# Patient Record
Sex: MALE | Race: WHITE | Employment: OTHER | URBAN - METROPOLITAN AREA
[De-identification: names, ages, dates, MRNs, and addresses within clinical notes are randomized per-mention and may not be internally consistent; named-entity substitution may affect disease eponyms.]

---

## 2017-03-20 ENCOUNTER — OFFICE VISIT (OUTPATIENT)
Dept: FAMILY MEDICINE CLINIC | Age: 72
End: 2017-03-20
Payer: MEDICARE

## 2017-03-20 ENCOUNTER — TELEPHONE (OUTPATIENT)
Dept: FAMILY MEDICINE CLINIC | Age: 72
End: 2017-03-20

## 2017-03-20 VITALS
DIASTOLIC BLOOD PRESSURE: 72 MMHG | OXYGEN SATURATION: 98 % | BODY MASS INDEX: 27.26 KG/M2 | SYSTOLIC BLOOD PRESSURE: 130 MMHG | HEART RATE: 58 BPM | WEIGHT: 190 LBS

## 2017-03-20 DIAGNOSIS — I10 ESSENTIAL HYPERTENSION, BENIGN: ICD-10-CM

## 2017-03-20 DIAGNOSIS — Z79.4 TYPE 2 DIABETES MELLITUS WITH DIABETIC NEUROPATHY, WITH LONG-TERM CURRENT USE OF INSULIN (HCC): Primary | ICD-10-CM

## 2017-03-20 DIAGNOSIS — E11.40 TYPE 2 DIABETES MELLITUS WITH DIABETIC NEUROPATHY, WITH LONG-TERM CURRENT USE OF INSULIN (HCC): Primary | ICD-10-CM

## 2017-03-20 DIAGNOSIS — E78.00 PURE HYPERCHOLESTEROLEMIA: ICD-10-CM

## 2017-03-20 LAB
CREATININE URINE POCT: ABNORMAL
HBA1C MFR BLD: 8.2 %
MICROALBUMIN/CREAT 24H UR: ABNORMAL MG/G{CREAT}
MICROALBUMIN/CREAT UR-RTO: ABNORMAL

## 2017-03-20 PROCEDURE — 82044 UR ALBUMIN SEMIQUANTITATIVE: CPT | Performed by: FAMILY MEDICINE

## 2017-03-20 PROCEDURE — 3045F PR MOST RECENT HEMOGLOBIN A1C LEVEL 7.0-9.0%: CPT | Performed by: FAMILY MEDICINE

## 2017-03-20 PROCEDURE — 1036F TOBACCO NON-USER: CPT | Performed by: FAMILY MEDICINE

## 2017-03-20 PROCEDURE — 99214 OFFICE O/P EST MOD 30 MIN: CPT | Performed by: FAMILY MEDICINE

## 2017-03-20 PROCEDURE — G8484 FLU IMMUNIZE NO ADMIN: HCPCS | Performed by: FAMILY MEDICINE

## 2017-03-20 PROCEDURE — G8420 CALC BMI NORM PARAMETERS: HCPCS | Performed by: FAMILY MEDICINE

## 2017-03-20 PROCEDURE — G8427 DOCREV CUR MEDS BY ELIG CLIN: HCPCS | Performed by: FAMILY MEDICINE

## 2017-03-20 PROCEDURE — 3017F COLORECTAL CA SCREEN DOC REV: CPT | Performed by: FAMILY MEDICINE

## 2017-03-20 PROCEDURE — 83036 HEMOGLOBIN GLYCOSYLATED A1C: CPT | Performed by: FAMILY MEDICINE

## 2017-03-20 PROCEDURE — 4040F PNEUMOC VAC/ADMIN/RCVD: CPT | Performed by: FAMILY MEDICINE

## 2017-03-20 PROCEDURE — 1123F ACP DISCUSS/DSCN MKR DOCD: CPT | Performed by: FAMILY MEDICINE

## 2017-03-20 RX ORDER — METOPROLOL SUCCINATE 50 MG/1
50 TABLET, EXTENDED RELEASE ORAL DAILY
Qty: 90 TABLET | Refills: 1 | Status: SHIPPED | OUTPATIENT
Start: 2017-03-20 | End: 2017-07-17 | Stop reason: SDUPTHER

## 2017-03-20 RX ORDER — FLUTICASONE PROPIONATE 50 MCG
1 SPRAY, SUSPENSION (ML) NASAL 2 TIMES DAILY
COMMUNITY
End: 2018-07-16 | Stop reason: SINTOL

## 2017-03-20 RX ORDER — ATORVASTATIN CALCIUM 10 MG/1
10 TABLET, FILM COATED ORAL DAILY
Qty: 90 TABLET | Refills: 1 | Status: SHIPPED | OUTPATIENT
Start: 2017-03-20 | End: 2017-09-20 | Stop reason: SDUPTHER

## 2017-03-20 RX ORDER — MONTELUKAST SODIUM 10 MG/1
10 TABLET ORAL NIGHTLY
Qty: 30 TABLET | Refills: 2 | Status: CANCELLED | OUTPATIENT
Start: 2017-03-20

## 2017-03-20 RX ORDER — LISINOPRIL 5 MG/1
5 TABLET ORAL DAILY
Qty: 90 TABLET | Refills: 1 | Status: SHIPPED | OUTPATIENT
Start: 2017-03-20 | End: 2017-09-20 | Stop reason: SDUPTHER

## 2017-03-20 RX ORDER — LANCETS 30 GAUGE
EACH MISCELLANEOUS
Qty: 100 EACH | Refills: 3 | Status: CANCELLED | OUTPATIENT
Start: 2017-03-20

## 2017-03-20 RX ORDER — GLUCOSAMINE HCL/CHONDROITIN SU 500-400 MG
CAPSULE ORAL
Qty: 100 STRIP | Refills: 3 | Status: SHIPPED | OUTPATIENT
Start: 2017-03-20 | End: 2017-08-28 | Stop reason: SDUPTHER

## 2017-03-20 ASSESSMENT — ENCOUNTER SYMPTOMS
ABDOMINAL PAIN: 0
BLURRED VISION: 0
COUGH: 0
SHORTNESS OF BREATH: 0
EYE REDNESS: 0
FACIAL SWELLING: 0
EYE DISCHARGE: 0
TROUBLE SWALLOWING: 0
VOMITING: 0
VISUAL CHANGE: 0
DIARRHEA: 0
CONSTIPATION: 0
BLOOD IN STOOL: 0
NAUSEA: 0

## 2017-04-19 ENCOUNTER — OFFICE VISIT (OUTPATIENT)
Dept: FAMILY MEDICINE CLINIC | Age: 72
End: 2017-04-19
Payer: MEDICARE

## 2017-04-19 VITALS
SYSTOLIC BLOOD PRESSURE: 130 MMHG | TEMPERATURE: 97.6 F | HEART RATE: 68 BPM | BODY MASS INDEX: 26.18 KG/M2 | WEIGHT: 187 LBS | DIASTOLIC BLOOD PRESSURE: 70 MMHG | HEIGHT: 71 IN | RESPIRATION RATE: 18 BRPM

## 2017-04-19 DIAGNOSIS — J01.01 ACUTE RECURRENT MAXILLARY SINUSITIS: Primary | ICD-10-CM

## 2017-04-19 PROCEDURE — 1123F ACP DISCUSS/DSCN MKR DOCD: CPT | Performed by: NURSE PRACTITIONER

## 2017-04-19 PROCEDURE — G8427 DOCREV CUR MEDS BY ELIG CLIN: HCPCS | Performed by: NURSE PRACTITIONER

## 2017-04-19 PROCEDURE — 4040F PNEUMOC VAC/ADMIN/RCVD: CPT | Performed by: NURSE PRACTITIONER

## 2017-04-19 PROCEDURE — 3017F COLORECTAL CA SCREEN DOC REV: CPT | Performed by: NURSE PRACTITIONER

## 2017-04-19 PROCEDURE — G8420 CALC BMI NORM PARAMETERS: HCPCS | Performed by: NURSE PRACTITIONER

## 2017-04-19 PROCEDURE — 99213 OFFICE O/P EST LOW 20 MIN: CPT | Performed by: NURSE PRACTITIONER

## 2017-04-19 PROCEDURE — 1036F TOBACCO NON-USER: CPT | Performed by: NURSE PRACTITIONER

## 2017-04-19 RX ORDER — AMOXICILLIN AND CLAVULANATE POTASSIUM 875; 125 MG/1; MG/1
1 TABLET, FILM COATED ORAL EVERY 12 HOURS
Qty: 14 TABLET | Refills: 0 | Status: SHIPPED | OUTPATIENT
Start: 2017-04-19 | End: 2017-04-26

## 2017-04-19 ASSESSMENT — ENCOUNTER SYMPTOMS
WHEEZING: 0
RHINORRHEA: 1
VOICE CHANGE: 1
SINUS PRESSURE: 1
SORE THROAT: 1
ABDOMINAL DISTENTION: 0
SHORTNESS OF BREATH: 0
COUGH: 1

## 2017-07-17 DIAGNOSIS — I10 ESSENTIAL HYPERTENSION, BENIGN: ICD-10-CM

## 2017-07-17 RX ORDER — METOPROLOL SUCCINATE 50 MG/1
50 TABLET, EXTENDED RELEASE ORAL DAILY
Qty: 90 TABLET | Refills: 1 | Status: SHIPPED | OUTPATIENT
Start: 2017-07-17 | End: 2017-09-20 | Stop reason: SDUPTHER

## 2017-07-20 DIAGNOSIS — Z79.4 TYPE 2 DIABETES MELLITUS WITH DIABETIC NEUROPATHY, WITH LONG-TERM CURRENT USE OF INSULIN (HCC): ICD-10-CM

## 2017-07-20 DIAGNOSIS — E11.40 TYPE 2 DIABETES MELLITUS WITH DIABETIC NEUROPATHY, WITH LONG-TERM CURRENT USE OF INSULIN (HCC): ICD-10-CM

## 2017-08-03 LAB
AVERAGE GLUCOSE: NORMAL
CHOLESTEROL, TOTAL: 123 MG/DL
CHOLESTEROL/HDL RATIO: 3.7
HBA1C MFR BLD: 8.4 %
HDLC SERPL-MCNC: 33 MG/DL (ref 35–70)
LDL CHOLESTEROL CALCULATED: 72 MG/DL (ref 0–160)
TRIGL SERPL-MCNC: 89 MG/DL
VLDLC SERPL CALC-MCNC: ABNORMAL MG/DL

## 2017-08-24 ENCOUNTER — HOSPITAL ENCOUNTER (OUTPATIENT)
Dept: DIABETES SERVICES | Age: 72
Discharge: HOME | End: 2017-08-24
Payer: MEDICARE

## 2017-08-24 VITALS
SYSTOLIC BLOOD PRESSURE: 138 MMHG | BODY MASS INDEX: 26.1 KG/M2 | WEIGHT: 186.4 LBS | DIASTOLIC BLOOD PRESSURE: 71 MMHG | HEIGHT: 71 IN

## 2017-08-24 PROCEDURE — G0108 DIAB MANAGE TRN  PER INDIV: HCPCS

## 2017-08-24 RX ORDER — EPINEPHRINE 0.3 MG/.3ML
0.3 INJECTION SUBCUTANEOUS PRN
COMMUNITY
End: 2017-12-22 | Stop reason: SDUPTHER

## 2017-08-24 RX ORDER — BACLOFEN 20 MG
1 TABLET ORAL DAILY
COMMUNITY
End: 2022-08-02

## 2017-08-24 RX ORDER — ASPIRIN 81 MG/1
81 TABLET ORAL DAILY
COMMUNITY

## 2017-08-25 ENCOUNTER — TELEPHONE (OUTPATIENT)
Dept: FAMILY MEDICINE CLINIC | Age: 72
End: 2017-08-25

## 2017-08-28 ENCOUNTER — TELEPHONE (OUTPATIENT)
Dept: DIABETES SERVICES | Age: 72
End: 2017-08-28

## 2017-08-29 RX ORDER — GLUCOSAMINE HCL/CHONDROITIN SU 500-400 MG
CAPSULE ORAL
Qty: 100 STRIP | Refills: 3 | Status: SHIPPED | OUTPATIENT
Start: 2017-08-29 | End: 2017-12-04 | Stop reason: SDUPTHER

## 2017-09-18 ENCOUNTER — HOSPITAL ENCOUNTER (OUTPATIENT)
Dept: NUTRITION | Age: 72
Discharge: HOME | End: 2017-09-18
Payer: MEDICARE

## 2017-09-18 PROCEDURE — 97802 MEDICAL NUTRITION INDIV IN: CPT

## 2017-09-20 ENCOUNTER — OFFICE VISIT (OUTPATIENT)
Dept: FAMILY MEDICINE CLINIC | Age: 72
End: 2017-09-20
Payer: MEDICARE

## 2017-09-20 VITALS — DIASTOLIC BLOOD PRESSURE: 54 MMHG | WEIGHT: 183 LBS | SYSTOLIC BLOOD PRESSURE: 110 MMHG | BODY MASS INDEX: 25.89 KG/M2

## 2017-09-20 DIAGNOSIS — I10 ESSENTIAL HYPERTENSION, BENIGN: ICD-10-CM

## 2017-09-20 DIAGNOSIS — Z79.4 TYPE 2 DIABETES MELLITUS WITHOUT COMPLICATION, WITH LONG-TERM CURRENT USE OF INSULIN (HCC): ICD-10-CM

## 2017-09-20 DIAGNOSIS — E78.00 PURE HYPERCHOLESTEROLEMIA: ICD-10-CM

## 2017-09-20 DIAGNOSIS — E11.9 TYPE 2 DIABETES MELLITUS WITHOUT COMPLICATION, WITH LONG-TERM CURRENT USE OF INSULIN (HCC): ICD-10-CM

## 2017-09-20 PROCEDURE — 1123F ACP DISCUSS/DSCN MKR DOCD: CPT | Performed by: FAMILY MEDICINE

## 2017-09-20 PROCEDURE — G8427 DOCREV CUR MEDS BY ELIG CLIN: HCPCS | Performed by: FAMILY MEDICINE

## 2017-09-20 PROCEDURE — 99214 OFFICE O/P EST MOD 30 MIN: CPT | Performed by: FAMILY MEDICINE

## 2017-09-20 PROCEDURE — 4040F PNEUMOC VAC/ADMIN/RCVD: CPT | Performed by: FAMILY MEDICINE

## 2017-09-20 PROCEDURE — G8417 CALC BMI ABV UP PARAM F/U: HCPCS | Performed by: FAMILY MEDICINE

## 2017-09-20 PROCEDURE — 3046F HEMOGLOBIN A1C LEVEL >9.0%: CPT | Performed by: FAMILY MEDICINE

## 2017-09-20 PROCEDURE — 1036F TOBACCO NON-USER: CPT | Performed by: FAMILY MEDICINE

## 2017-09-20 PROCEDURE — 3017F COLORECTAL CA SCREEN DOC REV: CPT | Performed by: FAMILY MEDICINE

## 2017-09-20 RX ORDER — METOPROLOL SUCCINATE 50 MG/1
50 TABLET, EXTENDED RELEASE ORAL DAILY
Qty: 90 TABLET | Refills: 1 | Status: SHIPPED | OUTPATIENT
Start: 2017-09-20 | End: 2018-01-12 | Stop reason: DRUGHIGH

## 2017-09-20 RX ORDER — ATORVASTATIN CALCIUM 10 MG/1
10 TABLET, FILM COATED ORAL DAILY
Qty: 90 TABLET | Refills: 1 | Status: SHIPPED | OUTPATIENT
Start: 2017-09-20 | End: 2018-03-05 | Stop reason: SDUPTHER

## 2017-09-20 RX ORDER — LISINOPRIL 5 MG/1
5 TABLET ORAL DAILY
Qty: 90 TABLET | Refills: 1 | Status: SHIPPED | OUTPATIENT
Start: 2017-09-20 | End: 2018-03-05 | Stop reason: SDUPTHER

## 2017-09-20 RX ORDER — LANCETS 30 GAUGE
EACH MISCELLANEOUS
Qty: 100 EACH | Refills: 3 | Status: SHIPPED | OUTPATIENT
Start: 2017-09-20

## 2017-09-20 ASSESSMENT — ENCOUNTER SYMPTOMS
EYE DISCHARGE: 0
DIARRHEA: 0
COUGH: 0
FACIAL SWELLING: 0
BLOOD IN STOOL: 0
VOMITING: 0
EYE REDNESS: 0
SHORTNESS OF BREATH: 0
ABDOMINAL PAIN: 0
CONSTIPATION: 0
TROUBLE SWALLOWING: 0
NAUSEA: 1

## 2017-11-16 LAB
AVERAGE GLUCOSE: NORMAL
HBA1C MFR BLD: 7.8 %

## 2017-12-04 ENCOUNTER — OFFICE VISIT (OUTPATIENT)
Dept: FAMILY MEDICINE CLINIC | Age: 72
End: 2017-12-04
Payer: MEDICARE

## 2017-12-04 VITALS — DIASTOLIC BLOOD PRESSURE: 62 MMHG | BODY MASS INDEX: 26.88 KG/M2 | SYSTOLIC BLOOD PRESSURE: 128 MMHG | WEIGHT: 190 LBS

## 2017-12-04 DIAGNOSIS — R07.89 OTHER CHEST PAIN: ICD-10-CM

## 2017-12-04 DIAGNOSIS — M25.512 CHRONIC PAIN OF BOTH SHOULDERS: ICD-10-CM

## 2017-12-04 DIAGNOSIS — G89.29 CHRONIC PAIN OF BOTH SHOULDERS: ICD-10-CM

## 2017-12-04 DIAGNOSIS — M25.511 CHRONIC PAIN OF BOTH SHOULDERS: ICD-10-CM

## 2017-12-04 DIAGNOSIS — Z23 NEED FOR INFLUENZA VACCINATION: ICD-10-CM

## 2017-12-04 DIAGNOSIS — Z13.6 SCREENING FOR AAA (AORTIC ABDOMINAL ANEURYSM): ICD-10-CM

## 2017-12-04 DIAGNOSIS — E11.40 TYPE 2 DIABETES MELLITUS WITH DIABETIC NEUROPATHY, WITH LONG-TERM CURRENT USE OF INSULIN (HCC): Primary | ICD-10-CM

## 2017-12-04 DIAGNOSIS — Z79.4 TYPE 2 DIABETES MELLITUS WITH DIABETIC NEUROPATHY, WITH LONG-TERM CURRENT USE OF INSULIN (HCC): Primary | ICD-10-CM

## 2017-12-04 PROCEDURE — 3017F COLORECTAL CA SCREEN DOC REV: CPT | Performed by: FAMILY MEDICINE

## 2017-12-04 PROCEDURE — 3045F PR MOST RECENT HEMOGLOBIN A1C LEVEL 7.0-9.0%: CPT | Performed by: FAMILY MEDICINE

## 2017-12-04 PROCEDURE — 99214 OFFICE O/P EST MOD 30 MIN: CPT | Performed by: FAMILY MEDICINE

## 2017-12-04 PROCEDURE — G8427 DOCREV CUR MEDS BY ELIG CLIN: HCPCS | Performed by: FAMILY MEDICINE

## 2017-12-04 PROCEDURE — 1123F ACP DISCUSS/DSCN MKR DOCD: CPT | Performed by: FAMILY MEDICINE

## 2017-12-04 PROCEDURE — G0008 ADMIN INFLUENZA VIRUS VAC: HCPCS | Performed by: FAMILY MEDICINE

## 2017-12-04 PROCEDURE — G8484 FLU IMMUNIZE NO ADMIN: HCPCS | Performed by: FAMILY MEDICINE

## 2017-12-04 PROCEDURE — G8417 CALC BMI ABV UP PARAM F/U: HCPCS | Performed by: FAMILY MEDICINE

## 2017-12-04 PROCEDURE — 1036F TOBACCO NON-USER: CPT | Performed by: FAMILY MEDICINE

## 2017-12-04 PROCEDURE — 4040F PNEUMOC VAC/ADMIN/RCVD: CPT | Performed by: FAMILY MEDICINE

## 2017-12-04 PROCEDURE — 90686 IIV4 VACC NO PRSV 0.5 ML IM: CPT | Performed by: FAMILY MEDICINE

## 2017-12-04 RX ORDER — GLUCOSAMINE HCL/CHONDROITIN SU 500-400 MG
CAPSULE ORAL
Qty: 100 STRIP | Refills: 3 | Status: SHIPPED | OUTPATIENT
Start: 2017-12-04

## 2017-12-04 RX ORDER — EPINEPHRINE 0.3 MG/.3ML
0.3 INJECTION SUBCUTANEOUS
COMMUNITY
Start: 2017-08-05

## 2017-12-04 ASSESSMENT — ENCOUNTER SYMPTOMS
BLOOD IN STOOL: 0
DIARRHEA: 0
VOMITING: 0
EYE REDNESS: 0
SHORTNESS OF BREATH: 0
ABDOMINAL PAIN: 0
TROUBLE SWALLOWING: 0
VISUAL CHANGE: 0
CONSTIPATION: 0
BLURRED VISION: 0
EYE DISCHARGE: 0
COUGH: 0
NAUSEA: 0

## 2017-12-04 ASSESSMENT — PATIENT HEALTH QUESTIONNAIRE - PHQ9
SUM OF ALL RESPONSES TO PHQ QUESTIONS 1-9: 0
1. LITTLE INTEREST OR PLEASURE IN DOING THINGS: 0
SUM OF ALL RESPONSES TO PHQ9 QUESTIONS 1 & 2: 0
2. FEELING DOWN, DEPRESSED OR HOPELESS: 0

## 2017-12-04 NOTE — PROGRESS NOTES
HPI Notes    Name: Marcos Alberto  : 1945         Chief Complaint:     Chief Complaint   Patient presents with    Diabetes     Pt presents today for 3 month follow up, Plan from  [address] D/C Victoza due to side effects continue Glucophage, D/C Tresiba due to cost. Pt will try insulin N  55 U in AM and 40 U  PM  Pt referred to DM clinic     Chest Pain       History of Present Illness:      Marcos Alberto is a 81 y.o.  male who presents with Diabetes (Pt presents today for 3 month follow up, Plan from  [address] D/C Victoza due to side effects continue Glucophage, D/C Tresiba due to cost. Pt will try insulin N  55 U in AM and 40 U  PM  Pt referred to DM clinic ) and Chest Pain      Diabetes   He presents for his follow-up diabetic visit. He has type 2 diabetes mellitus. His disease course has been improving. There are no hypoglycemic associated symptoms. Pertinent negatives for hypoglycemia include no dizziness, headaches or tremors. Associated symptoms include chest pain. Pertinent negatives for diabetes include no blurred vision, no fatigue, no foot paresthesias, no foot ulcerations, no polydipsia, no polyuria and no visual change. There are no hypoglycemic complications. Symptoms are stable. There are no diabetic complications. Risk factors for coronary artery disease include diabetes mellitus and male sex. Current diabetic treatment includes insulin injections. He is compliant with treatment most of the time. His weight is stable. He is following a diabetic diet. His home blood glucose trend is decreasing steadily. An ACE inhibitor/angiotensin II receptor blocker is being taken. He sees a podiatrist (Dr Norah Garay). Eye exam is current. Chest Pain    This is a new problem. The current episode started 1 to 4 weeks ago. The onset quality is gradual. The problem occurs intermittently. The problem has been unchanged. The pain is present in the substernal region.  The pain radiates to the left shoulder and REMOVAL      LUNG BIOPSY  2007    PROSTATE BIOPSY      TOOTH EXTRACTION  4/2014    VASECTOMY          Medications:       Prior to Admission medications    Medication Sig Start Date End Date Taking? Authorizing Provider   EPINEPHrine (EPIPEN) 0.3 MG/0.3ML SOAJ injection Inject 0.3 mg into the muscle 8/5/17  Yes Historical Provider, MD   metoprolol succinate (TOPROL XL) 50 MG extended release tablet Take 1 tablet by mouth daily 9/20/17  Yes Ryan Rodriguez MD   atorvastatin (LIPITOR) 10 MG tablet Take 1 tablet by mouth daily 9/20/17  Yes Ryan Rodriguez MD   lisinopril (PRINIVIL;ZESTRIL) 5 MG tablet Take 1 tablet by mouth daily 9/20/17  Yes Ryan Rodriguez MD   insulin NPH (HUMULIN N) 100 UNIT/ML injection vial Inject  SC 30 units in AM and 30 units in PM  Patient taking differently: Inject  SC 55 units in AM and 40 units in PM 9/20/17  Yes Ryan Rodriguez MD   Calcium Carb-Cholecalciferol 600-500 MG-UNIT CAPS Take 1 capsule by mouth 2 times daily   Yes Historical Provider, MD   Cholecalciferol (VITAMIN D3) 5000 units CAPS Take 1 capsule by mouth daily   Yes Historical Provider, MD   Magnesium Oxide 500 MG TABS Take 1 tablet by mouth daily   Yes Historical Provider, MD   aspirin 81 MG EC tablet Take 81 mg by mouth daily   Yes Historical Provider, MD   mupirocin (BACTROBAN) 2 % ointment by Each Nare route nightly   Yes Juana Dominguez   metFORMIN (GLUCOPHAGE) 1000 MG tablet Take 1 tablet by mouth 2 times daily (with meals) 7/20/17  Yes Ryan Rodriguez MD   fluticasone (FLONASE) 50 MCG/ACT nasal spray 1 spray by Nasal route 2 times daily    Yes Historical Provider, MD   NONFORMULARY Indications: Neuravite takes 1 daily B1, B6, B12, alpha lipoic acid in each capsule. Yes Historical Provider, MD   leuprolide (LUPRON) 22.5 MG injection Inject 22.5 mg into the muscle.  Every three months   Yes Historical Provider, MD   Naproxen Sodium (ALEVE) 220 MG CAPS Take 2 tablets by mouth every evening    Yes Historical urinating, dysuria and hematuria. Musculoskeletal: Positive for arthralgias and stiffness. Negative for joint swelling and neck pain. Bilateral shoulder pain. Skin: Negative for rash. Neurological: Negative for dizziness, tingling, tremors, light-headedness, numbness and headaches. Physical Exam:     Physical Exam   Constitutional: He appears well-developed and well-nourished. HENT:   Head: Normocephalic and atraumatic. Eyes: Conjunctivae are normal. Pupils are equal, round, and reactive to light. Right eye exhibits no discharge. Left eye exhibits no discharge. Neck: Neck supple. No thyromegaly present. Cardiovascular: Normal rate, regular rhythm and normal heart sounds. No murmur heard. Pulmonary/Chest: Effort normal and breath sounds normal. No respiratory distress. He has no wheezes. Abdominal: Soft. Bowel sounds are normal. He exhibits no distension. There is no tenderness. Musculoskeletal: Normal range of motion. He exhibits tenderness. He exhibits no edema or deformity. Right shoulder: He exhibits tenderness. He exhibits normal range of motion, no bony tenderness, no swelling, no effusion, no deformity and normal strength. Left shoulder: He exhibits tenderness. He exhibits normal range of motion, no bony tenderness, no swelling, no crepitus, no deformity, no pain and normal strength. Tenderness to palpate over the [facility] joint   Lymphadenopathy:     He has no cervical adenopathy. Skin: Skin is warm and dry. No rash noted. No erythema. Psychiatric: He has a normal mood and affect. His behavior is normal.   Vitals reviewed.       Vitals:  /62   Wt 190 lb (86.2 kg)   BMI 26.88 kg/m²       Data:     No results found for: NA, K, CL, CO2, BUN, CREATININE, GLUCOSE, PROT, LABALBU, BILITOT, ALKPHOS, AST, ALT  No results found for: WBC, RBC, HGB, HCT, MCV, MCH, MCHC, RDW, PLT, MPV  No results found for: TSH  Lab Results   Component Value Date    CHOL 123

## 2017-12-06 ENCOUNTER — TELEPHONE (OUTPATIENT)
Dept: FAMILY MEDICINE CLINIC | Age: 72
End: 2017-12-06

## 2017-12-06 NOTE — TELEPHONE ENCOUNTER
Per Dr Hawley Batch, it's ok to have PET scan and stress test done on dates they are ordered.
hypertension, benign     Incontinence of urine     Prostate cancer ([facility] [address]     Hyperlipidemia     History of colonic polyps     Osteoporosis     Type 2 diabetes mellitus with diabetic neuropathy, with long-term current use of insulin (HCC)     Seasonal allergies

## 2017-12-13 ENCOUNTER — TELEPHONE (OUTPATIENT)
Dept: CARDIOLOGY CLINIC | Age: 72
End: 2017-12-13

## 2017-12-13 DIAGNOSIS — I10 ESSENTIAL HYPERTENSION, BENIGN: Primary | ICD-10-CM

## 2017-12-13 DIAGNOSIS — E11.40 TYPE 2 DIABETES MELLITUS WITH DIABETIC NEUROPATHY, WITH LONG-TERM CURRENT USE OF INSULIN (HCC): ICD-10-CM

## 2017-12-13 DIAGNOSIS — Z79.4 TYPE 2 DIABETES MELLITUS WITH DIABETIC NEUROPATHY, WITH LONG-TERM CURRENT USE OF INSULIN (HCC): ICD-10-CM

## 2017-12-13 DIAGNOSIS — E78.00 PURE HYPERCHOLESTEROLEMIA: ICD-10-CM

## 2017-12-13 DIAGNOSIS — R07.89 OTHER CHEST PAIN: ICD-10-CM

## 2017-12-13 DIAGNOSIS — E55.9 VITAMIN D DEFICIENCY: ICD-10-CM

## 2017-12-14 ENCOUNTER — HOSPITAL ENCOUNTER (OUTPATIENT)
Dept: NUCLEAR MEDICINE | Age: 72
Discharge: HOME | End: 2017-12-14
Payer: MEDICARE

## 2017-12-14 ENCOUNTER — HOSPITAL ENCOUNTER (OUTPATIENT)
Dept: ULTRASOUND IMAGING | Age: 72
Discharge: HOME | End: 2017-12-14
Payer: MEDICARE

## 2017-12-14 ENCOUNTER — HOSPITAL ENCOUNTER (OUTPATIENT)
Dept: NON INVASIVE DIAGNOSTICS | Age: 72
Discharge: HOME | End: 2017-12-14
Payer: MEDICARE

## 2017-12-14 ENCOUNTER — HOSPITAL ENCOUNTER (OUTPATIENT)
Age: 72
Discharge: HOME | End: 2017-12-14
Payer: MEDICARE

## 2017-12-14 DIAGNOSIS — Z13.6 SCREENING FOR AAA (AORTIC ABDOMINAL ANEURYSM): ICD-10-CM

## 2017-12-14 DIAGNOSIS — R07.89 OTHER CHEST PAIN: ICD-10-CM

## 2017-12-14 LAB
CHOLESTEROL/HDL RATIO: 4.6
CHOLESTEROL: 157 MG/DL
ESTIMATED AVERAGE GLUCOSE: 177 MG/DL
HBA1C MFR BLD: 7.8 % (ref 4.8–5.9)
HDLC SERPL-MCNC: 34 MG/DL
LDL CHOLESTEROL: 85 MG/DL (ref 0–130)
MAGNESIUM: 2.1 MG/DL (ref 1.6–2.6)
PATIENT FASTING?: YES
TRIGL SERPL-MCNC: 189 MG/DL
TSH SERPL DL<=0.05 MIU/L-ACNC: 3.14 MIU/L (ref 0.3–5)
VITAMIN D 25-HYDROXY: 69 NG/ML (ref 30–100)
VLDLC SERPL CALC-MCNC: ABNORMAL MG/DL (ref 1–30)

## 2017-12-14 PROCEDURE — 3430000000 HC RX DIAGNOSTIC RADIOPHARMACEUTICAL: Performed by: FAMILY MEDICINE

## 2017-12-14 PROCEDURE — 84443 ASSAY THYROID STIM HORMONE: CPT

## 2017-12-14 PROCEDURE — 80061 LIPID PANEL: CPT

## 2017-12-14 PROCEDURE — 36415 COLL VENOUS BLD VENIPUNCTURE: CPT

## 2017-12-14 PROCEDURE — 93017 CV STRESS TEST TRACING ONLY: CPT

## 2017-12-14 PROCEDURE — 82306 VITAMIN D 25 HYDROXY: CPT

## 2017-12-14 PROCEDURE — A9500 TC99M SESTAMIBI: HCPCS | Performed by: FAMILY MEDICINE

## 2017-12-14 PROCEDURE — 76706 US ABDL AORTA SCREEN AAA: CPT

## 2017-12-14 PROCEDURE — 78452 HT MUSCLE IMAGE SPECT MULT: CPT

## 2017-12-14 PROCEDURE — 83735 ASSAY OF MAGNESIUM: CPT

## 2017-12-14 PROCEDURE — 83036 HEMOGLOBIN GLYCOSYLATED A1C: CPT

## 2017-12-14 RX ADMIN — TETRAKIS(2-METHOXYISOBUTYLISOCYANIDE)COPPER(I) TETRAFLUOROBORATE 11.4 MILLICURIE: 1 INJECTION, POWDER, LYOPHILIZED, FOR SOLUTION INTRAVENOUS at 07:25

## 2017-12-14 RX ADMIN — TETRAKIS(2-METHOXYISOBUTYLISOCYANIDE)COPPER(I) TETRAFLUOROBORATE 30.9 MILLICURIE: 1 INJECTION, POWDER, LYOPHILIZED, FOR SOLUTION INTRAVENOUS at 09:53

## 2017-12-18 ENCOUNTER — HOSPITAL ENCOUNTER (OUTPATIENT)
Dept: GENERAL RADIOLOGY | Age: 72
Discharge: HOME | End: 2017-12-18
Payer: MEDICARE

## 2017-12-18 ENCOUNTER — HOSPITAL ENCOUNTER (OUTPATIENT)
Age: 72
Discharge: HOME | End: 2017-12-18
Payer: MEDICARE

## 2017-12-18 DIAGNOSIS — E11.40 TYPE 2 DIABETES MELLITUS WITH DIABETIC NEUROPATHY, WITH LONG-TERM CURRENT USE OF INSULIN (HCC): ICD-10-CM

## 2017-12-18 DIAGNOSIS — I10 ESSENTIAL HYPERTENSION, BENIGN: ICD-10-CM

## 2017-12-18 DIAGNOSIS — Z79.4 TYPE 2 DIABETES MELLITUS WITH DIABETIC NEUROPATHY, WITH LONG-TERM CURRENT USE OF INSULIN (HCC): ICD-10-CM

## 2017-12-18 DIAGNOSIS — R07.89 OTHER CHEST PAIN: ICD-10-CM

## 2017-12-18 PROCEDURE — 71020 XR CHEST STANDARD TWO VW: CPT

## 2017-12-18 NOTE — PROCEDURES
[facility]                              [address] [address]                                CARDIAC STRESS TEST    PATIENT NAME: Millie Smith                       :        1945  MED REC NO:   [id number]                              ROOM:  ACCOUNT NO:   [de-identified]                           ADMIT DATE: 2017  PROVIDER:     Nurys Harrington    EXERCISE MYOVIEW STRESS TEST    DATE OF STUDY:  2017    INDICATION:  Chest pain. IMPRESSION:  1. He exercised 5 minutes on accelerated Sid protocol achieving 8.5  METs. Peak heart rate was 142, which is 94% maximum predicted heart rate. 2.  He had chest pain with his stress test, which subsided in recovery. 3.  He had mild ST depression in I, aVL, V5 and V6, although there is much  artifact and it was difficult to determine the amount of ST depression. 4.  There is overall abnormal cardiac stress test with reproduction of his  chest pain consistent with angina. 5.  Myoview to follow.         Pilar Ronquillo    D: 12/15/2017 14:53:05       T: 12/15/2017 16:00:14     [email or web address]  Job#: [id number]     Doc#: [id number]    CC:  Edil Vizcaino

## 2017-12-22 ENCOUNTER — OFFICE VISIT (OUTPATIENT)
Dept: CARDIOLOGY CLINIC | Age: 72
End: 2017-12-22
Payer: MEDICARE

## 2017-12-22 VITALS
OXYGEN SATURATION: 97 % | SYSTOLIC BLOOD PRESSURE: 140 MMHG | HEART RATE: 74 BPM | BODY MASS INDEX: 27.58 KG/M2 | DIASTOLIC BLOOD PRESSURE: 60 MMHG | WEIGHT: 195 LBS

## 2017-12-22 DIAGNOSIS — E11.40 TYPE 2 DIABETES MELLITUS WITH DIABETIC NEUROPATHY, WITH LONG-TERM CURRENT USE OF INSULIN (HCC): ICD-10-CM

## 2017-12-22 DIAGNOSIS — I20.9 ANGINA, CLASS III (HCC): ICD-10-CM

## 2017-12-22 DIAGNOSIS — E78.00 PURE HYPERCHOLESTEROLEMIA: ICD-10-CM

## 2017-12-22 DIAGNOSIS — R94.39 ABNORMAL STRESS ECG: Primary | ICD-10-CM

## 2017-12-22 DIAGNOSIS — Z79.4 TYPE 2 DIABETES MELLITUS WITH DIABETIC NEUROPATHY, WITH LONG-TERM CURRENT USE OF INSULIN (HCC): ICD-10-CM

## 2017-12-22 DIAGNOSIS — R07.9 CHEST PAIN, UNSPECIFIED TYPE: ICD-10-CM

## 2017-12-22 DIAGNOSIS — I10 ESSENTIAL HYPERTENSION, BENIGN: ICD-10-CM

## 2017-12-22 PROCEDURE — 1036F TOBACCO NON-USER: CPT | Performed by: INTERNAL MEDICINE

## 2017-12-22 PROCEDURE — 3045F PR MOST RECENT HEMOGLOBIN A1C LEVEL 7.0-9.0%: CPT | Performed by: INTERNAL MEDICINE

## 2017-12-22 PROCEDURE — 3017F COLORECTAL CA SCREEN DOC REV: CPT | Performed by: INTERNAL MEDICINE

## 2017-12-22 PROCEDURE — 99204 OFFICE O/P NEW MOD 45 MIN: CPT | Performed by: INTERNAL MEDICINE

## 2017-12-22 PROCEDURE — 4040F PNEUMOC VAC/ADMIN/RCVD: CPT | Performed by: INTERNAL MEDICINE

## 2017-12-22 PROCEDURE — G8598 ASA/ANTIPLAT THER USED: HCPCS | Performed by: INTERNAL MEDICINE

## 2017-12-22 PROCEDURE — G8417 CALC BMI ABV UP PARAM F/U: HCPCS | Performed by: INTERNAL MEDICINE

## 2017-12-22 PROCEDURE — G8484 FLU IMMUNIZE NO ADMIN: HCPCS | Performed by: INTERNAL MEDICINE

## 2017-12-22 PROCEDURE — 1123F ACP DISCUSS/DSCN MKR DOCD: CPT | Performed by: INTERNAL MEDICINE

## 2017-12-22 PROCEDURE — G8428 CUR MEDS NOT DOCUMENT: HCPCS | Performed by: INTERNAL MEDICINE

## 2017-12-22 RX ORDER — ISOSORBIDE MONONITRATE 30 MG/1
30 TABLET, EXTENDED RELEASE ORAL DAILY
COMMUNITY
End: 2017-12-22 | Stop reason: SDUPTHER

## 2017-12-22 RX ORDER — ISOSORBIDE MONONITRATE 30 MG/1
30 TABLET, EXTENDED RELEASE ORAL DAILY
Qty: 30 TABLET | Refills: 11 | Status: SHIPPED | OUTPATIENT
Start: 2017-12-22 | End: 2018-01-12 | Stop reason: DRUGHIGH

## 2017-12-22 NOTE — PROGRESS NOTES
Ov Dr. Fermín Pena for new pt   Pt did not bring med list with him  Today-states should be the same  Referred by Dr. Daina Cheung of abd/pelvis  was ordered-3 vessel disease   Dr. Roshan Alexander ordered [facility] [place]   Was having chest pain and stress was ordered  C/o back pain   When walking will have chest pain   And back pain since nov  Will have numbness right leg  No sob  Will have lightheadedness when getting  Up to fast  C/o edema feet  Will have chest pressure every day   C/o when stressed/anxiety will have   The chest pressure  After walking chest will loosen up  When stop chest pain will go away   Walks 5000 steps daily   Hx of prostate and lung ca-stable now   Bedside echo done    Retired-[employer]  Dad  with chf/pacer  Mom  had cabg  One brother healthy  One sister-stent->?   -Sowmya  3 children-all live close by   Former smoker quit 30 yrs ago     Will start IMDUR 30mg 1/2 tablet daily x one week   Then increase to full tablet daily   Will follow up in 2 weeks, if no improvement will  Proceed to schedule heart cath

## 2017-12-22 NOTE — PATIENT INSTRUCTIONS
Will start IMDUR 30mg 1/2 tablet daily x one week   Then increase to full tablet daily   Will follow up in 2 weeks, if no improvement will  Proceed to schedule heart cath

## 2017-12-22 NOTE — PROGRESS NOTES
retired from [address] [place] [address] [employer] in [facility]. He does not drink alcohol. He walks 5000 steps daily. He has curtailed his walking after developing his chest discomfort. REVIEW OF SYSTEMS:  Cardiac as above. Other 10 systems reviewed, including neurologic, psychiatric, pulmonary, cardiac, GI, , renal, and musculoskeletal.  He has had no weight loss or weight gain. No change in bowel habits, no blood in stools. No fevers, sweats or chills. His energy level has decreased in the last 1 to 2 months. He has developed more shortness of breath with activity as has his chest pain with activity as he had previously. He has had no progression of his cancer. He has no bleeding issues. PHYSICAL EXAMINATION:  VITAL SIGNS:  His blood pressure was 140/60, heart rate of 74 and regular, respiratory rate 18, O2 sat 95%, weight 195 pounds. GENERAL:  He is a pleasant 74-year-old gentleman. Denied pain. He was oriented to person, place and time. Answered questions appropriately. SKIN:  No unusual skin changes. HEENT:  The pupils are equally round and reactive to light and accommodation. Extraocular movements were intact. Mucous membranes were dry. NECK:  No JVD. Good carotid pulses. No carotid bruits. No lymphadenopathy or thyromegaly. CARDIOVASCULAR EXAM:  S1 and S2 were normal.  No S3 or S4. Soft systolic blowing type murmur. No diastolic murmur. PMI was normal.  No lifts, thrusts or pericardial friction rub. LUNGS:  Quite clear to auscultation and percussion. ABDOMEN:  Soft and nontender. Good bowel sounds. The aorta was not enlarged. No hepatomegaly, splenomegaly. EXTREMITIES:  Good femoral pulses. Good pedal pulses. No pedal edema. Skin was warm and dry. No calf tenderness. Nail beds pink. Good cap refill. PULSES:  Bilaterally symmetrical radial, brachial and carotid pulses. No carotid bruits. Good femoral and pedal pulses. NEUROLOGIC EXAM:  Within normal limits.   PSYCHIATRIC EXAM:  Within normal limits. LABORATORY DATA:  From 12/14, magnesium 2.1, cholesterol 157, HDL 34, LDL 85, triglycerides 189. Hemoglobin A1c was 7.8. TSH 3.14. Vitamin D 69. His white count was 5.35, hemoglobin 13.5 with a platelet count of 881,667 on 12/08. His calcium 9.7. AST was 23. BUN 14, creatinine 0.89, sodium 136, potassium 4.5. ALT was 20. Vitamin D was 36. EKG showed sinus rhythm with nonspecific ST changes. His stress test showed inferolateral, anterolateral wall apical ischemia with transient ischemic dilatation with EF of 59%, with high risk for significant coronary artery disease. Bedside echocardiogram showed borderline LV function, EF of 50% with normal right-sided chambers. IMPRESSION:  1. Chest pain consistent with angina, described as a heaviness with activity along with loss of energy and shortness of breath with activity, all new in the last 6 weeks, again consistent with class III angina. 2.  Abnormal Cardiolite stress test, at high risk for coronary artery disease with inferoapical lateral ischemia and transient ischemic dilatation. 3.  Hypertension. 4.  Hyperlipidemia. 5.  Insulin-dependent diabetes. 6.  Family history of coronary artery disease. 7.  Prostate carcinoma with prostatectomy in 06/2006. 8.  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 PLAN:  1. Placed him on full medical therapy with now adding Imdur 30 mg daily. 2.  Reevaluate in 2 weeks and if he is still having angina, we would then proceed with a cardiac catheterization. DISCUSSION: Mr. Heather Gaytan has multiple risk factors for CAD. He has developed discomfort that is consistent with angina within the last 4 to 6 weeks. His stress test was abnormal.    He is currently on beta-blockade, I will add Imdur 30 mg daily and reevaluate in 2 weeks.   If he is still having his discomfort in 2 weeks, then I would proceed directly with a cardiac

## 2017-12-22 NOTE — LETTER
Johny Raza M.D. [address] [place] [address] [address], [address]  [phone]          2017          Marion Resendez MD  [address] [place] [address] [address], [facility]    RE:  Radha Prince. Jaylen Garcia  : 1945    Dear Dr. Keli King:    CHIEF COMPLAINT:  Chest pain. HISTORY OF PRESENT ILLNESS:  Mr. Jaylen Garcia is a pleasant 72-year-old gentleman who has never had a cardiac problem in the past.  He has never had a myocardial infarction or cardiac catheterization. He does have multiple risk factors of CAD including diabetes, hypertension and a family history of coronary artery disease along with hyperlipidemia. He does have a history of prostate cancer, which was discovered in  where he was found to have prostate carcinoma. He underwent a robotic prostatectomy in 2006 when he had a Rumsey score of 4. In , he was found to have pulmonary nodules and he underwent a left upper lobe lobectomy, which proved positive for metastatic adenocarcinoma consistent with prostate primary. He has then been placed on medications and has had no progression of his disease. He is followed by Dr. Ankush Katz who last saw him on 2017. He is relatively active at home. He walks 5000 steps a day. He was doing well until approximately 4 to 5 weeks ago when he began developing chest heaviness and tightness when he would walk. He also noticed that he was more short of breath with activity. He describes heaviness with activity that is worse when he is in colder weather and subsides when he stops. He had a CT scan that showed calcification of his coronary arteries. He was asked to see me because of his chest pain and his atherosclerotic calcifications of his coronary arteries on a CT scan on 2017.     Because of his chest pain, we did a Cardiolite stress test.  He exercised 5 minutes and developed chest pain with the stress test that subsided in has 3 children, all live close by. He quit smoking 30 years ago. He retired from [employer] in [facility] [place] [facility]. He does not drink alcohol. He walks 5000 steps daily. He has curtailed his walking after developing his chest discomfort. REVIEW OF SYSTEMS:  Cardiac as above. Other 10 systems reviewed, including neurologic, psychiatric, pulmonary, cardiac, GI, , renal, and musculoskeletal.  He has had no weight loss or weight gain. No change in bowel habits, no blood in stools. No fevers, sweats or chills. His energy level has decreased in the last 1 to 2 months. He has developed more shortness of breath with activity as has his chest pain with activity as he had previously. He has had no progression of his cancer. He has no bleeding issues. PHYSICAL EXAMINATION:  VITAL SIGNS:  His blood pressure was 140/60, heart rate of 74 and regular, respiratory rate 18, O2 sat 95%, weight 195 pounds. GENERAL:  He is a pleasant 70-year-old gentleman. Denied pain. He was oriented to person, place and time. Answered questions appropriately. SKIN:  No unusual skin changes. HEENT:  The pupils are equally round and reactive to light and accommodation. Extraocular movements were intact. Mucous membranes were dry. NECK:  No JVD. Good carotid pulses. No carotid bruits. No lymphadenopathy or thyromegaly. CARDIOVASCULAR EXAM:  S1 and S2 were normal.  No S3 or S4. Soft systolic blowing type murmur. No diastolic murmur. PMI was normal.  No lifts, thrusts or pericardial friction rub. LUNGS:  Quite clear to auscultation and percussion. ABDOMEN:  Soft and nontender. Good bowel sounds. The aorta was not enlarged. No hepatomegaly, splenomegaly. EXTREMITIES:  Good femoral pulses. Good pedal pulses. No pedal edema. Skin was warm and dry. No calf tenderness. Nail beds pink. Good cap refill. PULSES:  Bilaterally symmetrical radial, brachial and carotid pulses. No carotid bruits. Good femoral and pedal pulses.

## 2018-01-12 ENCOUNTER — OFFICE VISIT (OUTPATIENT)
Dept: CARDIOLOGY CLINIC | Age: 73
End: 2018-01-12
Payer: MEDICARE

## 2018-01-12 VITALS
OXYGEN SATURATION: 97 % | BODY MASS INDEX: 27.3 KG/M2 | SYSTOLIC BLOOD PRESSURE: 130 MMHG | WEIGHT: 193 LBS | HEART RATE: 74 BPM | DIASTOLIC BLOOD PRESSURE: 60 MMHG

## 2018-01-12 DIAGNOSIS — Z79.4 TYPE 2 DIABETES MELLITUS WITH DIABETIC NEUROPATHY, WITH LONG-TERM CURRENT USE OF INSULIN (HCC): ICD-10-CM

## 2018-01-12 DIAGNOSIS — E78.00 PURE HYPERCHOLESTEROLEMIA: ICD-10-CM

## 2018-01-12 DIAGNOSIS — I20.9 ANGINA, CLASS III (HCC): ICD-10-CM

## 2018-01-12 DIAGNOSIS — R94.39 ABNORMAL STRESS ECG: ICD-10-CM

## 2018-01-12 DIAGNOSIS — I10 ESSENTIAL HYPERTENSION, BENIGN: ICD-10-CM

## 2018-01-12 DIAGNOSIS — E11.40 TYPE 2 DIABETES MELLITUS WITH DIABETIC NEUROPATHY, WITH LONG-TERM CURRENT USE OF INSULIN (HCC): ICD-10-CM

## 2018-01-12 DIAGNOSIS — N28.9 RENAL INSUFFICIENCY: Primary | ICD-10-CM

## 2018-01-12 PROCEDURE — 99213 OFFICE O/P EST LOW 20 MIN: CPT | Performed by: INTERNAL MEDICINE

## 2018-01-12 PROCEDURE — G8598 ASA/ANTIPLAT THER USED: HCPCS | Performed by: INTERNAL MEDICINE

## 2018-01-12 PROCEDURE — G8417 CALC BMI ABV UP PARAM F/U: HCPCS | Performed by: INTERNAL MEDICINE

## 2018-01-12 PROCEDURE — 4040F PNEUMOC VAC/ADMIN/RCVD: CPT | Performed by: INTERNAL MEDICINE

## 2018-01-12 PROCEDURE — 1123F ACP DISCUSS/DSCN MKR DOCD: CPT | Performed by: INTERNAL MEDICINE

## 2018-01-12 PROCEDURE — G8484 FLU IMMUNIZE NO ADMIN: HCPCS | Performed by: INTERNAL MEDICINE

## 2018-01-12 PROCEDURE — 3046F HEMOGLOBIN A1C LEVEL >9.0%: CPT | Performed by: INTERNAL MEDICINE

## 2018-01-12 PROCEDURE — 3017F COLORECTAL CA SCREEN DOC REV: CPT | Performed by: INTERNAL MEDICINE

## 2018-01-12 PROCEDURE — G8427 DOCREV CUR MEDS BY ELIG CLIN: HCPCS | Performed by: INTERNAL MEDICINE

## 2018-01-12 PROCEDURE — 1036F TOBACCO NON-USER: CPT | Performed by: INTERNAL MEDICINE

## 2018-01-12 RX ORDER — ISOSORBIDE MONONITRATE 30 MG/1
30 TABLET, EXTENDED RELEASE ORAL 2 TIMES DAILY
Status: SHIPPED | COMMUNITY
Start: 2018-01-12 | End: 2018-07-16 | Stop reason: ALTCHOICE

## 2018-01-12 RX ORDER — METOPROLOL SUCCINATE 50 MG/1
50 TABLET, EXTENDED RELEASE ORAL 2 TIMES DAILY
Status: SHIPPED | COMMUNITY
Start: 2018-01-12 | End: 2018-03-05 | Stop reason: SDUPTHER

## 2018-01-12 NOTE — PROGRESS NOTES
Ov Dr. Mookie Cantrell for 3 wk follow up  Still having chest pain when walking  No changes since last visit  Last night worse episode  Was taking down xmas decoration   Got home chest tightened up shoulder pain  Down to elbows   Didn't get sweaty   Did get nausea   Different pain than walking   Got chest discomfort when   Helping gdtg get out of ditch   Pain went away when resting     Will proceed with cath on Jan 17th  Will increase IMDUR to twice daily   Will increase METOPROLOL  To twice daily
nontender. Good bowel sounds. EXTREMITIES:  Good femoral pulses. Good pedal pulses. No pedal edema. IMPRESSION:  1. Continued class III angina, on full medical therapy. 2.  Abnormal Cardiolite stress test, at high risk for coronary artery disease. 3.  Hypertension. 4.  Hyperlipidemia. 5.  Insulin-dependent diabetes. 6.  Strong family history of coronary artery disease. PLAN:  1. Increase Imdur to 30 mg twice a day. 2.  Increase Toprol XL 50 mg to twice a day. 3.  Plan on proceeding with a cardiac catheterization this Wednesday. 4.  Limit activity until we do the catheterization. DISCUSSION:  Mr. Rickey Gottlieb continues to have class III angina on full medical therapy. Therefore, we will increase his Imdur to twice a day as well as his Toprol to twice a day dosing. I will plan on doing a cardiac catheterization this Wednesday in [place]. He understands if he would develop worsening discomfort at rest that he would need to go to the emergency room. I am very concerned about his symptoms, and we will define his anatomy this Wednesday. Thank you very much for allowing me the privilege of seeing Mr. Rickey Gottlieb. If you have any questions on my thoughts, please do not hesitate to contact me.      Sincerely,        Tray Pizano    D: 01/12/2018 8:14:40       T: 01/12/2018 8:42:28     [email or web address]  Job#: [id number]     Doc#: [id number]

## 2018-01-12 NOTE — PATIENT INSTRUCTIONS
Will proceed with cath on Jan 17th  Will increase IMDUR to twice daily   Will increase METOPROLOL  To twice daily

## 2018-01-15 ENCOUNTER — HOSPITAL ENCOUNTER (OUTPATIENT)
Age: 73
Discharge: HOME | End: 2018-01-15
Payer: MEDICARE

## 2018-01-15 DIAGNOSIS — E78.00 PURE HYPERCHOLESTEROLEMIA: ICD-10-CM

## 2018-01-15 DIAGNOSIS — Z79.4 TYPE 2 DIABETES MELLITUS WITH DIABETIC NEUROPATHY, WITH LONG-TERM CURRENT USE OF INSULIN (HCC): ICD-10-CM

## 2018-01-15 DIAGNOSIS — I10 ESSENTIAL HYPERTENSION, BENIGN: ICD-10-CM

## 2018-01-15 DIAGNOSIS — N28.9 RENAL INSUFFICIENCY: ICD-10-CM

## 2018-01-15 DIAGNOSIS — R07.89 OTHER CHEST PAIN: ICD-10-CM

## 2018-01-15 DIAGNOSIS — E11.40 TYPE 2 DIABETES MELLITUS WITH DIABETIC NEUROPATHY, WITH LONG-TERM CURRENT USE OF INSULIN (HCC): ICD-10-CM

## 2018-01-15 DIAGNOSIS — I20.9 ANGINA, CLASS III (HCC): ICD-10-CM

## 2018-01-15 DIAGNOSIS — R94.39 ABNORMAL STRESS ECG: ICD-10-CM

## 2018-01-15 LAB
ABSOLUTE EOS #: 0.2 K/UL (ref 0–0.4)
ABSOLUTE IMMATURE GRANULOCYTE: ABNORMAL K/UL (ref 0–0.3)
ABSOLUTE LYMPH #: 1.9 K/UL (ref 1–4.8)
ABSOLUTE MONO #: 0.5 K/UL (ref 0–1)
ALBUMIN SERPL-MCNC: 4 G/DL (ref 3.5–5.2)
ALBUMIN/GLOBULIN RATIO: ABNORMAL (ref 1–2.5)
ALP BLD-CCNC: 58 U/L (ref 40–129)
ALT SERPL-CCNC: 14 U/L (ref 5–41)
ANION GAP SERPL CALCULATED.3IONS-SCNC: 12 MMOL/L (ref 9–17)
AST SERPL-CCNC: 16 U/L
BASOPHILS # BLD: 1 % (ref 0–2)
BASOPHILS ABSOLUTE: 0 K/UL (ref 0–0.2)
BILIRUB SERPL-MCNC: 0.46 MG/DL (ref 0.3–1.2)
BUN BLDV-MCNC: 20 MG/DL (ref 8–23)
BUN/CREAT BLD: 29 (ref 9–20)
CALCIUM SERPL-MCNC: 9.1 MG/DL (ref 8.6–10.4)
CHLORIDE BLD-SCNC: 102 MMOL/L (ref 98–107)
CHOLESTEROL/HDL RATIO: 4.4
CHOLESTEROL: 155 MG/DL
CO2: 27 MMOL/L (ref 20–31)
CREAT SERPL-MCNC: 0.69 MG/DL (ref 0.7–1.2)
DIFFERENTIAL TYPE: YES
EKG ATRIAL RATE: 56 BPM
EKG P AXIS: 56 DEGREES
EKG P-R INTERVAL: 140 MS
EKG Q-T INTERVAL: 442 MS
EKG QRS DURATION: 84 MS
EKG QTC CALCULATION (BAZETT): 426 MS
EKG R AXIS: 24 DEGREES
EKG T AXIS: 73 DEGREES
EKG VENTRICULAR RATE: 56 BPM
EOSINOPHILS RELATIVE PERCENT: 3 % (ref 0–5)
GFR AFRICAN AMERICAN: >60 ML/MIN
GFR NON-AFRICAN AMERICAN: >60 ML/MIN
GFR SERPL CREATININE-BSD FRML MDRD: ABNORMAL ML/MIN/{1.73_M2}
GFR SERPL CREATININE-BSD FRML MDRD: ABNORMAL ML/MIN/{1.73_M2}
GLUCOSE BLD-MCNC: 192 MG/DL (ref 70–99)
HCT VFR BLD CALC: 38.5 % (ref 41–53)
HDLC SERPL-MCNC: 35 MG/DL
HEMOGLOBIN: 13.1 G/DL (ref 13.5–17.5)
IMMATURE GRANULOCYTES: ABNORMAL %
LDL CHOLESTEROL: 78 MG/DL (ref 0–130)
LYMPHOCYTES # BLD: 30 % (ref 13–44)
MCH RBC QN AUTO: 31.5 PG (ref 26–34)
MCHC RBC AUTO-ENTMCNC: 34.1 G/DL (ref 31–37)
MCV RBC AUTO: 92.5 FL (ref 80–100)
MONOCYTES # BLD: 8 % (ref 5–9)
PATIENT FASTING?: YES
PDW BLD-RTO: 13.4 % (ref 12.1–15.2)
PLATELET # BLD: 213 K/UL (ref 140–450)
PLATELET ESTIMATE: ABNORMAL
PMV BLD AUTO: ABNORMAL FL (ref 6–12)
POTASSIUM SERPL-SCNC: 4 MMOL/L (ref 3.7–5.3)
RBC # BLD: 4.16 M/UL (ref 4.5–5.9)
RBC # BLD: ABNORMAL 10*6/UL
SEG NEUTROPHILS: 58 % (ref 39–75)
SEGMENTED NEUTROPHILS ABSOLUTE COUNT: 3.8 K/UL (ref 2.1–6.5)
SODIUM BLD-SCNC: 141 MMOL/L (ref 135–144)
TOTAL PROTEIN: 6.5 G/DL (ref 6.4–8.3)
TRIGL SERPL-MCNC: 208 MG/DL
VLDLC SERPL CALC-MCNC: ABNORMAL MG/DL (ref 1–30)
WBC # BLD: 6.5 K/UL (ref 3.5–11)
WBC # BLD: ABNORMAL 10*3/UL

## 2018-01-15 PROCEDURE — 36415 COLL VENOUS BLD VENIPUNCTURE: CPT

## 2018-01-15 PROCEDURE — 80053 COMPREHEN METABOLIC PANEL: CPT

## 2018-01-15 PROCEDURE — 85025 COMPLETE CBC W/AUTO DIFF WBC: CPT

## 2018-01-15 PROCEDURE — 80061 LIPID PANEL: CPT

## 2018-01-15 PROCEDURE — 93005 ELECTROCARDIOGRAM TRACING: CPT

## 2018-01-29 ENCOUNTER — TELEPHONE (OUTPATIENT)
Dept: DIABETES SERVICES | Age: 73
End: 2018-01-29

## 2018-01-29 NOTE — PROGRESS NOTES
Diabetes Self-Management Program Follow-Up    Name:  Antonio Zimmer   Follow-Up Date:  1/29/2018   Class Dates:  1/5/17; 8/24/17  YOB: 1945   Goal:  Healthy Eating. How often did you meet your goal?     [x]All the time (5)   []Most of the time (4)   []Half the time (3)   []Occasionally (2)    []Never (1)  Modify goal: No      Lab Results   Component Value Date    LABA1C 7.8 (H) 12/14/2017    LABA1C 7.8 11/16/2017    LABA1C 8.4 08/03/2017     Lab Results   Component Value Date    LABMICR 7 06/16/2012    LDLCALC 72 08/03/2017    CREATININE 0.69 (L) 01/15/2018       FBS range:   Date: Since discharge from hospital on 1/22/18      Physician Appointment (date of most recent or next scheduled): Many upcoming appts. 2/1/18 appt with Dr. Bran Cho to remove margy. 3/5/18 Dr. Maximiliano Espinoza and Dr. Miryam Camp. Recent health problems or change in diabetes medications: Cardiac surgery. Been admitted to hospital or ED visit last 4 months? Yes. [address]  Do you know the amount of carbohydrates you eat per meal?   [x]Yes 60 grams/meal  []No   Frequency of self-foot exam:   [x]Daily Wife checks his feet. []Other   Eye exam scheduled? [x]Yes In March  []No  How many times a day do you check your blood sugar? []1   []2   []3   [x]4   []more  Have you been exercising since class? [x]Yes    []No   If yes, what kind? Walking- 6000 steps daily   If yes, how many days/week? []1   []2   []3   []4   [x]more  How often do you miss taking your medications? []All the time (5)   []Most of the time (4)   []Half the time (3)   []Occasionally (2)  [x]Never (1)      Diabetes Self Management Support Plan: To assist and support your continued progress in managing your diabetes following    education    (  )  Gym or exercise program of your choice.            (Suggestions:  [employer], [employer] training, any exercise facility and your local Physical                    Therapy or Cardiac Rehabilitation exercise Program

## 2018-03-05 ENCOUNTER — OFFICE VISIT (OUTPATIENT)
Dept: FAMILY MEDICINE CLINIC | Age: 73
End: 2018-03-05
Payer: MEDICARE

## 2018-03-05 ENCOUNTER — OFFICE VISIT (OUTPATIENT)
Dept: CARDIOLOGY CLINIC | Age: 73
End: 2018-03-05
Payer: MEDICARE

## 2018-03-05 VITALS
OXYGEN SATURATION: 98 % | WEIGHT: 185 LBS | BODY MASS INDEX: 26.93 KG/M2 | HEART RATE: 90 BPM | SYSTOLIC BLOOD PRESSURE: 140 MMHG | DIASTOLIC BLOOD PRESSURE: 60 MMHG

## 2018-03-05 VITALS
SYSTOLIC BLOOD PRESSURE: 110 MMHG | WEIGHT: 188 LBS | HEIGHT: 70 IN | DIASTOLIC BLOOD PRESSURE: 52 MMHG | OXYGEN SATURATION: 98 % | HEART RATE: 86 BPM | BODY MASS INDEX: 26.92 KG/M2

## 2018-03-05 DIAGNOSIS — I25.810 CORONARY ARTERY DISEASE INVOLVING AUTOLOGOUS VEIN CORONARY BYPASS GRAFT WITHOUT ANGINA PECTORIS: ICD-10-CM

## 2018-03-05 DIAGNOSIS — Z79.4 TYPE 2 DIABETES MELLITUS WITH DIABETIC NEUROPATHY, WITH LONG-TERM CURRENT USE OF INSULIN (HCC): ICD-10-CM

## 2018-03-05 DIAGNOSIS — E55.9 VITAMIN D DEFICIENCY DISEASE: ICD-10-CM

## 2018-03-05 DIAGNOSIS — I10 ESSENTIAL HYPERTENSION, BENIGN: ICD-10-CM

## 2018-03-05 DIAGNOSIS — E11.40 TYPE 2 DIABETES MELLITUS WITH DIABETIC NEUROPATHY, WITH LONG-TERM CURRENT USE OF INSULIN (HCC): ICD-10-CM

## 2018-03-05 DIAGNOSIS — E78.00 PURE HYPERCHOLESTEROLEMIA: ICD-10-CM

## 2018-03-05 DIAGNOSIS — Z95.1 HX OF CABG: Primary | ICD-10-CM

## 2018-03-05 DIAGNOSIS — E11.40 TYPE 2 DIABETES MELLITUS WITH DIABETIC NEUROPATHY, WITH LONG-TERM CURRENT USE OF INSULIN (HCC): Primary | ICD-10-CM

## 2018-03-05 DIAGNOSIS — Z79.4 TYPE 2 DIABETES MELLITUS WITH DIABETIC NEUROPATHY, WITH LONG-TERM CURRENT USE OF INSULIN (HCC): Primary | ICD-10-CM

## 2018-03-05 LAB — HBA1C MFR BLD: 6.7 %

## 2018-03-05 PROCEDURE — 1123F ACP DISCUSS/DSCN MKR DOCD: CPT | Performed by: FAMILY MEDICINE

## 2018-03-05 PROCEDURE — G8427 DOCREV CUR MEDS BY ELIG CLIN: HCPCS | Performed by: INTERNAL MEDICINE

## 2018-03-05 PROCEDURE — 1036F TOBACCO NON-USER: CPT | Performed by: INTERNAL MEDICINE

## 2018-03-05 PROCEDURE — 4040F PNEUMOC VAC/ADMIN/RCVD: CPT | Performed by: INTERNAL MEDICINE

## 2018-03-05 PROCEDURE — 3017F COLORECTAL CA SCREEN DOC REV: CPT | Performed by: INTERNAL MEDICINE

## 2018-03-05 PROCEDURE — G8598 ASA/ANTIPLAT THER USED: HCPCS | Performed by: FAMILY MEDICINE

## 2018-03-05 PROCEDURE — G8484 FLU IMMUNIZE NO ADMIN: HCPCS | Performed by: FAMILY MEDICINE

## 2018-03-05 PROCEDURE — G8417 CALC BMI ABV UP PARAM F/U: HCPCS | Performed by: FAMILY MEDICINE

## 2018-03-05 PROCEDURE — 1036F TOBACCO NON-USER: CPT | Performed by: FAMILY MEDICINE

## 2018-03-05 PROCEDURE — 4040F PNEUMOC VAC/ADMIN/RCVD: CPT | Performed by: FAMILY MEDICINE

## 2018-03-05 PROCEDURE — 3044F HG A1C LEVEL LT 7.0%: CPT | Performed by: FAMILY MEDICINE

## 2018-03-05 PROCEDURE — 1123F ACP DISCUSS/DSCN MKR DOCD: CPT | Performed by: INTERNAL MEDICINE

## 2018-03-05 PROCEDURE — 99213 OFFICE O/P EST LOW 20 MIN: CPT | Performed by: INTERNAL MEDICINE

## 2018-03-05 PROCEDURE — G8427 DOCREV CUR MEDS BY ELIG CLIN: HCPCS | Performed by: FAMILY MEDICINE

## 2018-03-05 PROCEDURE — G8417 CALC BMI ABV UP PARAM F/U: HCPCS | Performed by: INTERNAL MEDICINE

## 2018-03-05 PROCEDURE — G8484 FLU IMMUNIZE NO ADMIN: HCPCS | Performed by: INTERNAL MEDICINE

## 2018-03-05 PROCEDURE — 83036 HEMOGLOBIN GLYCOSYLATED A1C: CPT | Performed by: FAMILY MEDICINE

## 2018-03-05 PROCEDURE — 99214 OFFICE O/P EST MOD 30 MIN: CPT | Performed by: FAMILY MEDICINE

## 2018-03-05 PROCEDURE — 3044F HG A1C LEVEL LT 7.0%: CPT | Performed by: INTERNAL MEDICINE

## 2018-03-05 PROCEDURE — G8598 ASA/ANTIPLAT THER USED: HCPCS | Performed by: INTERNAL MEDICINE

## 2018-03-05 PROCEDURE — 3017F COLORECTAL CA SCREEN DOC REV: CPT | Performed by: FAMILY MEDICINE

## 2018-03-05 RX ORDER — LISINOPRIL 5 MG/1
5 TABLET ORAL 2 TIMES DAILY
Qty: 180 TABLET | Refills: 3 | Status: SHIPPED | OUTPATIENT
Start: 2018-03-05 | End: 2018-09-06 | Stop reason: ALTCHOICE

## 2018-03-05 RX ORDER — ATORVASTATIN CALCIUM 40 MG/1
40 TABLET, FILM COATED ORAL DAILY
Qty: 90 TABLET | Refills: 3 | Status: SHIPPED | OUTPATIENT
Start: 2018-03-05 | End: 2019-05-05 | Stop reason: SDUPTHER

## 2018-03-05 RX ORDER — M-VIT,TX,IRON,MINS/CALC/FOLIC 27MG-0.4MG
1 TABLET ORAL DAILY
COMMUNITY
End: 2020-07-13 | Stop reason: ALTCHOICE

## 2018-03-05 RX ORDER — DOCUSATE SODIUM 100 MG/1
100 CAPSULE, LIQUID FILLED ORAL DAILY
COMMUNITY
End: 2020-07-13 | Stop reason: ALTCHOICE

## 2018-03-05 RX ORDER — METOPROLOL SUCCINATE 50 MG/1
50 TABLET, EXTENDED RELEASE ORAL 2 TIMES DAILY
Qty: 180 TABLET | Refills: 3 | Status: SHIPPED | OUTPATIENT
Start: 2018-03-05 | End: 2019-04-16 | Stop reason: SDUPTHER

## 2018-03-05 ASSESSMENT — ENCOUNTER SYMPTOMS
TROUBLE SWALLOWING: 0
EYE REDNESS: 0
ABDOMINAL PAIN: 0
BLOOD IN STOOL: 0
SHORTNESS OF BREATH: 0
FACIAL SWELLING: 0
CONSTIPATION: 0
BLURRED VISION: 0
NAUSEA: 0
VISUAL CHANGE: 0
CHEST TIGHTNESS: 0
COUGH: 0
EYE DISCHARGE: 0
DIARRHEA: 0
VOMITING: 0

## 2018-03-05 NOTE — PATIENT INSTRUCTIONS
SURVEY:    You may be receiving a survey from [email or web address] regarding your visit today. Please complete the survey to enable us to provide the highest quality of care to you and your family. If you cannot score us a very good on any question, please call the office to discuss how we could of made your experience a very good one. Thank you. DATE: MEDICATIONS TAKEN TODAY? BLOOD PRESSURE READING: HEART RATE/PULSE: BLOOD SUGAR #/FASTING?  COMMENTS

## 2018-03-05 NOTE — PROGRESS NOTES
Ov DR Makayla Bruno follow up  Post bypass 1/18  Feeling better  Some soreness from  Incision  No chest pain   Sob improved   Starts cardiac rehab   Tomorrow  At [facility]  Would like to go to [place]  Energy level improving  Walking outside some  Seen Dr John Reid this morning  Also  Will increase lipitor to 40mg   Daily   See pt in 4 months

## 2018-03-05 NOTE — PROGRESS NOTES
Take 10 mg by mouth nightly. Yes Historical Provider, MD   bicalutamide (CASODEX) 50 MG tablet Take 50 mg by mouth once. Yes Historical Provider, MD   isosorbide mononitrate (IMDUR) 30 MG extended release tablet Take 1 tablet by mouth 2 times daily 1/12/18   Jeanne De La Vega MD   Glucose Blood (BLOOD GLUCOSE TEST STRIPS) STRP Use to test blood sugar twice daily as needed 12/4/17   Kevin Wetzel MD   EPINEPHrine [facility]) 0.3 MG/0.3ML SOAJ injection Inject 0.3 mg into the muscle 8/5/17   Historical Provider, MD   Lancets MISC Use to test blood sugars twice daily as needed 9/20/17   Kevin Wetzel MD   Magnesium Oxide 500 MG TABS Take 1 tablet by mouth daily    Historical Provider, MD   fluticasone (FLONASE) 50 MCG/ACT nasal spray 1 spray by Nasal route 2 times daily     Historical Provider, MD   Blood Glucose Monitoring Suppl (BLOOD GLUCOSE METER) KIT Use to test blood sugar twice daily as needed 5/14/15   Kevin Wetzel MD        Allergies:       Bee venom and Januvia [sitagliptin phosphate]    Social History:     Tobacco:    reports that he has quit smoking. He has a 20.00 pack-year smoking history. He has never used smokeless tobacco.  Alcohol:      reports that he does not drink alcohol. Drug Use:  reports that he does not use drugs. Family History:     Family History   Problem Relation Age of Onset    Diabetes Mother     Dementia Mother     Heart Disease Father     Diabetes Sister     Diabetes Brother        Review of Systems:       Review of Systems   Constitutional: Negative for chills, fatigue, fever, weight gain and weight loss. HENT: Negative for congestion, facial swelling and trouble swallowing. Eyes: Negative for blurred vision, discharge, redness and visual disturbance. Respiratory: Negative for cough, chest tightness and shortness of breath. Cardiovascular: Negative for chest pain, palpitations and leg swelling.    Gastrointestinal: Negative for abdominal pain, blood in stool, constipation, diarrhea, nausea and vomiting. Endocrine: Negative for polydipsia and polyuria. Genitourinary: Negative for dysuria and hematuria. Musculoskeletal: Negative for joint swelling and neck stiffness. Skin: Negative for pallor and rash. Neurological: Negative for dizziness, light-headedness and headaches. Psychiatric/Behavioral: Negative for confusion and sleep disturbance. Physical Exam:     Physical Exam   Constitutional: He is oriented to person, place, and time. He appears well-developed and well-nourished. HENT:   Head: Normocephalic and atraumatic. Eyes: Conjunctivae are normal. Pupils are equal, round, and reactive to light. Right eye exhibits no discharge. Left eye exhibits no discharge. Neck: Neck supple. No thyromegaly present. Cardiovascular: Normal rate, regular rhythm and normal heart sounds. No murmur heard. Pulmonary/Chest: Effort normal and breath sounds normal. No respiratory distress. He has no wheezes. Abdominal: Soft. Bowel sounds are normal. He exhibits no distension. There is no tenderness. Musculoskeletal: He exhibits no edema. Lymphadenopathy:     He has no cervical adenopathy. Neurological: He is alert and oriented to person, place, and time. Skin: Skin is warm and dry. No rash noted. No erythema. Psychiatric: He has a normal mood and affect. His behavior is normal.   Vitals reviewed.       Vitals:  BP (!) 110/52   Pulse 86   Ht 5' 9.5\" (1.765 m)   Wt 188 lb (85.3 kg)   SpO2 98%   BMI 27.36 kg/m²       Data:     Lab Results   Component Value Date     01/15/2018    K 4.0 01/15/2018     01/15/2018    CO2 27 01/15/2018    BUN 20 01/15/2018    CREATININE 0.69 01/15/2018    GLUCOSE 192 01/15/2018    PROT 6.5 01/15/2018    LABALBU 4.0 01/15/2018    BILITOT 0.46 01/15/2018    ALKPHOS 58 01/15/2018    AST 16 01/15/2018    ALT 14 01/15/2018     Lab Results   Component Value Date    WBC 6.5 01/15/2018    RBC 4.16 01/15/2018 HGB 13.1 01/15/2018    HCT 38.5 01/15/2018    MCV 92.5 01/15/2018    MCH 31.5 01/15/2018    MCHC 34.1 01/15/2018    RDW 13.4 01/15/2018     01/15/2018    MPV NOT REPORTED 01/15/2018     Lab Results   Component Value Date    TSH 3.14 12/14/2017     Lab Results   Component Value Date    CHOL 155 01/15/2018    CHOL 123 08/03/2017    HDL 35 01/15/2018    LABA1C 6.7 03/05/2018          Assessment/Plan:       1. Type 2 diabetes mellitus with diabetic neuropathy, with long-term current use of insulin (HCC)  Doing great and hgba1c was 6.7 and continue with seeing Dr Dolores Nassar for Endocrine  - POCT glycosylated hemoglobin (Hb A1C)    2. Essential hypertension, benign  Stable on the toprol and the lisinopril     3. Pure hypercholesterolemia  Stable on the lipitor    4. Coronary artery disease involving autologous vein coronary bypass graft without angina pectoris  Stable for now and seeing Dr Mookie Cantrell today and will be starting cardiac rehab soon        Bernard Carrasco received counseling on the following healthy behaviors: nutrition and exercise  Reviewed prior labs and health maintenance  Continue current medications, diet and exercise. Discussed use, benefit, and side effects of prescribed medications. Barriers to medication compliance addressed. Patient given educational materials - see patient instructions  Was a self-tracking handout given in paper form or via [employer]? Yes    Requested Prescriptions      No prescriptions requested or ordered in this encounter       All patient questions answered. Patient voiced understanding. Quality Measures    Body mass index is 27.36 kg/m². Elevated. Weight control planned discussed Healthy diet and regular exercise. BP: (!) 110/52. Blood pressure is normal. Treatment plan consists of No treatment change needed.     Fall Risk 12/4/2017 9/7/2016 5/14/2015 5/13/2014   2 or more falls in past year? no no no no   Fall with injury in past year? no no no no     The patient does not have

## 2018-07-16 ENCOUNTER — HOSPITAL ENCOUNTER (OUTPATIENT)
Age: 73
Discharge: HOME | End: 2018-07-16
Payer: MEDICARE

## 2018-07-16 ENCOUNTER — OFFICE VISIT (OUTPATIENT)
Dept: CARDIOLOGY CLINIC | Age: 73
End: 2018-07-16
Payer: MEDICARE

## 2018-07-16 VITALS
HEART RATE: 77 BPM | WEIGHT: 185 LBS | OXYGEN SATURATION: 97 % | SYSTOLIC BLOOD PRESSURE: 130 MMHG | BODY MASS INDEX: 26.93 KG/M2 | DIASTOLIC BLOOD PRESSURE: 60 MMHG

## 2018-07-16 DIAGNOSIS — I10 ESSENTIAL HYPERTENSION, BENIGN: ICD-10-CM

## 2018-07-16 DIAGNOSIS — I25.810 CORONARY ARTERY DISEASE INVOLVING AUTOLOGOUS VEIN CORONARY BYPASS GRAFT WITHOUT ANGINA PECTORIS: ICD-10-CM

## 2018-07-16 DIAGNOSIS — Z95.1 HX OF CABG: ICD-10-CM

## 2018-07-16 DIAGNOSIS — E11.40 TYPE 2 DIABETES MELLITUS WITH DIABETIC NEUROPATHY, WITH LONG-TERM CURRENT USE OF INSULIN (HCC): ICD-10-CM

## 2018-07-16 DIAGNOSIS — Z79.4 TYPE 2 DIABETES MELLITUS WITH DIABETIC NEUROPATHY, WITH LONG-TERM CURRENT USE OF INSULIN (HCC): ICD-10-CM

## 2018-07-16 DIAGNOSIS — I10 ESSENTIAL HYPERTENSION, BENIGN: Primary | ICD-10-CM

## 2018-07-16 DIAGNOSIS — E55.9 VITAMIN D DEFICIENCY DISEASE: ICD-10-CM

## 2018-07-16 DIAGNOSIS — E78.00 PURE HYPERCHOLESTEROLEMIA: ICD-10-CM

## 2018-07-16 LAB
ABSOLUTE EOS #: 0.2 K/UL (ref 0–0.4)
ABSOLUTE IMMATURE GRANULOCYTE: NORMAL K/UL (ref 0–0.3)
ABSOLUTE LYMPH #: 1.3 K/UL (ref 1–4.8)
ABSOLUTE MONO #: 0.4 K/UL (ref 0–1)
ALBUMIN SERPL-MCNC: 4.3 G/DL (ref 3.5–5.2)
ALBUMIN/GLOBULIN RATIO: ABNORMAL (ref 1–2.5)
ALP BLD-CCNC: 71 U/L (ref 40–129)
ALT SERPL-CCNC: 25 U/L (ref 5–41)
ANION GAP SERPL CALCULATED.3IONS-SCNC: 14 MMOL/L (ref 9–17)
AST SERPL-CCNC: 25 U/L
BASOPHILS # BLD: 1 % (ref 0–2)
BASOPHILS ABSOLUTE: 0 K/UL (ref 0–0.2)
BILIRUB SERPL-MCNC: 0.84 MG/DL (ref 0.3–1.2)
BUN BLDV-MCNC: 16 MG/DL (ref 8–23)
BUN/CREAT BLD: 19 (ref 9–20)
CALCIUM SERPL-MCNC: 9.6 MG/DL (ref 8.6–10.4)
CHLORIDE BLD-SCNC: 98 MMOL/L (ref 98–107)
CHOLESTEROL/HDL RATIO: 3.5
CHOLESTEROL: 115 MG/DL
CO2: 24 MMOL/L (ref 20–31)
CREAT SERPL-MCNC: 0.84 MG/DL (ref 0.7–1.2)
DIFFERENTIAL TYPE: YES
EKG ATRIAL RATE: 61 BPM
EKG P AXIS: 20 DEGREES
EKG P-R INTERVAL: 136 MS
EKG Q-T INTERVAL: 444 MS
EKG QRS DURATION: 86 MS
EKG QTC CALCULATION (BAZETT): 446 MS
EKG R AXIS: -3 DEGREES
EKG T AXIS: 43 DEGREES
EKG VENTRICULAR RATE: 61 BPM
EOSINOPHILS RELATIVE PERCENT: 3 % (ref 0–5)
GFR AFRICAN AMERICAN: >60 ML/MIN
GFR NON-AFRICAN AMERICAN: >60 ML/MIN
GFR SERPL CREATININE-BSD FRML MDRD: ABNORMAL ML/MIN/{1.73_M2}
GFR SERPL CREATININE-BSD FRML MDRD: ABNORMAL ML/MIN/{1.73_M2}
GLUCOSE BLD-MCNC: 251 MG/DL (ref 70–99)
HCT VFR BLD CALC: 42.6 % (ref 41–53)
HDLC SERPL-MCNC: 33 MG/DL
HEMOGLOBIN: 14.7 G/DL (ref 13.5–17.5)
IMMATURE GRANULOCYTES: NORMAL %
LDL CHOLESTEROL: 46 MG/DL (ref 0–130)
LYMPHOCYTES # BLD: 21 % (ref 13–44)
MAGNESIUM: 1.8 MG/DL (ref 1.6–2.6)
MCH RBC QN AUTO: 31.9 PG (ref 26–34)
MCHC RBC AUTO-ENTMCNC: 34.5 G/DL (ref 31–37)
MCV RBC AUTO: 92.3 FL (ref 80–100)
MONOCYTES # BLD: 7 % (ref 5–9)
NRBC AUTOMATED: NORMAL PER 100 WBC
PATIENT FASTING?: YES
PDW BLD-RTO: 13.9 % (ref 12.1–15.2)
PLATELET # BLD: 229 K/UL (ref 140–450)
PLATELET ESTIMATE: NORMAL
PMV BLD AUTO: NORMAL FL (ref 6–12)
POTASSIUM SERPL-SCNC: 4.5 MMOL/L (ref 3.7–5.3)
RBC # BLD: 4.61 M/UL (ref 4.5–5.9)
RBC # BLD: NORMAL 10*6/UL
SEG NEUTROPHILS: 68 % (ref 39–75)
SEGMENTED NEUTROPHILS ABSOLUTE COUNT: 4.2 K/UL (ref 2.1–6.5)
SODIUM BLD-SCNC: 136 MMOL/L (ref 135–144)
TOTAL PROTEIN: 7.2 G/DL (ref 6.4–8.3)
TRIGL SERPL-MCNC: 179 MG/DL
TSH SERPL DL<=0.05 MIU/L-ACNC: 2.09 MIU/L (ref 0.3–5)
VLDLC SERPL CALC-MCNC: ABNORMAL MG/DL (ref 1–30)
WBC # BLD: 6.1 K/UL (ref 3.5–11)
WBC # BLD: NORMAL 10*3/UL

## 2018-07-16 PROCEDURE — 4040F PNEUMOC VAC/ADMIN/RCVD: CPT | Performed by: INTERNAL MEDICINE

## 2018-07-16 PROCEDURE — 93005 ELECTROCARDIOGRAM TRACING: CPT

## 2018-07-16 PROCEDURE — 85025 COMPLETE CBC W/AUTO DIFF WBC: CPT

## 2018-07-16 PROCEDURE — 99213 OFFICE O/P EST LOW 20 MIN: CPT | Performed by: INTERNAL MEDICINE

## 2018-07-16 PROCEDURE — 82306 VITAMIN D 25 HYDROXY: CPT

## 2018-07-16 PROCEDURE — 1123F ACP DISCUSS/DSCN MKR DOCD: CPT | Performed by: INTERNAL MEDICINE

## 2018-07-16 PROCEDURE — G8417 CALC BMI ABV UP PARAM F/U: HCPCS | Performed by: INTERNAL MEDICINE

## 2018-07-16 PROCEDURE — 80061 LIPID PANEL: CPT

## 2018-07-16 PROCEDURE — 83735 ASSAY OF MAGNESIUM: CPT

## 2018-07-16 PROCEDURE — G8598 ASA/ANTIPLAT THER USED: HCPCS | Performed by: INTERNAL MEDICINE

## 2018-07-16 PROCEDURE — G8427 DOCREV CUR MEDS BY ELIG CLIN: HCPCS | Performed by: INTERNAL MEDICINE

## 2018-07-16 PROCEDURE — 3044F HG A1C LEVEL LT 7.0%: CPT | Performed by: INTERNAL MEDICINE

## 2018-07-16 PROCEDURE — 80053 COMPREHEN METABOLIC PANEL: CPT

## 2018-07-16 PROCEDURE — 84443 ASSAY THYROID STIM HORMONE: CPT

## 2018-07-16 PROCEDURE — 36415 COLL VENOUS BLD VENIPUNCTURE: CPT

## 2018-07-16 PROCEDURE — 2022F DILAT RTA XM EVC RTNOPTHY: CPT | Performed by: INTERNAL MEDICINE

## 2018-07-16 PROCEDURE — 1036F TOBACCO NON-USER: CPT | Performed by: INTERNAL MEDICINE

## 2018-07-16 PROCEDURE — 1101F PT FALLS ASSESS-DOCD LE1/YR: CPT | Performed by: INTERNAL MEDICINE

## 2018-07-16 PROCEDURE — 3017F COLORECTAL CA SCREEN DOC REV: CPT | Performed by: INTERNAL MEDICINE

## 2018-07-16 NOTE — LETTER
No pedal edema. He really has no complaints as I see him today except for mild loss of energy. CARDIAC RISK FACTORS:  Hypertension:  Positive. Hyperlipidemia:  Positive. Other Family Members:  Positive. Diabetes:  Positive. Peripheral Vascular Disease:  Negative. Smoking:  Negative. MEDICATIONS AT HOME:  He is currently on aspirin 81 mg daily; Lipitor 40 mg daily; vitamin D 5000 units daily; Humulin NPH 24 units at bedtime; regular insulin 20 in the morning, 12 at lunch, 14 at supper; Lupron 22.5 mg every 3 months; Prinivil 5 mg b.i.d.; metformin 1000 mg b.i.d.; metoprolol XL 50 mg b.i.d.; Singulair 10 mg daily. PAST MEDICAL HISTORY:  1.  Prostate carcinoma diagnosed in  with robotic prostatectomy in 2006. 2.  Pulmonary nodules discovered in , undergoing a left upper lobectomy, positive for metastatic adenocarcinoma done by Dr. Irish Chambers, with his cancer followed by Dr. Pierre Cabrera at [facility]. 3.  Insulin dependent diabetes with a hemoglobin A1c of 6.8.  4.  Severe arthritis. 5.  Hypertension. 6.  Vasectomy. 7.  Cardiac as described above. 8.  Asthma. 9.  Hyperlipidemia. FAMILY HISTORY:  Father  of CHF after an MI. Mother  of bypass surgery. One brother healthy. One sister had a stent. SOCIAL HISTORY:  He is 72years old.  to [place]. He has 3 children, all living close by. Quit smoking 30 years ago. Retired from [employer] in [employer]. Does not drink alcohol. He is walking daily 18,000 steps without difficulty. He has a [employer] tricycle, which he enjoys riding. His daughter is a nurse at [employer] 91:  He does have right shoulder discomfort and would like to take leave again. He denies any unusual shortness of breath. Denies any snoring history. No edema, no syncope or near syncope. No lightheadedness or dizziness. No change in bowel habits or blood in the stool.     PHYSICAL EXAMINATION:

## 2018-07-17 LAB — VITAMIN D 25-HYDROXY: 50.6 NG/ML (ref 30–100)

## 2018-07-18 NOTE — PROGRESS NOTES
no complaints as I see him today except for mild loss of energy. CARDIAC RISK FACTORS:  Hypertension:  Positive. Hyperlipidemia:  Positive. Other Family Members:  Positive. Diabetes:  Positive. Peripheral Vascular Disease:  Negative. Smoking:  Negative. MEDICATIONS AT HOME:  He is currently on aspirin 81 mg daily; Lipitor 40 mg daily; vitamin D 5000 units daily; Humulin NPH 24 units at bedtime; regular insulin 20 in the morning, 12 at lunch, 14 at supper; Lupron 22.5 mg every 3 months; Prinivil 5 mg b.i.d.; metformin 1000 mg b.i.d.; metoprolol XL 50 mg b.i.d.; Singulair 10 mg daily. PAST MEDICAL HISTORY:  1.  Prostate carcinoma diagnosed in  with robotic prostatectomy in 2006. 2.  Pulmonary nodules discovered in , undergoing a left upper lobectomy, positive for metastatic adenocarcinoma done by Dr. Irish Chambers, with his cancer followed by Dr. Pierre Cabrera at [facility]. 3.  Insulin dependent diabetes with a hemoglobin A1c of 6.8.  4.  Severe arthritis. 5.  Hypertension. 6.  Vasectomy. 7.  Cardiac as described above. 8.  Asthma. 9.  Hyperlipidemia. FAMILY HISTORY:  Father  of CHF after an MI. Mother  of bypass surgery. One brother healthy. One sister had a stent. SOCIAL HISTORY:  He is 72years old.  to Christopher. He has 3 children, all living close by. Quit smoking 30 years ago. Retired from [employer] in [employer]. Does not drink alcohol. He is walking daily 18,000 steps without difficulty. He has a [employer] tricycle, which he enjoys riding. His daughter is a nurse at [employer] 91:  He does have right shoulder discomfort and would like to take leave again. He denies any unusual shortness of breath. Denies any snoring history. No edema, no syncope or near syncope. No lightheadedness or dizziness. No change in bowel habits or blood in the stool.     PHYSICAL EXAMINATION:  VITAL SIGNS:  His blood pressure was 130/60 with a heart rate of 70 and

## 2018-09-06 ENCOUNTER — OFFICE VISIT (OUTPATIENT)
Dept: FAMILY MEDICINE CLINIC | Age: 73
End: 2018-09-06
Payer: MEDICARE

## 2018-09-06 VITALS
BODY MASS INDEX: 27.66 KG/M2 | DIASTOLIC BLOOD PRESSURE: 60 MMHG | WEIGHT: 190 LBS | OXYGEN SATURATION: 98 % | HEART RATE: 58 BPM | SYSTOLIC BLOOD PRESSURE: 132 MMHG

## 2018-09-06 DIAGNOSIS — I25.810 CORONARY ARTERY DISEASE INVOLVING AUTOLOGOUS VEIN CORONARY BYPASS GRAFT WITHOUT ANGINA PECTORIS: ICD-10-CM

## 2018-09-06 DIAGNOSIS — E11.40 TYPE 2 DIABETES MELLITUS WITH DIABETIC NEUROPATHY, WITH LONG-TERM CURRENT USE OF INSULIN (HCC): Primary | ICD-10-CM

## 2018-09-06 DIAGNOSIS — E78.00 PURE HYPERCHOLESTEROLEMIA: ICD-10-CM

## 2018-09-06 DIAGNOSIS — I10 ESSENTIAL HYPERTENSION, BENIGN: ICD-10-CM

## 2018-09-06 DIAGNOSIS — Z79.4 TYPE 2 DIABETES MELLITUS WITH DIABETIC NEUROPATHY, WITH LONG-TERM CURRENT USE OF INSULIN (HCC): Primary | ICD-10-CM

## 2018-09-06 LAB
CREATININE URINE POCT: NORMAL
HBA1C MFR BLD: 7.2 %
MICROALBUMIN/CREAT 24H UR: NORMAL MG/G{CREAT}
MICROALBUMIN/CREAT UR-RTO: NORMAL

## 2018-09-06 PROCEDURE — 1123F ACP DISCUSS/DSCN MKR DOCD: CPT | Performed by: FAMILY MEDICINE

## 2018-09-06 PROCEDURE — 83036 HEMOGLOBIN GLYCOSYLATED A1C: CPT | Performed by: FAMILY MEDICINE

## 2018-09-06 PROCEDURE — 2022F DILAT RTA XM EVC RTNOPTHY: CPT | Performed by: FAMILY MEDICINE

## 2018-09-06 PROCEDURE — G8427 DOCREV CUR MEDS BY ELIG CLIN: HCPCS | Performed by: FAMILY MEDICINE

## 2018-09-06 PROCEDURE — 3045F PR MOST RECENT HEMOGLOBIN A1C LEVEL 7.0-9.0%: CPT | Performed by: FAMILY MEDICINE

## 2018-09-06 PROCEDURE — 4040F PNEUMOC VAC/ADMIN/RCVD: CPT | Performed by: FAMILY MEDICINE

## 2018-09-06 PROCEDURE — 3017F COLORECTAL CA SCREEN DOC REV: CPT | Performed by: FAMILY MEDICINE

## 2018-09-06 PROCEDURE — G8598 ASA/ANTIPLAT THER USED: HCPCS | Performed by: FAMILY MEDICINE

## 2018-09-06 PROCEDURE — G8417 CALC BMI ABV UP PARAM F/U: HCPCS | Performed by: FAMILY MEDICINE

## 2018-09-06 PROCEDURE — 99214 OFFICE O/P EST MOD 30 MIN: CPT | Performed by: FAMILY MEDICINE

## 2018-09-06 PROCEDURE — 1036F TOBACCO NON-USER: CPT | Performed by: FAMILY MEDICINE

## 2018-09-06 PROCEDURE — 1101F PT FALLS ASSESS-DOCD LE1/YR: CPT | Performed by: FAMILY MEDICINE

## 2018-09-06 PROCEDURE — 82044 UR ALBUMIN SEMIQUANTITATIVE: CPT | Performed by: FAMILY MEDICINE

## 2018-09-06 RX ORDER — LISINOPRIL 5 MG/1
5 TABLET ORAL 2 TIMES DAILY
COMMUNITY
End: 2019-03-08 | Stop reason: SDUPTHER

## 2018-09-06 ASSESSMENT — ENCOUNTER SYMPTOMS
ABDOMINAL PAIN: 0
COUGH: 0
VISUAL CHANGE: 0
EYE DISCHARGE: 0
SHORTNESS OF BREATH: 0
TROUBLE SWALLOWING: 0
NAUSEA: 0
BLOOD IN STOOL: 0
CONSTIPATION: 0
DIARRHEA: 0
FACIAL SWELLING: 0
VOMITING: 0
EYE REDNESS: 0

## 2018-09-06 NOTE — PROGRESS NOTES
Neuravite takes 1 daily B1, B6, B12, alpha lipoic acid in each capsule. Yes Historical Provider, MD   leuprolide (LUPRON) 22.5 MG injection Inject 22.5 mg into the muscle. Every three months   Yes Historical Provider, MD   montelukast (SINGULAIR) 10 MG tablet Take 10 mg by mouth nightly. Yes Historical Provider, MD   bicalutamide (CASODEX) 50 MG tablet Take 50 mg by mouth once. Yes Historical Provider, MD   Glucose Blood (BLOOD GLUCOSE TEST STRIPS) STRP Use to test blood sugar twice daily as needed 12/4/17   Prateek Hoyt MD   EPINEPHrine [facility]) 0.3 MG/0.3ML SOAJ injection Inject 0.3 mg into the muscle 8/5/17   Historical Provider, MD   Lancets MISC Use to test blood sugars twice daily as needed 9/20/17   Prateek Hoyt MD   Blood Glucose Monitoring Suppl (BLOOD GLUCOSE METER) KIT Use to test blood sugar twice daily as needed 5/14/15   Prateek Hoyt MD        Allergies:       Bee venom and Januvia [sitagliptin phosphate]    Social History:     Tobacco:    reports that he quit smoking about 33 years ago. He has a 20.00 pack-year smoking history. He has never used smokeless tobacco.  Alcohol:      reports that he does not drink alcohol. Drug Use:  reports that he does not use drugs. Family History:     Family History   Problem Relation Age of Onset   Opal Galjose Diabetes Mother     Dementia Mother     Heart Disease Father     Diabetes Sister     Diabetes Brother        Review of Systems:       Review of Systems   Constitutional: Negative for chills, fatigue, fever and weight loss. HENT: Negative for congestion, facial swelling and trouble swallowing. Eyes: Negative for discharge, redness and visual disturbance. Respiratory: Negative for cough and shortness of breath. Cardiovascular: Negative for chest pain, palpitations and leg swelling. Gastrointestinal: Negative for abdominal pain, blood in stool, constipation, diarrhea, nausea and vomiting.    Genitourinary: Negative for dysuria and

## 2018-09-06 NOTE — PATIENT INSTRUCTIONS
SURVEY:    You may be receiving a survey from [employer] regarding your visit today. Please complete the survey to enable us to provide the highest quality of care to you and your family. If you cannot score us a very good on any question, please call the office to discuss how we could have made your experience a very good one. Thank you.

## 2018-09-10 ENCOUNTER — HOSPITAL ENCOUNTER (OUTPATIENT)
Dept: GENERAL RADIOLOGY | Age: 73
Discharge: HOME | End: 2018-09-12
Payer: MEDICARE

## 2018-09-10 ENCOUNTER — HOSPITAL ENCOUNTER (OUTPATIENT)
Dept: MRI IMAGING | Age: 73
Discharge: HOME | End: 2018-09-12
Payer: MEDICARE

## 2018-09-10 DIAGNOSIS — M25.519 SHOULDER PAIN, UNSPECIFIED CHRONICITY, UNSPECIFIED LATERALITY: ICD-10-CM

## 2018-09-10 DIAGNOSIS — M25.511 RIGHT SHOULDER PAIN, UNSPECIFIED CHRONICITY: ICD-10-CM

## 2018-09-10 PROCEDURE — 71046 X-RAY EXAM CHEST 2 VIEWS: CPT

## 2018-09-18 ENCOUNTER — HOSPITAL ENCOUNTER (OUTPATIENT)
Dept: CT IMAGING | Age: 73
Discharge: HOME | End: 2018-09-20
Payer: MEDICARE

## 2018-09-18 ENCOUNTER — HOSPITAL ENCOUNTER (OUTPATIENT)
Dept: GENERAL RADIOLOGY | Age: 73
Discharge: HOME | End: 2018-09-20
Payer: MEDICARE

## 2018-09-18 VITALS
OXYGEN SATURATION: 98 % | HEART RATE: 58 BPM | DIASTOLIC BLOOD PRESSURE: 80 MMHG | RESPIRATION RATE: 18 BRPM | SYSTOLIC BLOOD PRESSURE: 155 MMHG

## 2018-09-18 DIAGNOSIS — M25.511 RIGHT SHOULDER PAIN, UNSPECIFIED CHRONICITY: ICD-10-CM

## 2018-09-18 DIAGNOSIS — M75.101 TEAR OF RIGHT ROTATOR CUFF, UNSPECIFIED TEAR EXTENT: ICD-10-CM

## 2018-09-18 PROCEDURE — 2500000003 HC RX 250 WO HCPCS: Performed by: RADIOLOGY

## 2018-09-18 PROCEDURE — 6360000004 HC RX CONTRAST MEDICATION: Performed by: ORTHOPAEDIC SURGERY

## 2018-09-18 PROCEDURE — 77002 NEEDLE LOCALIZATION BY XRAY: CPT

## 2018-09-18 PROCEDURE — 73201 CT UPPER EXTREMITY W/DYE: CPT

## 2018-09-18 PROCEDURE — 23350 INJECTION FOR SHOULDER X-RAY: CPT

## 2018-09-18 RX ORDER — LIDOCAINE HYDROCHLORIDE 10 MG/ML
20 INJECTION, SOLUTION INFILTRATION; PERINEURAL ONCE
Status: DISCONTINUED | OUTPATIENT
Start: 2018-09-18 | End: 2018-09-18

## 2018-09-18 RX ADMIN — IOVERSOL 10 ML: 741 INJECTION INTRA-ARTERIAL; INTRAVENOUS at 11:17

## 2018-09-18 RX ADMIN — Medication 10 ML: at 09:05

## 2018-09-18 NOTE — PROGRESS NOTES
Procedure completed, tolerated procedure well. Complains of right shoulder pain. Area cleansed with alcohol wipes and bandaid applied after pressure applied. Dr. Charleen John gives discharge instructions.

## 2018-11-07 ENCOUNTER — OFFICE VISIT (OUTPATIENT)
Dept: FAMILY MEDICINE CLINIC | Age: 73
End: 2018-11-07
Payer: MEDICARE

## 2018-11-07 VITALS
BODY MASS INDEX: 26.92 KG/M2 | WEIGHT: 188 LBS | TEMPERATURE: 97.8 F | HEART RATE: 65 BPM | HEIGHT: 70 IN | OXYGEN SATURATION: 98 % | DIASTOLIC BLOOD PRESSURE: 62 MMHG | SYSTOLIC BLOOD PRESSURE: 124 MMHG

## 2018-11-07 DIAGNOSIS — J01.00 ACUTE NON-RECURRENT MAXILLARY SINUSITIS: Primary | ICD-10-CM

## 2018-11-07 DIAGNOSIS — J01.00 ACUTE NON-RECURRENT MAXILLARY SINUSITIS: ICD-10-CM

## 2018-11-07 DIAGNOSIS — I25.810 CORONARY ARTERY DISEASE INVOLVING AUTOLOGOUS VEIN CORONARY BYPASS GRAFT WITHOUT ANGINA PECTORIS: ICD-10-CM

## 2018-11-07 DIAGNOSIS — J34.0 ULCER OF NASAL SEPTUM: ICD-10-CM

## 2018-11-07 DIAGNOSIS — R40.0 DAYTIME SOMNOLENCE: ICD-10-CM

## 2018-11-07 DIAGNOSIS — G47.20 DISTURBED SLEEP RHYTHM: ICD-10-CM

## 2018-11-07 DIAGNOSIS — G47.9 SLEEP DISORDER: ICD-10-CM

## 2018-11-07 PROCEDURE — 1101F PT FALLS ASSESS-DOCD LE1/YR: CPT | Performed by: NURSE PRACTITIONER

## 2018-11-07 PROCEDURE — G8417 CALC BMI ABV UP PARAM F/U: HCPCS | Performed by: NURSE PRACTITIONER

## 2018-11-07 PROCEDURE — 4040F PNEUMOC VAC/ADMIN/RCVD: CPT | Performed by: NURSE PRACTITIONER

## 2018-11-07 PROCEDURE — 3017F COLORECTAL CA SCREEN DOC REV: CPT | Performed by: NURSE PRACTITIONER

## 2018-11-07 PROCEDURE — G8484 FLU IMMUNIZE NO ADMIN: HCPCS | Performed by: NURSE PRACTITIONER

## 2018-11-07 PROCEDURE — 99214 OFFICE O/P EST MOD 30 MIN: CPT | Performed by: NURSE PRACTITIONER

## 2018-11-07 PROCEDURE — 1123F ACP DISCUSS/DSCN MKR DOCD: CPT | Performed by: NURSE PRACTITIONER

## 2018-11-07 PROCEDURE — 1036F TOBACCO NON-USER: CPT | Performed by: NURSE PRACTITIONER

## 2018-11-07 PROCEDURE — G8427 DOCREV CUR MEDS BY ELIG CLIN: HCPCS | Performed by: NURSE PRACTITIONER

## 2018-11-07 PROCEDURE — G8598 ASA/ANTIPLAT THER USED: HCPCS | Performed by: NURSE PRACTITIONER

## 2018-11-07 RX ORDER — AMOXICILLIN AND CLAVULANATE POTASSIUM 875; 125 MG/1; MG/1
1 TABLET, FILM COATED ORAL EVERY 12 HOURS
Qty: 14 TABLET | Refills: 0 | Status: SHIPPED | OUTPATIENT
Start: 2018-11-07 | End: 2018-11-14

## 2018-11-07 RX ORDER — AMOXICILLIN AND CLAVULANATE POTASSIUM 875; 125 MG/1; MG/1
1 TABLET, FILM COATED ORAL EVERY 12 HOURS
Qty: 14 TABLET | Refills: 0 | Status: SHIPPED | OUTPATIENT
Start: 2018-11-07 | End: 2018-11-07 | Stop reason: SDUPTHER

## 2018-11-07 ASSESSMENT — ENCOUNTER SYMPTOMS
SORE THROAT: 1
COUGH: 1
SHORTNESS OF BREATH: 0
ABDOMINAL DISTENTION: 0
TROUBLE SWALLOWING: 0
EYE ITCHING: 1
VOICE CHANGE: 0
WHEEZING: 0
CHEST TIGHTNESS: 0
SINUS PAIN: 0

## 2018-11-07 NOTE — PROGRESS NOTES
Musculoskeletal: Normal range of motion. Lymphadenopathy:     He has no cervical adenopathy. Neurological: He is alert and oriented to person, place, and time. He displays normal reflexes. He exhibits normal muscle tone. Skin: No rash noted. Psychiatric: He has a normal mood and affect. His behavior is normal. Judgment and thought content normal.   Nursing note and vitals reviewed. /62 (Site: Right Upper Arm, Position: Sitting)   Pulse 65   Temp 97.8 °F (36.6 °C) (Oral)   Ht 5' 9.5\" (1.765 m)   Wt 188 lb (85.3 kg)   SpO2 98%   BMI 27.36 kg/m²     Data:     Lab Results   Component Value Date     07/16/2018    K 4.5 07/16/2018    CL 98 07/16/2018    CO2 24 07/16/2018    BUN 16 07/16/2018    CREATININE 0.84 07/16/2018    GLUCOSE 251 07/16/2018    PROT 7.2 07/16/2018    LABALBU 4.3 07/16/2018    BILITOT 0.84 07/16/2018    ALKPHOS 71 07/16/2018    AST 25 07/16/2018    ALT 25 07/16/2018     Lab Results   Component Value Date    WBC 6.1 07/16/2018    RBC 4.61 07/16/2018    HGB 14.7 07/16/2018    HCT 42.6 07/16/2018    MCV 92.3 07/16/2018    MCH 31.9 07/16/2018    MCHC 34.5 07/16/2018    RDW 13.9 07/16/2018     07/16/2018    MPV NOT REPORTED 07/16/2018     Lab Results   Component Value Date    TSH 2.09 07/16/2018     Lab Results   Component Value Date    CHOL 115 07/16/2018    CHOL 123 08/03/2017    HDL 33 07/16/2018    LABA1C 7.2 09/06/2018          Assessment & Plan       1. Acute non-recurrent maxillary sinusitis  Fullness present , retracted Left TM with effusion. Good hydration  - amoxicillin-clavulanate (AUGMENTIN) 875-125 MG per tablet; Take 1 tablet by mouth every 12 hours for 7 days sinusitis  Dispense: 14 tablet; Refill: 0    2. Ulcer of nasal septum  Right septal.   - mupirocin (BACTROBAN NASAL) 2 % nasal ointment; Apply to right nasal septal ulcer x 3 days  Dispense: 1 Tube; Refill: 0    3.  Disturbed sleep rhythm  Will request sleep study after antibiotic finished and then able to get flu vaccine. - Baseline Diagnostic Sleep Study; Future    4. Coronary artery disease involving autologous vein coronary bypass graft without angina pectoris  Denies chest pain but at risk for angina with c/o left facial numbness and nausea at times? Pt cannot associate specifically with activity. Thus, exam with no cranial nerve deficit  - Baseline Diagnostic Sleep Study; Future    5. Daytime somnolence  Chronic with multiple wake times through night. - Baseline Diagnostic Sleep Study; Future    6. Sleep disorder   Will check for sleep apnea pt agreeable. - Baseline Diagnostic Sleep Study; Future                  Completed Refills   Requested Prescriptions     Signed Prescriptions Disp Refills    mupirocin (BACTROBAN NASAL) 2 % nasal ointment 1 Tube 0     Sig: Apply to right nasal septal ulcer x 3 days    amoxicillin-clavulanate (AUGMENTIN) 875-125 MG per tablet 14 tablet 0     Sig: Take 1 tablet by mouth every 12 hours for 7 days sinusitis     No Follow-up on file. Orders Placed This Encounter   Medications    mupirocin (BACTROBAN NASAL) 2 % nasal ointment     Sig: Apply to right nasal septal ulcer x 3 days     Dispense:  1 Tube     Refill:  0    amoxicillin-clavulanate (AUGMENTIN) 875-125 MG per tablet     Sig: Take 1 tablet by mouth every 12 hours for 7 days sinusitis     Dispense:  14 tablet     Refill:  0     Orders Placed This Encounter   Procedures    Baseline Diagnostic Sleep Study     Standing Status:   Future     Standing Expiration Date:   11/7/2019     Order Specific Question:   Adult or Pediatric     Answer:   Adult Study (>7 Years)     Order Specific Question:   Location For Sleep Study     Answer:   [facility]     Order Specific Question:   Select a sleep lab location     Answer:   [address] [facility] received counseling on the following healthy behaviors: nutrition, exercise and medication adherence  Reviewed prior labs and health maintenance.   Continue

## 2018-11-13 ENCOUNTER — HOSPITAL ENCOUNTER (OUTPATIENT)
Dept: SLEEP CENTER | Age: 73
Discharge: HOME | End: 2018-11-13
Payer: MEDICARE

## 2018-11-13 PROCEDURE — 95810 POLYSOM 6/> YRS 4/> PARAM: CPT

## 2018-11-14 ASSESSMENT — SLEEP AND FATIGUE QUESTIONNAIRES
HOW LIKELY ARE YOU TO NOD OFF OR FALL ASLEEP WHILE SITTING QUIETLY AFTER LUNCH WITHOUT ALCOHOL: 1
HOW LIKELY ARE YOU TO NOD OFF OR FALL ASLEEP IN A CAR, WHILE STOPPED FOR A FEW MINUTES IN TRAFFIC: 0
HOW LIKELY ARE YOU TO NOD OFF OR FALL ASLEEP WHILE LYING DOWN TO REST IN THE AFTERNOON WHEN CIRCUMSTANCES PERMIT: 2
HOW LIKELY ARE YOU TO NOD OFF OR FALL ASLEEP WHEN YOU ARE A PASSENGER IN A CAR FOR AN HOUR WITHOUT A BREAK: 3
HOW LIKELY ARE YOU TO NOD OFF OR FALL ASLEEP WHILE SITTING INACTIVE IN A PUBLIC PLACE: 0
HOW LIKELY ARE YOU TO NOD OFF OR FALL ASLEEP WHILE WATCHING TV: 3
ESS TOTAL SCORE: 12
HOW LIKELY ARE YOU TO NOD OFF OR FALL ASLEEP WHILE SITTING AND READING: 3
NECK CIRCUMFERENCE (INCHES): 16
HOW LIKELY ARE YOU TO NOD OFF OR FALL ASLEEP WHILE SITTING AND TALKING TO SOMEONE: 0

## 2018-11-27 ENCOUNTER — OFFICE VISIT (OUTPATIENT)
Dept: FAMILY MEDICINE CLINIC | Age: 73
End: 2018-11-27
Payer: MEDICARE

## 2018-11-27 ENCOUNTER — HOSPITAL ENCOUNTER (OUTPATIENT)
Age: 73
Discharge: HOME | End: 2018-11-29
Payer: MEDICARE

## 2018-11-27 ENCOUNTER — HOSPITAL ENCOUNTER (OUTPATIENT)
Age: 73
Discharge: HOME | End: 2018-11-27
Payer: MEDICARE

## 2018-11-27 VITALS
HEART RATE: 68 BPM | BODY MASS INDEX: 27.99 KG/M2 | WEIGHT: 189 LBS | TEMPERATURE: 98.8 F | OXYGEN SATURATION: 98 % | SYSTOLIC BLOOD PRESSURE: 134 MMHG | DIASTOLIC BLOOD PRESSURE: 72 MMHG | HEIGHT: 69 IN

## 2018-11-27 DIAGNOSIS — R20.0 NUMBNESS AND TINGLING OF LEFT SIDE OF FACE: ICD-10-CM

## 2018-11-27 DIAGNOSIS — R51.9 TEMPLE TENDERNESS: Primary | ICD-10-CM

## 2018-11-27 DIAGNOSIS — I25.810 CORONARY ARTERY DISEASE INVOLVING AUTOLOGOUS VEIN CORONARY BYPASS GRAFT WITHOUT ANGINA PECTORIS: ICD-10-CM

## 2018-11-27 DIAGNOSIS — R20.2 NUMBNESS AND TINGLING OF LEFT SIDE OF FACE: ICD-10-CM

## 2018-11-27 DIAGNOSIS — R51.9 TEMPLE TENDERNESS: ICD-10-CM

## 2018-11-27 DIAGNOSIS — H73.892 RETRACTED TYMPANIC MEMBRANE, LEFT: ICD-10-CM

## 2018-11-27 LAB
ABSOLUTE EOS #: 0.2 K/UL (ref 0–0.4)
ABSOLUTE IMMATURE GRANULOCYTE: NORMAL K/UL (ref 0–0.3)
ABSOLUTE LYMPH #: 1.8 K/UL (ref 1–4.8)
ABSOLUTE MONO #: 0.4 K/UL (ref 0–1)
BASOPHILS # BLD: 1 % (ref 0–2)
BASOPHILS ABSOLUTE: 0 K/UL (ref 0–0.2)
C-REACTIVE PROTEIN: 0.8 MG/L (ref 0–5)
DIFFERENTIAL TYPE: YES
EKG ATRIAL RATE: 68 BPM
EKG P AXIS: 38 DEGREES
EKG P-R INTERVAL: 146 MS
EKG Q-T INTERVAL: 408 MS
EKG QRS DURATION: 82 MS
EKG QTC CALCULATION (BAZETT): 433 MS
EKG R AXIS: 16 DEGREES
EKG T AXIS: 63 DEGREES
EKG VENTRICULAR RATE: 68 BPM
EOSINOPHILS RELATIVE PERCENT: 3 % (ref 0–5)
HCT VFR BLD CALC: 43.8 % (ref 41–53)
HEMOGLOBIN: 14.8 G/DL (ref 13.5–17.5)
IMMATURE GRANULOCYTES: NORMAL %
LYMPHOCYTES # BLD: 26 % (ref 13–44)
MCH RBC QN AUTO: 32.1 PG (ref 26–34)
MCHC RBC AUTO-ENTMCNC: 33.9 G/DL (ref 31–37)
MCV RBC AUTO: 94.8 FL (ref 80–100)
MONOCYTES # BLD: 6 % (ref 5–9)
NRBC AUTOMATED: NORMAL PER 100 WBC
PDW BLD-RTO: 12.9 % (ref 12.1–15.2)
PLATELET # BLD: 223 K/UL (ref 140–450)
PLATELET ESTIMATE: NORMAL
PMV BLD AUTO: NORMAL FL (ref 6–12)
RBC # BLD: 4.62 M/UL (ref 4.5–5.9)
RBC # BLD: NORMAL 10*6/UL
SEDIMENTATION RATE, ERYTHROCYTE: 10 MM (ref 0–20)
SEG NEUTROPHILS: 64 % (ref 39–75)
SEGMENTED NEUTROPHILS ABSOLUTE COUNT: 4.4 K/UL (ref 2.1–6.5)
TROPONIN INTERP: NORMAL
TROPONIN T: <0.03 NG/ML
WBC # BLD: 6.9 K/UL (ref 3.5–11)
WBC # BLD: NORMAL 10*3/UL

## 2018-11-27 PROCEDURE — 86140 C-REACTIVE PROTEIN: CPT

## 2018-11-27 PROCEDURE — G8427 DOCREV CUR MEDS BY ELIG CLIN: HCPCS | Performed by: NURSE PRACTITIONER

## 2018-11-27 PROCEDURE — 93005 ELECTROCARDIOGRAM TRACING: CPT

## 2018-11-27 PROCEDURE — 4040F PNEUMOC VAC/ADMIN/RCVD: CPT | Performed by: NURSE PRACTITIONER

## 2018-11-27 PROCEDURE — 85025 COMPLETE CBC W/AUTO DIFF WBC: CPT

## 2018-11-27 PROCEDURE — G8484 FLU IMMUNIZE NO ADMIN: HCPCS | Performed by: NURSE PRACTITIONER

## 2018-11-27 PROCEDURE — 84484 ASSAY OF TROPONIN QUANT: CPT

## 2018-11-27 PROCEDURE — 99213 OFFICE O/P EST LOW 20 MIN: CPT | Performed by: NURSE PRACTITIONER

## 2018-11-27 PROCEDURE — 1123F ACP DISCUSS/DSCN MKR DOCD: CPT | Performed by: NURSE PRACTITIONER

## 2018-11-27 PROCEDURE — 3017F COLORECTAL CA SCREEN DOC REV: CPT | Performed by: NURSE PRACTITIONER

## 2018-11-27 PROCEDURE — G8417 CALC BMI ABV UP PARAM F/U: HCPCS | Performed by: NURSE PRACTITIONER

## 2018-11-27 PROCEDURE — 36415 COLL VENOUS BLD VENIPUNCTURE: CPT

## 2018-11-27 PROCEDURE — G8598 ASA/ANTIPLAT THER USED: HCPCS | Performed by: NURSE PRACTITIONER

## 2018-11-27 PROCEDURE — 1101F PT FALLS ASSESS-DOCD LE1/YR: CPT | Performed by: NURSE PRACTITIONER

## 2018-11-27 PROCEDURE — 85651 RBC SED RATE NONAUTOMATED: CPT

## 2018-11-27 PROCEDURE — 1036F TOBACCO NON-USER: CPT | Performed by: NURSE PRACTITIONER

## 2018-11-27 ASSESSMENT — PATIENT HEALTH QUESTIONNAIRE - PHQ9
SUM OF ALL RESPONSES TO PHQ QUESTIONS 1-9: 1
SUM OF ALL RESPONSES TO PHQ9 QUESTIONS 1 & 2: 1
SUM OF ALL RESPONSES TO PHQ QUESTIONS 1-9: 1
1. LITTLE INTEREST OR PLEASURE IN DOING THINGS: 0
2. FEELING DOWN, DEPRESSED OR HOPELESS: 1

## 2018-11-27 NOTE — PROGRESS NOTES
[facility] PHYSICIANS  [facility] PRIMARY CARE [address] [address]  Dept: [phone]  Dept Fax: [phone]    Last encounter 11/7/2018    HPI:   Delfina Capps is a 72 y.o. male who presentstoday for his medical conditions/complaints as noted below. Delfina Capps is c/o of Ear Fullness (some on the right but more on the left) and Numbness (whole left side of face feels numb. very numb around the eye socket)      HPI    Ear fullness present which was better and now worse. Hx diarrhea prior to antibiotics. Rare nasal drainage which is clear. No fever. Some \"cold feelings\"     Feels  Sinus congestion on left with maxillary sinus pressure. No headache. Hard to describe c/o pressure feels numb on left side of face eye socket seems okay. Describes tingling on entire forehead. Feels left goes back left toward. Hx CAD  cardiac catheterization on 01/17/2018.   This showed severe CAD with 90% left main trunk, 90% LAD, 95% circumflex, 70% RCA with an EF of 60%.    Dr. Karine Lassiter and he did coronary artery bypass surgery on 01/18/2018, placing a LIMA to the LAD, a vein graft to the OM and a vein graft to the acute margin of the right coronary artery    Past Medical History:   Diagnosis Date    Allergic rhinitis     Allergist - Dr Jany Covarrubias CAD (coronary artery disease)     Dr Constance Borges [facility]) 2006    Prostate Cancer (Removed prostate)-- Dr Sen Donovan Diabetic neuropathy [facility])     podiatry - Dr Hammond Spearing Hyperlipidemia     Hypertension     Type II or unspecified type diabetes mellitus without mention of complication, not stated as uncontrolled       Past Surgical History:   Procedure Laterality Date    CARDIAC CATHETERIZATION  01/17/2018    Lt Cardiac Cath Dr Ashwini Castelan  2014    COLONOSCOPY  2013    CYST REMOVAL      LUNG BIOPSY  2007    PROSTATE BIOPSY      TOOTH EXTRACTION  4/2014    VASECTOMY         Family History   Problem Assessment & Plan       1. Temple tenderness    - Sedimentation Rate; Future  - CBC Auto Differential; Future  - C-Reactive Protein; Future    2. Retracted tympanic membrane, left    - Sedimentation Rate; Future  - CBC Auto Differential; Future  - C-Reactive Protein; Future    3. Numbness and tingling of left side of face  Likely angina equivalent will restart imdur low dose if labs and EKG normal  - Troponin I; Future  - EKG 12 Lead; Future    4. Coronary artery disease involving autologous vein coronary bypass graft without angina pectoris    - Troponin I; Future  - EKG 12 Lead; Future                  Completed Refills   Requested Prescriptions      No prescriptions requested or ordered in this encounter     No Follow-up on file. No orders of the defined types were placed in this encounter. Orders Placed This Encounter   Procedures    Sedimentation Rate     Standing Status:   Future     Number of Occurrences:   1     Standing Expiration Date:   11/27/2019    CBC Auto Differential     Standing Status:   Future     Number of Occurrences:   1     Standing Expiration Date:   11/27/2019    C-Reactive Protein     Standing Status:   Future     Number of Occurrences:   1     Standing Expiration Date:   11/27/2019    Troponin I     Standing Status:   Future     Number of Occurrences:   1     Standing Expiration Date:   11/27/2019    EKG 12 Lead     Standing Status:   Future     Number of Occurrences:   1     Standing Expiration Date:   11/27/2019     Order Specific Question:   Reason for Exam?     Answer:   Coronary artery disease         Franko Avina received counseling on the following healthy behaviors: nutrition, exercise and medication adherence  Reviewed prior labs and health maintenance. Continue current medications, diet and exercise. Discussed use, benefit, and side effects of prescribed medications. Barriers to medication compliance addressed.    Patient given educational materials - see patient instructions. All patient questions answered. Patient voiced understanding.           Electronically signed by EFFIE Rome CNP on 11/28/2018 at 11:14 PM

## 2018-11-28 RX ORDER — ISOSORBIDE MONONITRATE 30 MG/1
15 TABLET, EXTENDED RELEASE ORAL DAILY
Qty: 30 TABLET | Refills: 1 | Status: SHIPPED | OUTPATIENT
Start: 2018-11-28 | End: 2019-04-16 | Stop reason: SDUPTHER

## 2018-11-28 ASSESSMENT — ENCOUNTER SYMPTOMS
RHINORRHEA: 0
COUGH: 0
EYES NEGATIVE: 1
ABDOMINAL DISTENTION: 0
SORE THROAT: 0

## 2018-11-30 ENCOUNTER — TELEPHONE (OUTPATIENT)
Dept: FAMILY MEDICINE CLINIC | Age: 73
End: 2018-11-30

## 2018-12-03 NOTE — TELEPHONE ENCOUNTER
Patient states he is doing a lot better     But states he doesn't think he has been contacted about titration

## 2018-12-11 ENCOUNTER — HOSPITAL ENCOUNTER (OUTPATIENT)
Dept: SLEEP CENTER | Age: 73
Discharge: HOME | End: 2018-12-11
Payer: MEDICARE

## 2018-12-11 PROCEDURE — 95811 POLYSOM 6/>YRS CPAP 4/> PARM: CPT

## 2018-12-17 NOTE — PROGRESS NOTES
[place] [address]                               SLEEP STUDY REPORT    PATIENT NAME: Jannet Gunter                       :        1945  MED REC NO:   [id number]                              ROOM:  ACCOUNT NO:   [de-identified]                           ADMIT DATE: 2018  PROVIDER:     Bharathi Ponce    [facility]  INTERPRETATION OF CLINICAL POLYSOMNOGRAPHY    DATE OF STUDY:  2018    This is attended baseline comprehensive polysomnogram.    Sleep laboratory is [facility].    CLINICAL HISTORY:  This is a 51-year-old male, who is 69 inches tall,  weighs 187 pounds, has a body mass index of 27.6 and neck circumference  of 16 inches. The patient was referred to the sleep laboratory due to  daytime somnolence, loud snoring, difficulty falling and staying asleep,  hypertension, coronary artery disease, diabetes and waking with numbness  in his face. The patient's [place] Sleepiness Scale is 12. SLEEP STAGING:  Total duration of the study was 464.8 minutes. Total  sleep time was 308 minutes. This led to a sleep efficiency of 66.3%. The patient had sleep latency in 20 minutes. Stage N1 sleep accounted  for 8.6% of the study. Stage N2 sleep accounted for 56.1% of the study. Slow-wave sleep accounted for 17.5% of the study and REM sleep accounted  for 17.8% of the study. The latency to REM sleep was 147 minutes. COMPREHENSIVE VENTILATORY MONITORING:  The patient had 4 central apneas,  4 obstructive apneas, no mixed apneas and 31 hypopneas. This led to an  apnea-hypopnea index of 7.6 per hour. The average oxygenation while  awake was 98% with a minimum oxygen saturation down to 88%.     AROUSAL DATA:  The patient had 127 arousals leading to an arousal index  of 24.7 per hour, 52 arousals were associated with respiratory event, 23  arousals were associated

## 2018-12-19 ENCOUNTER — IMMUNIZATION (OUTPATIENT)
Dept: FAMILY MEDICINE CLINIC | Age: 73
End: 2018-12-19
Payer: MEDICARE

## 2018-12-19 VITALS — TEMPERATURE: 97.8 F

## 2018-12-19 DIAGNOSIS — Z23 NEED FOR INFLUENZA VACCINATION: Primary | ICD-10-CM

## 2018-12-19 PROCEDURE — G0008 ADMIN INFLUENZA VIRUS VAC: HCPCS | Performed by: NURSE PRACTITIONER

## 2018-12-19 PROCEDURE — 90686 IIV4 VACC NO PRSV 0.5 ML IM: CPT | Performed by: NURSE PRACTITIONER

## 2019-03-05 ENCOUNTER — OFFICE VISIT (OUTPATIENT)
Dept: FAMILY MEDICINE CLINIC | Age: 74
End: 2019-03-05
Payer: MEDICARE

## 2019-03-05 VITALS
DIASTOLIC BLOOD PRESSURE: 66 MMHG | BODY MASS INDEX: 28.88 KG/M2 | WEIGHT: 195 LBS | SYSTOLIC BLOOD PRESSURE: 124 MMHG | HEIGHT: 69 IN | OXYGEN SATURATION: 99 % | HEART RATE: 59 BPM

## 2019-03-05 DIAGNOSIS — E11.40 TYPE 2 DIABETES MELLITUS WITH DIABETIC NEUROPATHY, WITH LONG-TERM CURRENT USE OF INSULIN (HCC): ICD-10-CM

## 2019-03-05 DIAGNOSIS — I20.9 ANGINA, CLASS III (HCC): ICD-10-CM

## 2019-03-05 DIAGNOSIS — G47.33 OBSTRUCTIVE SLEEP APNEA SYNDROME: ICD-10-CM

## 2019-03-05 DIAGNOSIS — Z79.4 TYPE 2 DIABETES MELLITUS WITH DIABETIC NEUROPATHY, WITH LONG-TERM CURRENT USE OF INSULIN (HCC): ICD-10-CM

## 2019-03-05 DIAGNOSIS — E78.00 PURE HYPERCHOLESTEROLEMIA: Primary | ICD-10-CM

## 2019-03-05 PROCEDURE — G8598 ASA/ANTIPLAT THER USED: HCPCS | Performed by: FAMILY MEDICINE

## 2019-03-05 PROCEDURE — 4040F PNEUMOC VAC/ADMIN/RCVD: CPT | Performed by: FAMILY MEDICINE

## 2019-03-05 PROCEDURE — 1101F PT FALLS ASSESS-DOCD LE1/YR: CPT | Performed by: FAMILY MEDICINE

## 2019-03-05 PROCEDURE — G8417 CALC BMI ABV UP PARAM F/U: HCPCS | Performed by: FAMILY MEDICINE

## 2019-03-05 PROCEDURE — 99214 OFFICE O/P EST MOD 30 MIN: CPT | Performed by: FAMILY MEDICINE

## 2019-03-05 PROCEDURE — 1123F ACP DISCUSS/DSCN MKR DOCD: CPT | Performed by: FAMILY MEDICINE

## 2019-03-05 PROCEDURE — 2022F DILAT RTA XM EVC RTNOPTHY: CPT | Performed by: FAMILY MEDICINE

## 2019-03-05 PROCEDURE — G8427 DOCREV CUR MEDS BY ELIG CLIN: HCPCS | Performed by: FAMILY MEDICINE

## 2019-03-05 PROCEDURE — 3017F COLORECTAL CA SCREEN DOC REV: CPT | Performed by: FAMILY MEDICINE

## 2019-03-05 PROCEDURE — 1036F TOBACCO NON-USER: CPT | Performed by: FAMILY MEDICINE

## 2019-03-05 PROCEDURE — G8482 FLU IMMUNIZE ORDER/ADMIN: HCPCS | Performed by: FAMILY MEDICINE

## 2019-03-05 PROCEDURE — 3046F HEMOGLOBIN A1C LEVEL >9.0%: CPT | Performed by: FAMILY MEDICINE

## 2019-03-05 ASSESSMENT — ENCOUNTER SYMPTOMS
VOMITING: 0
BLURRED VISION: 0
COUGH: 0
ABDOMINAL PAIN: 0
TROUBLE SWALLOWING: 0
DIARRHEA: 0
EYE REDNESS: 0
EYE DISCHARGE: 0
BLOOD IN STOOL: 0
CONSTIPATION: 0
FACIAL SWELLING: 0
SHORTNESS OF BREATH: 0
NAUSEA: 0

## 2019-03-06 ENCOUNTER — TELEPHONE (OUTPATIENT)
Dept: FAMILY MEDICINE CLINIC | Age: 74
End: 2019-03-06

## 2019-03-08 RX ORDER — LISINOPRIL 5 MG/1
TABLET ORAL
Qty: 180 TABLET | Refills: 3 | Status: SHIPPED | OUTPATIENT
Start: 2019-03-08 | End: 2020-03-09

## 2019-04-09 ENCOUNTER — OFFICE VISIT (OUTPATIENT)
Dept: FAMILY MEDICINE CLINIC | Age: 74
End: 2019-04-09
Payer: MEDICARE

## 2019-04-09 VITALS
BODY MASS INDEX: 28.44 KG/M2 | TEMPERATURE: 97.8 F | HEART RATE: 65 BPM | SYSTOLIC BLOOD PRESSURE: 120 MMHG | DIASTOLIC BLOOD PRESSURE: 52 MMHG | OXYGEN SATURATION: 98 % | WEIGHT: 192.6 LBS

## 2019-04-09 DIAGNOSIS — R50.9 FEVER, UNSPECIFIED FEVER CAUSE: ICD-10-CM

## 2019-04-09 DIAGNOSIS — J06.9 VIRAL URI: ICD-10-CM

## 2019-04-09 DIAGNOSIS — R05.9 COUGH: Primary | ICD-10-CM

## 2019-04-09 LAB
INFLUENZA A ANTIBODY: NORMAL
INFLUENZA B ANTIBODY: NORMAL

## 2019-04-09 PROCEDURE — 1036F TOBACCO NON-USER: CPT | Performed by: NURSE PRACTITIONER

## 2019-04-09 PROCEDURE — G8598 ASA/ANTIPLAT THER USED: HCPCS | Performed by: NURSE PRACTITIONER

## 2019-04-09 PROCEDURE — 3017F COLORECTAL CA SCREEN DOC REV: CPT | Performed by: NURSE PRACTITIONER

## 2019-04-09 PROCEDURE — 87804 INFLUENZA ASSAY W/OPTIC: CPT | Performed by: NURSE PRACTITIONER

## 2019-04-09 PROCEDURE — 1123F ACP DISCUSS/DSCN MKR DOCD: CPT | Performed by: NURSE PRACTITIONER

## 2019-04-09 PROCEDURE — 99213 OFFICE O/P EST LOW 20 MIN: CPT | Performed by: NURSE PRACTITIONER

## 2019-04-09 PROCEDURE — G8427 DOCREV CUR MEDS BY ELIG CLIN: HCPCS | Performed by: NURSE PRACTITIONER

## 2019-04-09 PROCEDURE — G8417 CALC BMI ABV UP PARAM F/U: HCPCS | Performed by: NURSE PRACTITIONER

## 2019-04-09 PROCEDURE — 4040F PNEUMOC VAC/ADMIN/RCVD: CPT | Performed by: NURSE PRACTITIONER

## 2019-04-09 ASSESSMENT — ENCOUNTER SYMPTOMS
SINUS PRESSURE: 1
RHINORRHEA: 1
SORE THROAT: 0
SINUS PAIN: 1
COUGH: 1
SINUS COMPLAINT: 1

## 2019-04-09 ASSESSMENT — PATIENT HEALTH QUESTIONNAIRE - PHQ9
1. LITTLE INTEREST OR PLEASURE IN DOING THINGS: 0
SUM OF ALL RESPONSES TO PHQ QUESTIONS 1-9: 0
2. FEELING DOWN, DEPRESSED OR HOPELESS: 0
SUM OF ALL RESPONSES TO PHQ QUESTIONS 1-9: 0
SUM OF ALL RESPONSES TO PHQ9 QUESTIONS 1 & 2: 0

## 2019-04-09 NOTE — PROGRESS NOTES
[facility] PHYSICIANS  [facility] PRIMARY CARE OF [address] [address]  Dept: [phone]  Dept Fax: [phone]    Last encounter 2018    HPI:   Rex Miranda is a 69 y.o. male who presentstoday for his medical conditions/complaints as noted below. Rex Miranda is c/o of Sinus Problem (sinus pressure x 2 days)      Sinus Problem   Associated symptoms include chills, congestion, coughing and sinus pressure. Pertinent negatives include no headaches or sore throat. Started on  with congestion  Low grade fever. Poor sleep  Tired all day. \"went through 2 hankies and kleenex\"  Coughing   Denies influenza exposure.       Past Medical History:   Diagnosis Date    Allergic rhinitis     Allergist - Dr Miracle Portillo CAD (coronary artery disease)     Dr Enriqueta Lopez [facility])     Prostate Cancer (Removed prostate)-- Dr Tammy Dave Diabetic neuropathy [facility])     podiatry - Dr Fregoso Rujohann Hyperlipidemia     Hypertension     Type II or unspecified type diabetes mellitus without mention of complication, not stated as uncontrolled       Past Surgical History:   Procedure Laterality Date    CARDIAC CATHETERIZATION  2018    Lt Cardiac Cath Dr Lyla Good      COLONOSCOPY      CYST REMOVAL      LUNG BIOPSY  2007    PROSTATE BIOPSY      TOOTH EXTRACTION  2014    VASECTOMY         Family History   Problem Relation Age of Onset    Diabetes Mother     Dementia Mother     Heart Disease Father     Diabetes Sister     Diabetes Brother           Social History     Tobacco Use    Smoking status: Former Smoker     Packs/day: 1.00     Years: 20.00     Pack years: 20.00     Last attempt to quit: 1985     Years since quittin.2    Smokeless tobacco: Never Used   Substance Use Topics    Alcohol use: No      Current Outpatient Medications   Medication Sig Dispense Refill    lisinopril (PRINIVIL;ZESTRIL) 5 MG tablet TAKE 1 TABLET BY MOUTH TWICE DAILY 180 tablet 3    isosorbide mononitrate (IMDUR) 30 MG extended release tablet Take 0.5 tablets by mouth daily 30 tablet 1    mupirocin (BACTROBAN NASAL) 2 % nasal ointment Apply to right nasal septal ulcer x 3 days 1 Tube 0    insulin NPH (HUMULIN N) 100 UNIT/ML injection vial Inject 26 U at bedtime 1 vial 5    budesonide (RINOCORT AQUA) 32 MCG/ACT nasal spray use 1-2 sprays AT BEDTIME AS DIRECTED  1    docusate sodium (COLACE) 100 MG capsule Take 100 mg by mouth daily      insulin regular (HUMULIN R) 100 UNIT/ML injection Inject into the skin See Admin Instructions 26 U in the AM, 11 at lunch and 18 at supper      Multiple Vitamins-Minerals (THERAPEUTIC MULTIVITAMIN-MINERALS) tablet Take 1 tablet by mouth daily      atorvastatin (LIPITOR) 40 MG tablet Take 1 tablet by mouth daily 90 tablet 3    metoprolol succinate (TOPROL XL) 50 MG extended release tablet Take 1 tablet by mouth 2 times daily 180 tablet 3    Glucose Blood (BLOOD GLUCOSE TEST STRIPS) STRP Use to test blood sugar twice daily as needed 100 strip 3    metFORMIN (GLUCOPHAGE) 1000 MG tablet Take 1 tablet by mouth 2 times daily (with meals) 180 tablet 1    EPINEPHrine (EPIPEN) 0.3 MG/0.3ML SOAJ injection Inject 0.3 mg into the muscle      Lancets MISC Use to test blood sugars twice daily as needed 100 each 3    Calcium Carb-Cholecalciferol 600-500 MG-UNIT CAPS Take 1 capsule by mouth 2 times daily      Cholecalciferol (VITAMIN D3) 5000 units CAPS Take 1 capsule by mouth daily      Magnesium Oxide 500 MG TABS Take 1 tablet by mouth daily      aspirin 81 MG EC tablet Take 81 mg by mouth daily      mupirocin (BACTROBAN) 2 % ointment by Each Nare route nightly      NONFORMULARY Indications: Neuravite takes 1 daily B1, B6, B12, alpha lipoic acid in each capsule.       Blood Glucose Monitoring Suppl (BLOOD GLUCOSE METER) KIT Use to test blood sugar twice daily as needed 1 kit 0    leuprolide (LUPRON) 22.5 MG injection Inject 22.5 mg into the muscle. Every three months      montelukast (SINGULAIR) 10 MG tablet Take 10 mg by mouth nightly.  bicalutamide (CASODEX) 50 MG tablet Take 50 mg by mouth daily        No current facility-administered medications for this visit. Allergies   Allergen Reactions    Bee Venom Hives    Januvia [Sitagliptin Phosphate]      Numbness       Health Maintenance   Topic Date Due    Hepatitis C screen  1945    DTaP/Tdap/Td vaccine (1 - Tdap) 01/03/1966    Shingles Vaccine (1 of 2) 01/03/1997    Colon cancer screen colonoscopy  05/14/2018    Diabetic retinal exam  03/19/2019    Lipid screen  07/16/2019    Potassium monitoring  07/16/2019    Creatinine monitoring  07/16/2019    Diabetic foot exam  09/06/2019    A1C test (Diabetic or Prediabetic)  09/06/2019    Diabetic microalbuminuria test  09/06/2019    Flu vaccine  Completed    Pneumococcal 65+ years Vaccine  Completed    AAA screen  Completed       Subjective:      Review of Systems   Constitutional: Positive for activity change, appetite change, chills and fever. HENT: Positive for congestion, postnasal drip, rhinorrhea, sinus pressure and sinus pain. Negative for sore throat. Respiratory: Positive for cough. Musculoskeletal: Negative for myalgias. Neurological: Negative for headaches. Objective:     Physical Exam   Constitutional: He is oriented to person, place, and time. He appears well-developed and well-nourished. No distress. HENT:   Head: Normocephalic and atraumatic. Right Ear: External ear normal.   Left Ear: External ear normal.   Mouth/Throat: Oropharynx is clear and moist. No oropharyngeal exudate. Clear nasal drainage. Eyes: Pupils are equal, round, and reactive to light. Neck: Normal range of motion. Neck supple. Cardiovascular: Normal rate, regular rhythm, normal heart sounds and normal pulses. No murmur heard.   Pulmonary/Chest: Effort normal and breath sounds normal. No respiratory distress. Abdominal: Soft. He exhibits no mass. There is no tenderness. Musculoskeletal: Normal range of motion. Lymphadenopathy:     He has no cervical adenopathy. Neurological: He is alert and oriented to person, place, and time. Skin: No rash noted. Psychiatric: He has a normal mood and affect. His behavior is normal. Judgment and thought content normal.   Nursing note and vitals reviewed. BP (!) 120/52 (Site: Right Upper Arm, Position: Sitting, Cuff Size: Medium Adult)   Pulse 65   Temp 97.8 °F (36.6 °C) (Oral)   Wt 192 lb 9.6 oz (87.4 kg)   SpO2 98%   BMI 28.44 kg/m²     Data:     Lab Results   Component Value Date     07/16/2018    K 4.5 07/16/2018    CL 98 07/16/2018    CO2 24 07/16/2018    BUN 16 07/16/2018    CREATININE 0.84 07/16/2018    GLUCOSE 251 07/16/2018    PROT 7.2 07/16/2018    LABALBU 4.3 07/16/2018    BILITOT 0.84 07/16/2018    ALKPHOS 71 07/16/2018    AST 25 07/16/2018    ALT 25 07/16/2018     Lab Results   Component Value Date    WBC 6.9 11/27/2018    RBC 4.62 11/27/2018    HGB 14.8 11/27/2018    HCT 43.8 11/27/2018    MCV 94.8 11/27/2018    MCH 32.1 11/27/2018    MCHC 33.9 11/27/2018    RDW 12.9 11/27/2018     11/27/2018    MPV NOT REPORTED 11/27/2018     Lab Results   Component Value Date    TSH 2.09 07/16/2018     Lab Results   Component Value Date    CHOL 115 07/16/2018    CHOL 123 08/03/2017    HDL 33 07/16/2018    LABA1C 7.2 09/06/2018          Assessment & Plan       1. Cough  Neg influenza  - POCT Influenza A/B    2. Fever, unspecified fever cause    - POCT Influenza A/B    3. Viral URI  Good hydration    May use claritin or zyrtec. Completed Refills   Requested Prescriptions      No prescriptions requested or ordered in this encounter     Return if symptoms worsen or fail to improve. No orders of the defined types were placed in this encounter.     Orders Placed This Encounter   Procedures    POCT Influenza A/B Chava Kristopher received counseling on the following healthy behaviors: nutrition, exercise and medication adherence  Reviewed prior labs and health maintenance. Continue current medications, diet and exercise. Discussed use, benefit, and side effects of prescribed medications. Barriers to medication compliance addressed. Patient given educational materials - see patient instructions. All patient questions answered. Patient voiced understanding.           Electronically signed by EFFIE Gomez CNP on 4/9/2019 at 11:13 AM

## 2019-04-16 DIAGNOSIS — I10 ESSENTIAL HYPERTENSION, BENIGN: ICD-10-CM

## 2019-04-16 RX ORDER — ISOSORBIDE MONONITRATE 30 MG/1
15 TABLET, EXTENDED RELEASE ORAL DAILY
Qty: 45 TABLET | Refills: 3 | Status: SHIPPED | OUTPATIENT
Start: 2019-04-16 | End: 2019-07-15 | Stop reason: ALTCHOICE

## 2019-04-16 RX ORDER — METOPROLOL SUCCINATE 50 MG/1
50 TABLET, EXTENDED RELEASE ORAL 2 TIMES DAILY
Qty: 180 TABLET | Refills: 3 | Status: SHIPPED | OUTPATIENT
Start: 2019-04-16 | End: 2020-04-27

## 2019-05-05 DIAGNOSIS — E78.00 PURE HYPERCHOLESTEROLEMIA: ICD-10-CM

## 2019-05-06 RX ORDER — ATORVASTATIN CALCIUM 40 MG/1
TABLET, FILM COATED ORAL
Qty: 90 TABLET | Refills: 3 | Status: SHIPPED | OUTPATIENT
Start: 2019-05-06 | End: 2020-05-27

## 2019-07-15 ENCOUNTER — HOSPITAL ENCOUNTER (OUTPATIENT)
Age: 74
Discharge: HOME | End: 2019-07-17
Payer: MEDICARE

## 2019-07-15 ENCOUNTER — OFFICE VISIT (OUTPATIENT)
Dept: CARDIOLOGY CLINIC | Age: 74
End: 2019-07-15
Payer: MEDICARE

## 2019-07-15 ENCOUNTER — HOSPITAL ENCOUNTER (OUTPATIENT)
Age: 74
Discharge: HOME | End: 2019-07-15
Payer: MEDICARE

## 2019-07-15 ENCOUNTER — HOSPITAL ENCOUNTER (OUTPATIENT)
Dept: GENERAL RADIOLOGY | Age: 74
Discharge: HOME | End: 2019-07-17
Payer: MEDICARE

## 2019-07-15 VITALS
SYSTOLIC BLOOD PRESSURE: 140 MMHG | HEART RATE: 71 BPM | DIASTOLIC BLOOD PRESSURE: 60 MMHG | WEIGHT: 196 LBS | BODY MASS INDEX: 28.94 KG/M2 | OXYGEN SATURATION: 96 %

## 2019-07-15 DIAGNOSIS — I25.810 CORONARY ARTERY DISEASE INVOLVING AUTOLOGOUS VEIN CORONARY BYPASS GRAFT WITHOUT ANGINA PECTORIS: ICD-10-CM

## 2019-07-15 DIAGNOSIS — Z79.4 TYPE 2 DIABETES MELLITUS WITH DIABETIC NEUROPATHY, WITH LONG-TERM CURRENT USE OF INSULIN (HCC): ICD-10-CM

## 2019-07-15 DIAGNOSIS — E11.40 TYPE 2 DIABETES MELLITUS WITH DIABETIC NEUROPATHY, WITH LONG-TERM CURRENT USE OF INSULIN (HCC): ICD-10-CM

## 2019-07-15 DIAGNOSIS — I10 ESSENTIAL HYPERTENSION, BENIGN: ICD-10-CM

## 2019-07-15 DIAGNOSIS — E78.00 PURE HYPERCHOLESTEROLEMIA: ICD-10-CM

## 2019-07-15 DIAGNOSIS — E55.9 VITAMIN D DEFICIENCY DISEASE: ICD-10-CM

## 2019-07-15 DIAGNOSIS — E78.00 PURE HYPERCHOLESTEROLEMIA: Primary | ICD-10-CM

## 2019-07-15 LAB
ABSOLUTE EOS #: 0.3 K/UL (ref 0–0.4)
ABSOLUTE IMMATURE GRANULOCYTE: ABNORMAL K/UL (ref 0–0.3)
ABSOLUTE LYMPH #: 1.7 K/UL (ref 1–4.8)
ABSOLUTE MONO #: 0.4 K/UL (ref 0–1)
ALBUMIN SERPL-MCNC: 4.1 G/DL (ref 3.5–5.2)
ALBUMIN/GLOBULIN RATIO: ABNORMAL (ref 1–2.5)
ALP BLD-CCNC: 65 U/L (ref 40–129)
ALT SERPL-CCNC: 15 U/L (ref 5–41)
ANION GAP SERPL CALCULATED.3IONS-SCNC: 16 MMOL/L (ref 9–17)
AST SERPL-CCNC: 17 U/L
BASOPHILS # BLD: 1 % (ref 0–2)
BASOPHILS ABSOLUTE: 0 K/UL (ref 0–0.2)
BILIRUB SERPL-MCNC: 0.48 MG/DL (ref 0.3–1.2)
BUN BLDV-MCNC: 19 MG/DL (ref 8–23)
BUN/CREAT BLD: 21 (ref 9–20)
CALCIUM SERPL-MCNC: 9.7 MG/DL (ref 8.6–10.4)
CHLORIDE BLD-SCNC: 101 MMOL/L (ref 98–107)
CHOLESTEROL/HDL RATIO: 3.3
CHOLESTEROL: 111 MG/DL
CO2: 24 MMOL/L (ref 20–31)
CREAT SERPL-MCNC: 0.91 MG/DL (ref 0.7–1.2)
DIFFERENTIAL TYPE: YES
EOSINOPHILS RELATIVE PERCENT: 5 % (ref 0–5)
ESTIMATED AVERAGE GLUCOSE: 174 MG/DL
GFR AFRICAN AMERICAN: >60 ML/MIN
GFR NON-AFRICAN AMERICAN: >60 ML/MIN
GFR SERPL CREATININE-BSD FRML MDRD: ABNORMAL ML/MIN/{1.73_M2}
GFR SERPL CREATININE-BSD FRML MDRD: ABNORMAL ML/MIN/{1.73_M2}
GLUCOSE BLD-MCNC: 237 MG/DL (ref 70–99)
HBA1C MFR BLD: 7.7 % (ref 4.8–5.9)
HCT VFR BLD CALC: 37.2 % (ref 41–53)
HDLC SERPL-MCNC: 34 MG/DL
HEMOGLOBIN: 12.9 G/DL (ref 13.5–17.5)
IMMATURE GRANULOCYTES: ABNORMAL %
LDL CHOLESTEROL: 28 MG/DL (ref 0–130)
LYMPHOCYTES # BLD: 32 % (ref 13–44)
MAGNESIUM: 2 MG/DL (ref 1.6–2.6)
MCH RBC QN AUTO: 32.8 PG (ref 26–34)
MCHC RBC AUTO-ENTMCNC: 34.8 G/DL (ref 31–37)
MCV RBC AUTO: 94.1 FL (ref 80–100)
MONOCYTES # BLD: 8 % (ref 5–9)
NRBC AUTOMATED: ABNORMAL PER 100 WBC
PATIENT FASTING?: YES
PDW BLD-RTO: 13.4 % (ref 12.1–15.2)
PLATELET # BLD: 202 K/UL (ref 140–450)
PLATELET ESTIMATE: ABNORMAL
PMV BLD AUTO: ABNORMAL FL (ref 6–12)
POTASSIUM SERPL-SCNC: 4.3 MMOL/L (ref 3.7–5.3)
RBC # BLD: 3.96 M/UL (ref 4.5–5.9)
RBC # BLD: ABNORMAL 10*6/UL
SEG NEUTROPHILS: 54 % (ref 39–75)
SEGMENTED NEUTROPHILS ABSOLUTE COUNT: 2.8 K/UL (ref 2.1–6.5)
SODIUM BLD-SCNC: 141 MMOL/L (ref 135–144)
TOTAL PROTEIN: 7 G/DL (ref 6.4–8.3)
TRIGL SERPL-MCNC: 243 MG/DL
TSH SERPL DL<=0.05 MIU/L-ACNC: 2.56 MIU/L (ref 0.3–5)
VITAMIN D 25-HYDROXY: 58.2 NG/ML (ref 30–100)
VLDLC SERPL CALC-MCNC: ABNORMAL MG/DL (ref 1–30)
WBC # BLD: 5.3 K/UL (ref 3.5–11)
WBC # BLD: ABNORMAL 10*3/UL

## 2019-07-15 PROCEDURE — 83036 HEMOGLOBIN GLYCOSYLATED A1C: CPT

## 2019-07-15 PROCEDURE — 4040F PNEUMOC VAC/ADMIN/RCVD: CPT | Performed by: INTERNAL MEDICINE

## 2019-07-15 PROCEDURE — 93005 ELECTROCARDIOGRAM TRACING: CPT

## 2019-07-15 PROCEDURE — 36415 COLL VENOUS BLD VENIPUNCTURE: CPT

## 2019-07-15 PROCEDURE — 3017F COLORECTAL CA SCREEN DOC REV: CPT | Performed by: INTERNAL MEDICINE

## 2019-07-15 PROCEDURE — 1036F TOBACCO NON-USER: CPT | Performed by: INTERNAL MEDICINE

## 2019-07-15 PROCEDURE — 1123F ACP DISCUSS/DSCN MKR DOCD: CPT | Performed by: INTERNAL MEDICINE

## 2019-07-15 PROCEDURE — 80053 COMPREHEN METABOLIC PANEL: CPT

## 2019-07-15 PROCEDURE — 82306 VITAMIN D 25 HYDROXY: CPT

## 2019-07-15 PROCEDURE — 84443 ASSAY THYROID STIM HORMONE: CPT

## 2019-07-15 PROCEDURE — G8598 ASA/ANTIPLAT THER USED: HCPCS | Performed by: INTERNAL MEDICINE

## 2019-07-15 PROCEDURE — 99214 OFFICE O/P EST MOD 30 MIN: CPT | Performed by: INTERNAL MEDICINE

## 2019-07-15 PROCEDURE — 71046 X-RAY EXAM CHEST 2 VIEWS: CPT

## 2019-07-15 PROCEDURE — 2022F DILAT RTA XM EVC RTNOPTHY: CPT | Performed by: INTERNAL MEDICINE

## 2019-07-15 PROCEDURE — 85025 COMPLETE CBC W/AUTO DIFF WBC: CPT

## 2019-07-15 PROCEDURE — 83735 ASSAY OF MAGNESIUM: CPT

## 2019-07-15 PROCEDURE — G8427 DOCREV CUR MEDS BY ELIG CLIN: HCPCS | Performed by: INTERNAL MEDICINE

## 2019-07-15 PROCEDURE — 3045F PR MOST RECENT HEMOGLOBIN A1C LEVEL 7.0-9.0%: CPT | Performed by: INTERNAL MEDICINE

## 2019-07-15 PROCEDURE — 80061 LIPID PANEL: CPT

## 2019-07-15 PROCEDURE — G8417 CALC BMI ABV UP PARAM F/U: HCPCS | Performed by: INTERNAL MEDICINE

## 2019-07-15 RX ORDER — FLUTICASONE PROPIONATE 50 MCG
1 SPRAY, SUSPENSION (ML) NASAL DAILY
COMMUNITY
End: 2020-07-13

## 2019-07-15 NOTE — LETTER
to a CPAP mask. He has had no hospitalizations or procedures since I saw him 1 year ago. CARDIAC RISK FACTORS:  Hypertension:  Positive. Hyperlipidemia:  Positive. Other Family Members:  Positive. Diabetes:  Positive. Peripheral Vascular Disease:  Negative. Smoking:  Negative. Bypass Surgery:  Positive. MEDICATIONS AT HOME:  He is currently on aspirin 81 mg daily; Lipitor 40 mg daily; Casodex 50 mg daily; vitamin D 5000 units daily; Flonase daily; NPH insulin 26 units at bedtime with regular insulin at 26 units in the morning, 11 at lunch and 18 at supper; Lupron 22.5 mg every 3 months; Prinivil 5 mg b.i.d.; Claritin p.r.n.; magnesium 500 mg daily; Glucophage 1000 mg b.i.d.; Toprol-XL 50 mg b.i.d.; Singulair 10 mg daily; NeuraVite 1 tablet daily for neuropathy; Imdur 30 mg half a tablet daily. PAST MEDICAL HISTORY:  1.  Prostate carcinoma diagnosed in  with robotic prostatectomy in 2006, with no reoccurrence. 2.  Pulmonary nodules discovered in , with left upper lobectomy positive for metastatic adenocarcinoma done by Dr. Miracle Cline, with his cancer being followed at [facility] in [place]. 3.  Insulin-dependent diabetes, with a hemoglobin A1c of 7.7 at this time. 4.  Severe arthritis. 5.  Hypertension. 6.  Vasectomy. 7.  Neuropathy. 8.  Asthma. 9.  Hyperlipidemia, well controlled. 10.  Cardiac as described above. FAMILY HISTORY:  Father  of CHF after an MI. Mother  of bypass surgery. One sister had a stent. One brother is healthy. SOCIAL HISTORY:  He is 69years old,  to Leticia Delaney. Has 3 children. Quit smoking 31 years ago. 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 Does not drink alcohol. Does not smoke. He has a [employer] tricycle, which he enjoys riding. His daughter is a nurse at [employer] in [place].      REVIEW OF SYSTEMS:  Cardiac as above.   Other systems reviewed including constitutional, eyes, ears, nose and throat, cardiovascular, respiratory,

## 2019-07-16 LAB
EKG ATRIAL RATE: 62 BPM
EKG P AXIS: 21 DEGREES
EKG P-R INTERVAL: 148 MS
EKG Q-T INTERVAL: 454 MS
EKG QRS DURATION: 86 MS
EKG QTC CALCULATION (BAZETT): 460 MS
EKG R AXIS: 3 DEGREES
EKG T AXIS: 53 DEGREES
EKG VENTRICULAR RATE: 62 BPM

## 2019-07-16 PROCEDURE — 93010 ELECTROCARDIOGRAM REPORT: CPT | Performed by: INTERNAL MEDICINE

## 2019-07-23 NOTE — PROGRESS NOTES
echocardiogram showed normal LV function, EF in the 50% to 55% range. IMPRESSION:  1. Severe coronary artery disease. 2.  Angina, developing in 12/2017, consistent with chest heaviness and marked shortness of breath with activity. 3.  Abnormal stress test, with subsequent catheterization on 01/17/2018, showing 90% left main trunk, 90% LAD, 95% circumflex and 70% RCA with an EF of 60%. 4.  Open heart surgery on 01/18/2018, by Dr. Brittaney Fernandez, with a LIMA to the LAD, vein graft to the OM and a vein graft to the acute marginal branch of the right coronary artery with a good end result. 5.  Normal LV function. 6.  Hypertension, well controlled. 7.  Hyperlipidemia, well controlled. 8.  Insulin-dependent diabetes, followed by Dr. Gracy Dueñas with a hemoglobin A1c of 7.7.  9.  Prostate carcinoma with a prostatectomy in 06/2006. 10.  Left upper lobectomy because of metastatic adenocarcinoma in 2007 with no reoccurrence of disease. 11.  Peripheral neuropathy from his diabetes. PLAN:  1. Can stop his Imdur. 2.  Encouraged him to restart his exercise program.  3.  We will see him in 1 year. DISCUSSION:  Mr. Verónica Vincent overall is doing very well. He has had none of his anginal-type symptoms. He had atypical discomfort with numbness in his jaw, but this was not similar to his previous angina, I do not think it represented angina. He can stop his Imdur from my standpoint. Otherwise, his risk factors are nicely modified. I will see him in 1 year in followup. I would be glad to see him earlier if deemed necessary, but at this point he appears to be doing well. Thank you very much for allowing me the privilege of seeing Mr. Verónica Vincent. If you have any questions on my thoughts, please do not hesitate to contact me.      Sincerely,        Alexsander Ji    D: 07/15/2019 10:00:52     T: 07/15/2019 10:04:27     [email or web address]  Job#: [id number]   Doc#: [id number]

## 2019-08-22 ENCOUNTER — OFFICE VISIT (OUTPATIENT)
Dept: FAMILY MEDICINE CLINIC | Age: 74
End: 2019-08-22
Payer: MEDICARE

## 2019-08-22 VITALS
OXYGEN SATURATION: 98 % | DIASTOLIC BLOOD PRESSURE: 60 MMHG | WEIGHT: 192 LBS | HEIGHT: 70 IN | HEART RATE: 70 BPM | SYSTOLIC BLOOD PRESSURE: 130 MMHG | BODY MASS INDEX: 27.49 KG/M2

## 2019-08-22 DIAGNOSIS — Z86.010 HISTORY OF COLONIC POLYPS: ICD-10-CM

## 2019-08-22 DIAGNOSIS — Z00.00 ENCOUNTER FOR MEDICARE ANNUAL WELLNESS EXAM: Primary | ICD-10-CM

## 2019-08-22 DIAGNOSIS — K58.0 IRRITABLE BOWEL SYNDROME WITH DIARRHEA: ICD-10-CM

## 2019-08-22 PROCEDURE — 4040F PNEUMOC VAC/ADMIN/RCVD: CPT | Performed by: FAMILY MEDICINE

## 2019-08-22 PROCEDURE — 3017F COLORECTAL CA SCREEN DOC REV: CPT | Performed by: FAMILY MEDICINE

## 2019-08-22 PROCEDURE — 1123F ACP DISCUSS/DSCN MKR DOCD: CPT | Performed by: FAMILY MEDICINE

## 2019-08-22 PROCEDURE — G8598 ASA/ANTIPLAT THER USED: HCPCS | Performed by: FAMILY MEDICINE

## 2019-08-22 PROCEDURE — G0438 PPPS, INITIAL VISIT: HCPCS | Performed by: FAMILY MEDICINE

## 2019-08-22 ASSESSMENT — PATIENT HEALTH QUESTIONNAIRE - PHQ9
SUM OF ALL RESPONSES TO PHQ QUESTIONS 1-9: 0
SUM OF ALL RESPONSES TO PHQ QUESTIONS 1-9: 0

## 2019-08-22 ASSESSMENT — LIFESTYLE VARIABLES: HOW OFTEN DO YOU HAVE A DRINK CONTAINING ALCOHOL: 0

## 2019-08-22 NOTE — PROGRESS NOTES
Socioeconomic History    Marital status:      Spouse name: Not on file    Number of children: Not on file    Years of education: Not on file    Highest education level: Not on file   Occupational History    Not on file   Social Needs    Financial resource strain: Not on file    Food insecurity:     Worry: Not on file     Inability: Not on file    Transportation needs:     Medical: Not on file     Non-medical: Not on file   Tobacco Use    Smoking status: Former Smoker     Packs/day: 1.00     Years: 20.00     Pack years: 20.00     Last attempt to quit: 1985     Years since quittin.6    Smokeless tobacco: Never Used   Substance and Sexual Activity    Alcohol use: No    Drug use: No    Sexual activity: Not on file   Lifestyle    Physical activity:     Days per week: Not on file     Minutes per session: Not on file    Stress: Not on file   Relationships    Social connections:     Talks on phone: Not on file     Gets together: Not on file     Attends [employer] service: Not on file     Active member of club or organization: Not on file     Attends meetings of clubs or organizations: Not on file     Relationship status: Not on file    Intimate partner violence:     Fear of current or ex partner: Not on file     Emotionally abused: Not on file     Physically abused: Not on file     Forced sexual activity: Not on file   Other Topics Concern    Not on file   Social History Narrative    Not on file       Consultants:   Patient Care Team:  Sanjuanita Rush MD as PCP - Yesenia Lopez MD as PCP - [facility] Empaneled Provider  Jaclyn Begum MD as Consulting Physician (Hematology and Oncology)  Juve Zavala MD as Consulting Physician (Cardiology)  Tracy Gutierrez MD as Consulting Physician (General Surgery)    Wt Readings from Last 3 Encounters:   19 192 lb (87.1 kg)   07/15/19 196 lb (88.9 kg)   19 192 lb 9.6 oz (87.4 kg)     BP Readings from Last 3 Encounters:   19 130/60   07/15/19 (!) 140/60   04/09/19 (!) 120/52       Pertinent Physical Exam    Vitals:    08/22/19 0830   BP: 130/60   Pulse: 70   SpO2: 98%   Weight: 192 lb (87.1 kg)   Height: 5' 10\" (1.778 m)     Body mass index is 27.55 kg/m². ROS (information related to health maintenance and screening)  Skin - no rash, no wound  Head - no HA, no dzziness  Eyes - no vision changes, no eye pain or redness  Ears - no pain, wears hearing aids  Neck - no pain or stiffness  Pulmonary - no SOB, no cough  Cardio - no chest pain, no palpitations  GI - more loose and explosive stool (pt states certain foods trigger the bowels), no abdominal pain.  No N/V.   - no pain or blood in the urine  Musculoskeletal - no joint pain or swelling    Physical Exam (Minimal exam needed for wellness visit)  General Appearance: alert and oriented to person, place and time, well-developed and well-nourished, in no acute distress  Skin: warm and dry, no rash or erythema  Head: normocephalic and atraumatic  Eyes: pupils equal, conjunctivae normal  ENT: tympanic membrane, external ear and ear canal normal bilaterally, oropharynx clear and moist with normal mucous membranes  Neck: neck supple and non tender without mass, no thyromegaly, no cervical lymphadenopathy   Pulmonary/Chest: clear to auscultation bilaterally- no wheezes, rales or rhonchi, normal air movement  Cardiovascular: normal rate, regular rhythm, no murmurs and no carotid bruits  Abdomen: soft, non-tender, non-distended, normal bowels sounds, and no masses  Extremities: no cyanosis, clubbing or edema  Musculoskeletal: normal range of motion, no joint swelling, deformity or tenderness  Neurologic: no cranial nerve deficit, gait and coordination normal and speech normal    Current Health Maintenance Status  Health Maintenance   Topic Date Due    Hepatitis C screen  1945    DTaP/Tdap/Td vaccine (1 - Tdap) 01/03/1966    Shingles Vaccine (1 of 2) 01/03/1997    Annual Wellness Visit (AWV)  01/03/2010    Colon cancer screen colonoscopy  05/14/2018    Flu vaccine (1) 09/01/2019    Diabetic foot exam  09/06/2019    Diabetic microalbuminuria test  09/06/2019    Diabetic retinal exam  06/18/2020    A1C test (Diabetic or Prediabetic)  07/15/2020    Lipid screen  07/15/2020    Potassium monitoring  07/15/2020    Creatinine monitoring  07/15/2020    Pneumococcal 65+ years Vaccine  Completed    AAA screen  Completed         Medicare AWV Workflow and Risk Assessment    Review Medicare Health Risk Assessment form completed by patient  Document vitals, height included (3 vital signs minimum)      Update Allergies and Medications    complete  Update Family and Social History (HRA #2-6)    complete  Update Health Maintenance (HRA #7-13)    Incomplete--due for colonoscopy  Update Vaccines in Historical Immunizations (HRA #9-10)    Pt may look into having shingles and Dtap  Update Patient Care Team- in [place] ([place] #14)     complete  Has patient seen an eye specialist within the past 2 years (HRA #7)? yes     Has patient seen a dentist within the past year (HRA #11)? No but pt wears DENTURES    Medical Suppliers Used (diabetic supplies, 02, medical equipment, etc.) (HRA #14):  Diabetic supplies    End of Life Planning (HRA # 15): Advanced Directive:  yes,   copy requested      Health Risk Assessment   Health Risk Assessment form completed by patient, reviewed and scanned into chart. HRA interpretation guide- enter risks identified through screenings into table below    1. Behavioral Risks:    Tobacco:  If patient responded \"yes\" to HRA question #6, screen is positive. Result - negative  Alcohol use: Add up scores of alcohol use questions (HRA # 3-5)- positive result is 3 or above for women and 4 or above for men. Result - negative  Physical activity:  If patient responded \"no\" to HRA question #16, screen is positive.    Result - negative, pt tries to walk 10,000 steps

## 2019-11-04 ENCOUNTER — IMMUNIZATION (OUTPATIENT)
Dept: FAMILY MEDICINE CLINIC | Age: 74
End: 2019-11-04
Payer: MEDICARE

## 2019-11-04 DIAGNOSIS — Z23 NEED FOR INFLUENZA VACCINATION: Primary | ICD-10-CM

## 2019-11-04 PROCEDURE — G0008 ADMIN INFLUENZA VIRUS VAC: HCPCS | Performed by: FAMILY MEDICINE

## 2019-11-04 PROCEDURE — 90686 IIV4 VACC NO PRSV 0.5 ML IM: CPT | Performed by: FAMILY MEDICINE

## 2020-03-09 RX ORDER — LISINOPRIL 5 MG/1
TABLET ORAL
Qty: 180 TABLET | Refills: 3 | Status: SHIPPED | OUTPATIENT
Start: 2020-03-09 | End: 2021-03-09

## 2020-04-27 RX ORDER — METOPROLOL SUCCINATE 50 MG/1
TABLET, EXTENDED RELEASE ORAL
Qty: 180 TABLET | Refills: 3 | Status: SHIPPED
Start: 2020-04-27 | End: 2020-07-13 | Stop reason: SDUPTHER

## 2020-05-27 RX ORDER — ATORVASTATIN CALCIUM 40 MG/1
TABLET, FILM COATED ORAL
Qty: 90 TABLET | Refills: 3 | Status: SHIPPED | OUTPATIENT
Start: 2020-05-27 | End: 2021-04-26

## 2020-07-13 ENCOUNTER — HOSPITAL ENCOUNTER (OUTPATIENT)
Age: 75
Discharge: HOME | End: 2020-07-13
Payer: MEDICARE

## 2020-07-13 ENCOUNTER — HOSPITAL ENCOUNTER (OUTPATIENT)
Age: 75
Discharge: HOME | End: 2020-07-15
Payer: MEDICARE

## 2020-07-13 ENCOUNTER — HOSPITAL ENCOUNTER (OUTPATIENT)
Dept: GENERAL RADIOLOGY | Age: 75
Discharge: HOME | End: 2020-07-15
Payer: MEDICARE

## 2020-07-13 ENCOUNTER — OFFICE VISIT (OUTPATIENT)
Dept: CARDIOLOGY CLINIC | Age: 75
End: 2020-07-13
Payer: MEDICARE

## 2020-07-13 LAB
ABSOLUTE EOS #: 0.2 K/UL (ref 0–0.4)
ABSOLUTE IMMATURE GRANULOCYTE: ABNORMAL K/UL (ref 0–0.3)
ABSOLUTE LYMPH #: 1.6 K/UL (ref 1–4.8)
ABSOLUTE MONO #: 0.5 K/UL (ref 0–1)
ALBUMIN SERPL-MCNC: 4.7 G/DL (ref 3.5–5.2)
ALBUMIN/GLOBULIN RATIO: ABNORMAL (ref 1–2.5)
ALP BLD-CCNC: 65 U/L (ref 40–129)
ALT SERPL-CCNC: 20 U/L (ref 5–41)
ANION GAP SERPL CALCULATED.3IONS-SCNC: 16 MMOL/L (ref 9–17)
AST SERPL-CCNC: 20 U/L
BASOPHILS # BLD: 1 % (ref 0–2)
BASOPHILS ABSOLUTE: 0 K/UL (ref 0–0.2)
BILIRUB SERPL-MCNC: 0.6 MG/DL (ref 0.3–1.2)
BUN BLDV-MCNC: 14 MG/DL (ref 8–23)
BUN/CREAT BLD: 13 (ref 9–20)
CALCIUM SERPL-MCNC: 10.3 MG/DL (ref 8.6–10.4)
CHLORIDE BLD-SCNC: 98 MMOL/L (ref 98–107)
CHOLESTEROL/HDL RATIO: 3.8
CHOLESTEROL: 114 MG/DL
CO2: 24 MMOL/L (ref 20–31)
CREAT SERPL-MCNC: 1.04 MG/DL (ref 0.7–1.2)
DIFFERENTIAL TYPE: YES
EOSINOPHILS RELATIVE PERCENT: 4 % (ref 0–5)
GFR AFRICAN AMERICAN: >60 ML/MIN
GFR NON-AFRICAN AMERICAN: >60 ML/MIN
GFR SERPL CREATININE-BSD FRML MDRD: ABNORMAL ML/MIN/{1.73_M2}
GFR SERPL CREATININE-BSD FRML MDRD: ABNORMAL ML/MIN/{1.73_M2}
GLUCOSE BLD-MCNC: 290 MG/DL (ref 70–99)
HCT VFR BLD CALC: 37.7 % (ref 41–53)
HDLC SERPL-MCNC: 30 MG/DL
HEMOGLOBIN: 13.1 G/DL (ref 13.5–17.5)
IMMATURE GRANULOCYTES: ABNORMAL %
LDL CHOLESTEROL: 26 MG/DL (ref 0–130)
LYMPHOCYTES # BLD: 30 % (ref 13–44)
MAGNESIUM: 1.9 MG/DL (ref 1.6–2.6)
MCH RBC QN AUTO: 32.6 PG (ref 26–34)
MCHC RBC AUTO-ENTMCNC: 34.9 G/DL (ref 31–37)
MCV RBC AUTO: 93.3 FL (ref 80–100)
MONOCYTES # BLD: 9 % (ref 5–9)
NRBC AUTOMATED: ABNORMAL PER 100 WBC
PATIENT FASTING?: NO
PDW BLD-RTO: 13.3 % (ref 12.1–15.2)
PLATELET # BLD: 214 K/UL (ref 140–450)
PLATELET ESTIMATE: ABNORMAL
PMV BLD AUTO: ABNORMAL FL (ref 6–12)
POTASSIUM SERPL-SCNC: 4.3 MMOL/L (ref 3.7–5.3)
RBC # BLD: 4.04 M/UL (ref 4.5–5.9)
RBC # BLD: ABNORMAL 10*6/UL
SEG NEUTROPHILS: 56 % (ref 39–75)
SEGMENTED NEUTROPHILS ABSOLUTE COUNT: 2.9 K/UL (ref 2.1–6.5)
SODIUM BLD-SCNC: 138 MMOL/L (ref 135–144)
TOTAL PROTEIN: 7.3 G/DL (ref 6.4–8.3)
TRIGL SERPL-MCNC: 292 MG/DL
TSH SERPL DL<=0.05 MIU/L-ACNC: 1.79 MIU/L (ref 0.3–5)
VITAMIN D 25-HYDROXY: 80.9 NG/ML (ref 30–100)
VLDLC SERPL CALC-MCNC: ABNORMAL MG/DL (ref 1–30)
WBC # BLD: 5.2 K/UL (ref 3.5–11)
WBC # BLD: ABNORMAL 10*3/UL

## 2020-07-13 PROCEDURE — 93005 ELECTROCARDIOGRAM TRACING: CPT

## 2020-07-13 PROCEDURE — 71046 X-RAY EXAM CHEST 2 VIEWS: CPT

## 2020-07-13 PROCEDURE — 99214 OFFICE O/P EST MOD 30 MIN: CPT | Performed by: INTERNAL MEDICINE

## 2020-07-13 PROCEDURE — 3017F COLORECTAL CA SCREEN DOC REV: CPT | Performed by: INTERNAL MEDICINE

## 2020-07-13 PROCEDURE — 36415 COLL VENOUS BLD VENIPUNCTURE: CPT

## 2020-07-13 PROCEDURE — G8427 DOCREV CUR MEDS BY ELIG CLIN: HCPCS | Performed by: INTERNAL MEDICINE

## 2020-07-13 PROCEDURE — 3051F HG A1C>EQUAL 7.0%<8.0%: CPT | Performed by: INTERNAL MEDICINE

## 2020-07-13 PROCEDURE — 84443 ASSAY THYROID STIM HORMONE: CPT

## 2020-07-13 PROCEDURE — 1123F ACP DISCUSS/DSCN MKR DOCD: CPT | Performed by: INTERNAL MEDICINE

## 2020-07-13 PROCEDURE — G8417 CALC BMI ABV UP PARAM F/U: HCPCS | Performed by: INTERNAL MEDICINE

## 2020-07-13 PROCEDURE — 1036F TOBACCO NON-USER: CPT | Performed by: INTERNAL MEDICINE

## 2020-07-13 PROCEDURE — 80061 LIPID PANEL: CPT

## 2020-07-13 PROCEDURE — 85025 COMPLETE CBC W/AUTO DIFF WBC: CPT

## 2020-07-13 PROCEDURE — 4040F PNEUMOC VAC/ADMIN/RCVD: CPT | Performed by: INTERNAL MEDICINE

## 2020-07-13 PROCEDURE — 83036 HEMOGLOBIN GLYCOSYLATED A1C: CPT

## 2020-07-13 PROCEDURE — 83735 ASSAY OF MAGNESIUM: CPT

## 2020-07-13 PROCEDURE — 80053 COMPREHEN METABOLIC PANEL: CPT

## 2020-07-13 PROCEDURE — 82306 VITAMIN D 25 HYDROXY: CPT

## 2020-07-13 PROCEDURE — 2022F DILAT RTA XM EVC RTNOPTHY: CPT | Performed by: INTERNAL MEDICINE

## 2020-07-13 RX ORDER — METOPROLOL SUCCINATE 50 MG/1
50 TABLET, EXTENDED RELEASE ORAL DAILY
COMMUNITY
End: 2021-05-17

## 2020-07-13 RX ORDER — ERGOCALCIFEROL (VITAMIN D2) 1250 MCG
50000 CAPSULE ORAL WEEKLY
COMMUNITY
End: 2022-08-02

## 2020-07-13 NOTE — LETTER
Nuzhat Sierra M.D. [address] [place] [address]  [phone]        2020        Kateryna José MD  [address] [address]    RE:   Tony Brooke  :  1945    Dear Dr. Yumiko Peoples:    CHIEF COMPLAINT:  1. Coronary artery disease. 2.  Chest pain, noncardiac. 3.  Hypertension. HISTORY OF PRESENT ILLNESS:  I had the pleasure of seeing Mr. Richard Maradiaga in our office on 2020. He is a pleasant 53-year-old gentleman who developed increasing shortness of breath in 2017 along with chest tightness. He had never had a cardiac catheterization. I did a Lexiscan stress test.  He developed ST depression in aVL, V5 and V6 with a diffusion defect inferolaterally. His stress test was done on 2017. I proceeded on with a cardiac catheterization on 2018. This showed 90% left main trunk, 90% LAD, 95% circumflex, 70% RCA, with an EF of 60%. He had subsequent coronary artery bypass surgery on 2018, with a LIMA to the LAD, a vein graft to the OM, and a vein graft to the acute marginal branch of the right coronary artery. He has done well over this last year. He has had no chest pain or chest discomfort. He walks approximately 6000 steps a day. He has occasional sharp discomfort, not related to activity, which is clearly noncardiac. Energy level has been good and he has been working outside without difficulty. His diabetes continues to be work in process. CARDIAC RISK FACTORS:  Hypertension:  Positive. Hyperlipidemia:  Positive. Other Family Members:  Positive. Diabetes:  Positive. Peripheral Vascular Disease:  Negative. Smoking:  Negative. Bypass Surgery:  Positive. MEDICATIONS:  He is on aspirin 81 mg daily;  Lipitor 40 mg daily; vitamin D 50,000 units weekly; NPH 33 units at bedtime; regular insulin 28 in the morning, 12 at lunch, 20 at supper; lisinopril 5 mg b.i.d.; magnesium 500 mg daily; Glucophage 1000 mg b.i.d.; Toprol-XL 50 mg daily; Singulair 10 mg daily. PAST MEDICAL HISTORY:  1.  Prostate carcinoma diagnosed in , with a robotic prostatectomy in 2006, no reoccurrence. 2.  Pulmonary nodules in  with left upper lobectomy, positive for metastatic adenocarcinoma, done at [facility], with his cancer followed at [facility] in [place]. 3.  Insulin-dependent diabetes. 4.  Severe arthritis. 5.  Hypertension. 6.  Vasectomy. 7.  Neuropathy. 8.  Hyperlipidemia, well controlled. 9.  Cardiac as described above. FAMILY HISTORY:  Father  of CHF, of an MI. Mother  of bypass surgery. Sister had a stent. One brother is healthy. SOCIAL HISTORY:  He is 70years old,  to Jonna Tong. He has 3 children. Quit smoking 32 years ago. Retired from [employer] in [employer] [employer]. Does not smoke, does not drink alcohol. Has a [employer] tricycle. His daughter is a nurse in [place]. REVIEW OF SYSTEMS:  Cardiac as above. Other systems reviewed including constitutional, eyes, ears, nose and throat, cardiovascular, respiratory, GI, , musculoskeletal, integumentary, neurologic, endocrine, hematologic and allergic/immunologic are negative except for what is described above. No weight loss or weight gain. No change in bowel habits. No blood in stool. No fevers, sweats or chills. PHYSICAL EXAMINATION:  VITAL SIGNS:  His blood pressure was 140/70, with a heart rate of 70 and regular. Respiratory rate 18. GENERAL:  He is a pleasant 77-year-old gentleman. Denied pain. He was oriented to person, place and time. Answered questions appropriately. SKIN:  No unusual skin changes. HEENT:  The pupils are equally round and intact. Mucous membranes were dry. NECK:  No JVD. Good carotid pulses. No carotid bruits. No lymphadenopathy or thyromegaly. CARDIOVASCULAR EXAM:  S1 and S2 were normal.  No S3 or S4. Soft systolic blowing type murmur. No diastolic murmur. PMI was normal.  No lift, thrust, or pericardial friction rub. LUNGS:  Quite clear to auscultation and percussion. ABDOMEN:  Soft and nontender. Good bowel sounds. EXTREMITIES:  Good femoral pulses. Good pedal pulses. No pedal edema. Skin was warm and dry. No calf tenderness. Nail beds pink. Good cap refill. PULSES:  Bilateral symmetrical radial, brachial and carotid pulses. No carotid bruits. Good femoral and pedal pulses. NEUROLOGIC EXAM:  Within normal limits. PSYCHIATRIC EXAM:  Within normal limits. LABORATORY DATA:  From today, his sodium was 138, potassium 4.3, BUN 14, creatinine 1.04, GFR greater than 60, magnesium 1.9, glucose 290. ALT was 20, AST was 20. His TSH was 1.79. White count 5.2, hemoglobin 13.1 with a platelet count of 109,227. His hemoglobin A1c was not back as of yet. EKG showed normal sinus rhythm with nonspecific ST changes. Chest x-ray done today is unremarkable. IMPRESSION:  1. Coronary artery disease. 2.  Angina in 12/2017, consisting of chest heaviness and marked shortness of breath. 3.  Abnormal stress test with subsequent cardiac catheterization on 01/17/2018, showed 90% left main trunk, 90% LAD, 95% circumflex, 70% RCA, with an EF of 60%. 4.  Open heart surgery on 01/18/2018, by Dr. Adri Valerio with a LIMA to the LAD, a vein graft to the OM, and a vein graft to the acute marginal branch of the right coronary artery with a good end result. 5.  Normal LV function. 6.  Hypertension, well controlled. 7.  Hyperlipidemia, well controlled. 8.  Insulin-dependent diabetes, followed by Dr. Jamie Moreno. Hemoglobin A1c pending. 9.  Prostate carcinoma with prostatectomy in 06/2006, with left upper lobectomy because of metastatic adenocarcinoma in 2007, with no reoccurrence. PLAN:  1. No change in medications. 2.  We will see in 1 year. 3.  Earlier if he would start developing any anginal-type symptoms. DISCUSSION:  Mr. Richard Maradiaga overall is doing well. He has had no anginal-type symptoms, such as shortness of breath, loss of energy or chest pain. I made no change in medications. His risk factors are well modified. Again, his diabetes has been a work in progress. I will meet with him in a year unless a problem would develop. Thank you very much for allowing me the privilege of seeing Mr. Richard Maradiaga. If you have any questions on my thoughts, please do not hesitate to contact me.      Sincerely,        Sebastian Pérez    D: 07/13/2020 9:25:14     T: 07/13/2020 9:29:06     [email or web address]  Job#: [id number]   Doc#: [id number]

## 2020-07-14 LAB
EKG ATRIAL RATE: 61 BPM
EKG P AXIS: 37 DEGREES
EKG P-R INTERVAL: 160 MS
EKG Q-T INTERVAL: 418 MS
EKG QRS DURATION: 86 MS
EKG QTC CALCULATION (BAZETT): 420 MS
EKG R AXIS: 7 DEGREES
EKG T AXIS: 58 DEGREES
EKG VENTRICULAR RATE: 61 BPM
ESTIMATED AVERAGE GLUCOSE: 180 MG/DL
HBA1C MFR BLD: 7.9 % (ref 4–6)

## 2020-07-14 PROCEDURE — 93010 ELECTROCARDIOGRAM REPORT: CPT | Performed by: INTERNAL MEDICINE

## 2020-07-15 NOTE — PROGRESS NOTES
Lorraine Moon M.D. [address] [place] [address]  [phone]        2020        Zay Becker MD  [address] [address]    RE:   Glenroy Mclean  :  1945    Dear Dr. Kaylee Reeves:    CHIEF COMPLAINT:  1. Coronary artery disease. 2.  Chest pain, noncardiac. 3.  Hypertension. HISTORY OF PRESENT ILLNESS:  I had the pleasure of seeing Mr. Miracle Alarcon in our office on 2020. He is a pleasant 79-year-old gentleman who developed increasing shortness of breath in 2017 along with chest tightness. He had never had a cardiac catheterization. I did a Lexiscan stress test.  He developed ST depression in aVL, V5 and V6 with a diffusion defect inferolaterally. His stress test was done on 2017. I proceeded on with a cardiac catheterization on 2018. This showed 90% left main trunk, 90% LAD, 95% circumflex, 70% RCA, with an EF of 60%. He had subsequent coronary artery bypass surgery on 2018, with a LIMA to the LAD, a vein graft to the OM, and a vein graft to the acute marginal branch of the right coronary artery. He has done well over this last year. He has had no chest pain or chest discomfort. He walks approximately 6000 steps a day. He has occasional sharp discomfort, not related to activity, which is clearly noncardiac. Energy level has been good and he has been working outside without difficulty. His diabetes continues to be work in process. CARDIAC RISK FACTORS:  Hypertension:  Positive. Hyperlipidemia:  Positive. Other Family Members:  Positive. Diabetes:  Positive. Peripheral Vascular Disease:  Negative. Smoking:  Negative. Bypass Surgery:  Positive. MEDICATIONS:  He is on aspirin 81 mg daily;  Lipitor 40 mg daily; vitamin D 50,000 units weekly; NPH 33 units at bedtime; regular insulin 28 in the morning, 12 at lunch, 20 at supper; lisinopril 5 mg b.i.d.; magnesium 500 mg daily; Glucophage 1000 mg b.i.d.; Toprol-XL 50 mg daily; Singulair 10 mg daily. PAST MEDICAL HISTORY:  1.  Prostate carcinoma diagnosed in , with a robotic prostatectomy in 2006, no reoccurrence. 2.  Pulmonary nodules in  with left upper lobectomy, positive for metastatic adenocarcinoma, done at [facility], with his cancer followed at [facility] in [place]. 3.  Insulin-dependent diabetes. 4.  Severe arthritis. 5.  Hypertension. 6.  Vasectomy. 7.  Neuropathy. 8.  Hyperlipidemia, well controlled. 9.  Cardiac as described above. FAMILY HISTORY:  Father  of CHF, of an MI. Mother  of bypass surgery. Sister had a stent. One brother is healthy. SOCIAL HISTORY:  He is 70years old,  to Padmini Starks. He has 3 children. Quit smoking 32 years ago. Retired from [employer] in [employer]. Does not smoke, does not drink alcohol. Has a [employer] tricycle. His daughter is a nurse in [place]. REVIEW OF SYSTEMS:  Cardiac as above. Other systems reviewed including constitutional, eyes, ears, nose and throat, cardiovascular, respiratory, GI, , musculoskeletal, integumentary, neurologic, endocrine, hematologic and allergic/immunologic are negative except for what is described above. No weight loss or weight gain. No change in bowel habits. No blood in stool. No fevers, sweats or chills. PHYSICAL EXAMINATION:  VITAL SIGNS:  His blood pressure was 140/70, with a heart rate of 70 and regular. Respiratory rate 18. GENERAL:  He is a pleasant 53-year-old gentleman. Denied pain. He was oriented to person, place and time. Answered questions appropriately. SKIN:  No unusual skin changes. HEENT:  The pupils are equally round and intact. Mucous membranes were dry. NECK:  No JVD. Good carotid pulses. No carotid bruits. No lymphadenopathy or thyromegaly. CARDIOVASCULAR EXAM:  S1 and S2 were normal.  No S3 or S4. Soft systolic blowing type murmur. No diastolic murmur. PMI was normal.  No lift, thrust, or pericardial friction rub. LUNGS:  Quite clear to auscultation and percussion. ABDOMEN:  Soft and nontender. Good bowel sounds. EXTREMITIES:  Good femoral pulses. Good pedal pulses. No pedal edema. Skin was warm and dry. No calf tenderness. Nail beds pink. Good cap refill. PULSES:  Bilateral symmetrical radial, brachial and carotid pulses. No carotid bruits. Good femoral and pedal pulses. NEUROLOGIC EXAM:  Within normal limits. PSYCHIATRIC EXAM:  Within normal limits. LABORATORY DATA:  From today, his sodium was 138, potassium 4.3, BUN 14, creatinine 1.04, GFR greater than 60, magnesium 1.9, glucose 290. ALT was 20, AST was 20. His TSH was 1.79. White count 5.2, hemoglobin 13.1 with a platelet count of 420,476. His hemoglobin A1c was not back as of yet. EKG showed normal sinus rhythm with nonspecific ST changes. Chest x-ray done today is unremarkable. IMPRESSION:  1. Coronary artery disease. 2.  Angina in 12/2017, consisting of chest heaviness and marked shortness of breath. 3.  Abnormal stress test with subsequent cardiac catheterization on 01/17/2018, showed 90% left main trunk, 90% LAD, 95% circumflex, 70% RCA, with an EF of 60%. 4.  Open heart surgery on 01/18/2018, by Dr. Jordy Loza with a LIMA to the LAD, a vein graft to the OM, and a vein graft to the acute marginal branch of the right coronary artery with a good end result. 5.  Normal LV function. 6.  Hypertension, well controlled. 7.  Hyperlipidemia, well controlled. 8.  Insulin-dependent diabetes, followed by Dr. Lieutenant Soto. Hemoglobin A1c pending. 9.  Prostate carcinoma with prostatectomy in 06/2006, with left upper lobectomy because of metastatic adenocarcinoma in 2007, with no reoccurrence. PLAN:  1. No change in medications. 2.  We will see in 1 year. 3.  Earlier if he would start developing any anginal-type symptoms. DISCUSSION:  Mr. Quan Morris overall is doing well.   He has had no anginal-type symptoms, such as shortness of breath, loss of energy or chest pain. I made no change in medications. His risk factors are well modified. Again, his diabetes has been a work in progress. I will meet with him in a year unless a problem would develop. Thank you very much for allowing me the privilege of seeing Mr. Chris Reynolds. If you have any questions on my thoughts, please do not hesitate to contact me.      Sincerely,        Ant Hernandez    D: 07/13/2020 9:25:14     T: 07/13/2020 9:29:06     [email or web address]  Job#: [id number]   Doc#: [id number]

## 2020-09-09 ENCOUNTER — TELEPHONE (OUTPATIENT)
Dept: FAMILY MEDICINE CLINIC | Age: 75
End: 2020-09-09

## 2020-09-17 LAB — PROSTATE SPECIFIC ANTIGEN: 0.03 NG/ML

## 2020-11-02 ENCOUNTER — OFFICE VISIT (OUTPATIENT)
Dept: FAMILY MEDICINE CLINIC | Age: 75
End: 2020-11-02
Payer: MEDICARE

## 2020-11-02 VITALS
DIASTOLIC BLOOD PRESSURE: 72 MMHG | WEIGHT: 198 LBS | HEIGHT: 70 IN | BODY MASS INDEX: 28.35 KG/M2 | HEART RATE: 78 BPM | SYSTOLIC BLOOD PRESSURE: 136 MMHG | OXYGEN SATURATION: 97 %

## 2020-11-02 PROBLEM — I20.9 ANGINA, CLASS III (HCC): Status: RESOLVED | Noted: 2017-12-22 | Resolved: 2020-11-02

## 2020-11-02 PROCEDURE — 3051F HG A1C>EQUAL 7.0%<8.0%: CPT | Performed by: FAMILY MEDICINE

## 2020-11-02 PROCEDURE — 4040F PNEUMOC VAC/ADMIN/RCVD: CPT | Performed by: FAMILY MEDICINE

## 2020-11-02 PROCEDURE — G8482 FLU IMMUNIZE ORDER/ADMIN: HCPCS | Performed by: FAMILY MEDICINE

## 2020-11-02 PROCEDURE — G0439 PPPS, SUBSEQ VISIT: HCPCS | Performed by: FAMILY MEDICINE

## 2020-11-02 PROCEDURE — 3017F COLORECTAL CA SCREEN DOC REV: CPT | Performed by: FAMILY MEDICINE

## 2020-11-02 PROCEDURE — 1036F TOBACCO NON-USER: CPT | Performed by: FAMILY MEDICINE

## 2020-11-02 PROCEDURE — G0008 ADMIN INFLUENZA VIRUS VAC: HCPCS | Performed by: FAMILY MEDICINE

## 2020-11-02 PROCEDURE — G8417 CALC BMI ABV UP PARAM F/U: HCPCS | Performed by: FAMILY MEDICINE

## 2020-11-02 PROCEDURE — G8427 DOCREV CUR MEDS BY ELIG CLIN: HCPCS | Performed by: FAMILY MEDICINE

## 2020-11-02 PROCEDURE — 1123F ACP DISCUSS/DSCN MKR DOCD: CPT | Performed by: FAMILY MEDICINE

## 2020-11-02 PROCEDURE — 90686 IIV4 VACC NO PRSV 0.5 ML IM: CPT | Performed by: FAMILY MEDICINE

## 2020-11-02 PROCEDURE — 2022F DILAT RTA XM EVC RTNOPTHY: CPT | Performed by: FAMILY MEDICINE

## 2020-11-02 SDOH — ECONOMIC STABILITY: FOOD INSECURITY: WITHIN THE PAST 12 MONTHS, YOU WORRIED THAT YOUR FOOD WOULD RUN OUT BEFORE YOU GOT MONEY TO BUY MORE.: NEVER TRUE

## 2020-11-02 SDOH — ECONOMIC STABILITY: TRANSPORTATION INSECURITY
IN THE PAST 12 MONTHS, HAS THE LACK OF TRANSPORTATION KEPT YOU FROM MEDICAL APPOINTMENTS OR FROM GETTING MEDICATIONS?: NO

## 2020-11-02 SDOH — ECONOMIC STABILITY: INCOME INSECURITY: HOW HARD IS IT FOR YOU TO PAY FOR THE VERY BASICS LIKE FOOD, HOUSING, MEDICAL CARE, AND HEATING?: NOT HARD AT ALL

## 2020-11-02 SDOH — ECONOMIC STABILITY: FOOD INSECURITY: WITHIN THE PAST 12 MONTHS, THE FOOD YOU BOUGHT JUST DIDN'T LAST AND YOU DIDN'T HAVE MONEY TO GET MORE.: NEVER TRUE

## 2020-11-02 SDOH — ECONOMIC STABILITY: TRANSPORTATION INSECURITY
IN THE PAST 12 MONTHS, HAS LACK OF TRANSPORTATION KEPT YOU FROM MEETINGS, WORK, OR FROM GETTING THINGS NEEDED FOR DAILY LIVING?: NO

## 2020-11-02 ASSESSMENT — PATIENT HEALTH QUESTIONNAIRE - PHQ9
SUM OF ALL RESPONSES TO PHQ9 QUESTIONS 1 & 2: 2
SUM OF ALL RESPONSES TO PHQ QUESTIONS 1-9: 2
1. LITTLE INTEREST OR PLEASURE IN DOING THINGS: 1
SUM OF ALL RESPONSES TO PHQ QUESTIONS 1-9: 2
SUM OF ALL RESPONSES TO PHQ QUESTIONS 1-9: 2
2. FEELING DOWN, DEPRESSED OR HOPELESS: 1

## 2020-11-02 ASSESSMENT — LIFESTYLE VARIABLES: HOW OFTEN DO YOU HAVE A DRINK CONTAINING ALCOHOL: 0

## 2020-11-02 NOTE — PROGRESS NOTES
Medicare Annual Wellness Visit       Miriam Khan  YOB: 1945    Date of Service:  11/2/2020    Patient Active Problem List   Diagnosis    Essential hypertension, benign    Incontinence of urine    Prostate cancer ([facility]    Hyperlipidemia    History of colonic polyps    Osteoporosis    Type 2 diabetes mellitus with diabetic neuropathy, with long-term current use of insulin ([facility]    Seasonal allergies    Abnormal stress ECG    Coronary artery disease involving autologous vein coronary bypass graft without angina pectoris       Allergies   Allergen Reactions    Bee Venom Hives    Januvia [Sitagliptin Phosphate]      Numbness     No outpatient medications have been marked as taking for the 11/2/20 encounter (Office Visit) with Elina Chin MD.       Past Medical History:   Diagnosis Date    Allergic rhinitis     Allergist - Dr Tl Slaughter CAD (coronary artery disease)     Dr Lance Palomares [facility]) 2006    Prostate Cancer (Removed prostate)-- Dr Lorraine Guadarrama Diabetic neuropathy [facility])     podiatry - Dr Everlena Mcardle Hyperlipidemia     Hypertension     Type II or unspecified type diabetes mellitus without mention of complication, not stated as uncontrolled      Past Surgical History:   Procedure Laterality Date    CARDIAC CATHETERIZATION  01/17/2018    Lt Cardiac Cath Dr Temitope Cortes  2014    COLONOSCOPY  2013    CYST REMOVAL      LUNG BIOPSY  2007    PROSTATE BIOPSY      TOOTH EXTRACTION  4/2014    VASECTOMY       Family History   Problem Relation Age of Onset    Diabetes Mother     Dementia Mother     Heart Disease Father     Diabetes Sister     Diabetes Brother      Social History     Socioeconomic History    Marital status:      Spouse name: Not on file    Number of children: Not on file    Years of education: Not on file    Highest education level: Not on file   Occupational History    Not on file   Social Needs    Financial resource strain: Not hard at all    Food insecurity     Worry: Never true     Inability: Never true    Transportation needs     Medical: No     Non-medical: No   Tobacco Use    Smoking status: Former Smoker     Packs/day: 1.00     Years: 20.00     Pack years: 20.00     Last attempt to quit: 1985     Years since quittin.8    Smokeless tobacco: Never Used   Substance and Sexual Activity    Alcohol use: No    Drug use: No    Sexual activity: Not on file   Lifestyle    Physical activity     Days per week: Not on file     Minutes per session: Not on file    Stress: Not on file   Relationships    Social connections     Talks on phone: Not on file     Gets together: Not on file     Attends Latter day service: Not on file     Active member of club or organization: Not on file     Attends meetings of clubs or organizations: Not on file     Relationship status: Not on file    Intimate partner violence     Fear of current or ex partner: Not on file     Emotionally abused: Not on file     Physically abused: Not on file     Forced sexual activity: Not on file   Other Topics Concern    Not on file   Social History Narrative    Not on file       Consultants:   Patient Care Team:  Cristi Davila MD as PCP - Julia Reddy MD as PCP - [facility] [place]  Anaya Proctor MD as Consulting Physician (Hematology and Oncology)  Jane Armenta MD as Consulting Physician (Cardiology)  Harvey Goodwin MD as Consulting Physician (General Surgery)    Wt Readings from Last 3 Encounters:   20 198 lb (89.8 kg)   19 192 lb (87.1 kg)   07/15/19 196 lb (88.9 kg)     BP Readings from Last 3 Encounters:   20 136/72   19 130/60   07/15/19 (!) 140/60       Pertinent Physical Exam    Vitals:    20 0944   BP: 136/72   Pulse: 78   SpO2: 97%   Weight: 198 lb (89.8 kg)   Height: 5' 10\" (1.778 m)     Body mass index is 28.41 kg/m².      ROS (information related to health maintenance and screening)  10 systems reviewed and WNL except complaining of \"retaining water\"    Physical Exam (Minimal exam needed for wellness visit)  General Appearance: alert and oriented to person, place and time, well-developed and well-nourished, in no acute distress  Skin: warm and dry, no rash or erythema  Head: normocephalic and atraumatic  Eyes: pupils equal, conjunctivae normal  ENT: bilateral tympanic membrane normal, bilateral external ear normal, bilateral ear canal normal and tympanic membrane, external ear and ear canal normal bilaterally, oropharynx clear and moist  Neck: neck supple and non tender without mass, no thyromegaly, no cervical lymphadenopathy   Pulmonary/Chest: clear to auscultation bilaterally - no wheezes, rales or rhonchi, normal air movement  Cardiovascular: normal rate, regular rhythm, no murmurs and no carotid bruits  Abdomen: soft, non-tender, non-distended, normal bowels sounds, and no masses  Extremities: no erythema, swelling or tenderness of extremities  Musculoskeletal: normal range of motion, no joint swelling or tenderness  Neurologic: gait and coordination normal, speech normal, sensation to light touch intact and no tremor.  microfilament sensation intact and no lesions or sores on feet bilateral. +2 DP pulse bilateral.        Current Health Maintenance Status  Health Maintenance   Topic Date Due    Hepatitis C screen  1945    DTaP/Tdap/Td vaccine (1 - Tdap) 01/03/1966    Shingles Vaccine (1 of 2) 01/03/1997    PSA counseling  01/03/1997    Annual Wellness Visit (AWV)  05/29/2019    Diabetic foot exam  09/06/2019    Diabetic microalbuminuria test  09/06/2019    Flu vaccine (1) 09/01/2020    Diabetic retinal exam  06/30/2021    A1C test (Diabetic or Prediabetic)  07/13/2021    Lipid screen  07/13/2021    Potassium monitoring  07/13/2021    Creatinine monitoring  07/13/2021    Colon cancer screen colonoscopy  10/14/2024    Pneumococcal 65+ years Vaccine  Completed    AAA screen Completed    Hepatitis A vaccine  Aged Out    Hib vaccine  Aged Out    Meningococcal (ACWY) vaccine  Aged [employer] AWV Workflow and Risk Assessment    Review Medicare Health Risk Assessment form completed by patient  Document vitals, height included (3 vital signs minimum)      Update Allergies and Medications    complete  Update Family and Social History (HRA #2-6)    complete  Update Health Maintenance (HRA #7-13)    incomplete  Update Vaccines in Historical Immunizations (HRA #9-10)    incomplete  Update Patient Care Team- in [place] ([place] #14)     complete  Has patient seen an eye specialist within the past 2 years (HRA #7)? yes     Has patient seen a dentist within the past year (HRA #11)? no    Medical Suppliers Used (diabetic supplies, 02, medical equipment, etc.) (HRA #14):  DM supplies and Dr Cordelia Whelan [facility] [employer] (HRA # 15): Advanced Directive:  yes,   copy requested      Health Risk Assessment   Health Risk Assessment form completed by patient, reviewed and scanned into chart. HRA interpretation guide- enter risks identified through screenings into table below    1. Behavioral Risks:    Tobacco:  If patient responded \"yes\" to HRA question #6, screen is positive. Result - negative  Alcohol use: Add up scores of alcohol use questions (HRA # 3-5)- positive result is 3 or above for women and 4 or above for men. Result - negative  Physical activity:  If patient responded \"no\" to HRA question #16, screen is positive. Result - negative  Nutrition:  Body mass index is 28.41 kg/m². Romero Salter If patient responded \"yes\" to HRA question #17, or BMI <18 or >30, screen is positive. Result - negative    BEHAVIORAL RISKS IDENTIFIED:   None identified      2. Psychosocial Risks:  Anxiety:  Add up scores of anxiety questions (HRA #18-19)- positive result is 3 or above, or if patient has current or past diagnosis of anxiety.    Result - negative  Depression:  Add up scores of PHQ-2 (HRA #20-21): positive result is 3 or above, or if patient has current or past diagnosis of depression. Result - negative  Social/Emotional support:  If patient responded \"sometimes,\" or \"rarely/never\" to HRA question #22, screen is positive. Result - stressed with his wife's cancer  Pain:  If patient responded \"a lot\" to HRA question #24, screen is positive. Result - negative    PSYCHOSOCIAL RISKS IDENTIFIED:   None identified      3. Functional/Safety Risks:    Memory:  Mini-Cognitive Exam   A. Mini-Cog Screen:  Give patient the following words to remember and ask to repeat- advise patient that he will be asked to recall items later:  H[place]IL[address] [facility]. Clock Drawing Test (CDT): Have patient draw the face of a clock and put the numbers in the correct positions. Then draw in the hands at ten minutes after eleven. Score clock drawing test as \"normal\" if the patient places the correct time and the clock appears grossly normal, or \"abnormal.\"    CDT: Normal      C. The patient is then asked to recall the three words. (This should be done after one minute, or after the Clock Drawing Test is complete)(if remembers all 3 or none of the words, then do not need to perform CDT)   Mini-Cog Scoring: 3/3 words recalled       MINI-COG INTERPRETATION: 3- Negative screen for dementia     Living situation:  If patient responded \"lives alone\" to HRA question #23, screen is positive. Result - negative  Hearing: If patient responded \"yes\" to HRA question #25, screen is positive. Result - positive-- wears hearing aids  Safety:  If patient responded \"no\" to any of the HRA questions #26-31, screen is positive. Result - negative  ADLs:  If patient responded \"yes\" to HRA questions #32 or #33, screen is positive. Result - negative  Fall risk:  Per MA note, If timed Get Up & Go test unsteady or longer than 20 seconds, or falls reported per HRA question #34, screen is positive.      Complete outpatient fall risk assessment in document flow sheet Result - negative     Vision: (corrected vision)    Left eye  Right eye  Both eyes      20/    20/    20/       SAFETY RISKS IDENTIFIED:   None identified      Scan HRA form into media section of chart. Perform Vision Screening at Visit not performed      Personalized Preventive Plan   This plan is provided to the patient in written form.        Preventive plan of care for [place]        11/2/2020           Preventive Measures Status       Recommendations for screening   Prostate Cancer Screen  No results found for: PSA  Screening covered annually  6/21/19 consistent with <0.03    Colon Cancer Screen  Last colonoscopy: 10/14/19 Screening covered every 10 years  Repeat in 5 years   Diabetes Screen  Glucose (mg/dL)   Date Value   07/13/2020 290 (H)    Screening covered annually, every 6 months if pre-diabetic  This test is not clinically indicated   Cholesterol Screen  Lab Results   Component Value Date    CHOL 114 07/13/2020    TRIG 292 (H) 07/13/2020    HDL 30 (L) 07/13/2020    LDLCALC 72 08/03/2017    LDLCHOLESTEROL 26 07/13/2020    Screening covered every 5 years   Repeat yearly   Abdominal Aneurysm Screen  No Screening covered once between the ages of 72 and 76 for any male who has smoked at least 100 cigarettes in his lifetime or with FH of aneurysm (must be ordered during Welcome to Medicare Physical- IPPE, not covered with AWV)   This test is not clinically indicated   Aspirin for Cardiovascular Prevention   Yes Continue daily aspirin    Recommended Immunizations    Immunization History   Administered Date(s) Administered    Influenza 11/13/2013    Influenza Vaccine, unspecified formulation 11/13/2013, 12/04/2017, 12/19/2018    Influenza Virus Vaccine 11/17/2014, 11/16/2015    Influenza, Quadv, IM, PF (6 mo and older Fluzone, Flulaval, Fluarix, and 3 yrs and older Afluria) 11/22/2016, 12/04/2017, 12/19/2018, 11/04/2019    Pneumococcal Conjugate 13-valent Dagoberto Lundy) 11/16/2015    Pneumococcal Polysaccharide ([id number]) 11/01/2007, 11/13/2013    Influenza vaccine: administered today, risks and benefits discussed  Pneumonia vaccine: recommended, but declined  Shingles vaccine: recommended, but declined  Tetanus vaccine: utd          Risk Factors and Conditions Identified During Visit  (Nathan completes sections below)  Risks Identified Treatment options   Behavioral Risks  · None identified   · No treatment is necessary   Psychosocial Risks  · stress   · Pt 's wife has cancer too. Functional/Safety Risks  · Hearing problems   · No treatment is necessary     Other Recommendations/Plans ·   flu shot today               Visit Diagnosis   Diagnosis Orders   1. Medicare annual wellness visit, subsequent     2. Malignant neoplasm of prostate ([facility] [address] [address]     3. Need for influenza vaccination  INFLUENZA, QUADV, 3 YRS AND OLDER, IM PF, PREFILL SYR OR SDV, 0.5ML (AFLURIA QUADV, PF)   4.  Type 2 diabetes mellitus with diabetic neuropathy, with long-term current use of insulin (HCC)   DIABETES FOOT EXAM

## 2020-11-02 NOTE — PATIENT INSTRUCTIONS
Survey: You may be receiving a survey from [employer] regarding your visit today. You may get this in the mail, through your MyChart or in your email. Please complete the survey to enable us to provide the highest quality of care to you and your family. Please also, mention our names. If you cannot score us as very good (5 Stars) on any question, please feel free to call the office to discuss how we could have made your experience exceptional.      Thank You!         MD Douglas Nava LPN

## 2021-01-18 LAB
AVERAGE GLUCOSE: NORMAL
HBA1C MFR BLD: 7.7 %

## 2021-03-09 RX ORDER — LISINOPRIL 5 MG/1
TABLET ORAL
Qty: 180 TABLET | Refills: 3 | Status: SHIPPED | OUTPATIENT
Start: 2021-03-09 | End: 2022-08-02 | Stop reason: ALTCHOICE

## 2021-04-25 DIAGNOSIS — E78.00 PURE HYPERCHOLESTEROLEMIA: ICD-10-CM

## 2021-04-26 RX ORDER — ATORVASTATIN CALCIUM 40 MG/1
TABLET, FILM COATED ORAL
Qty: 90 TABLET | Refills: 3 | Status: SHIPPED | OUTPATIENT
Start: 2021-04-26 | End: 2021-11-04

## 2021-05-04 ENCOUNTER — OFFICE VISIT (OUTPATIENT)
Dept: FAMILY MEDICINE CLINIC | Age: 76
End: 2021-05-04
Payer: MEDICARE

## 2021-05-04 VITALS
BODY MASS INDEX: 28.7 KG/M2 | OXYGEN SATURATION: 98 % | DIASTOLIC BLOOD PRESSURE: 70 MMHG | HEART RATE: 66 BPM | WEIGHT: 200 LBS | SYSTOLIC BLOOD PRESSURE: 136 MMHG

## 2021-05-04 DIAGNOSIS — G47.09 OTHER INSOMNIA: ICD-10-CM

## 2021-05-04 DIAGNOSIS — E78.00 PURE HYPERCHOLESTEROLEMIA: ICD-10-CM

## 2021-05-04 DIAGNOSIS — E11.40 TYPE 2 DIABETES MELLITUS WITH DIABETIC NEUROPATHY, WITH LONG-TERM CURRENT USE OF INSULIN (HCC): Primary | ICD-10-CM

## 2021-05-04 DIAGNOSIS — Z79.4 TYPE 2 DIABETES MELLITUS WITH DIABETIC NEUROPATHY, WITH LONG-TERM CURRENT USE OF INSULIN (HCC): Primary | ICD-10-CM

## 2021-05-04 DIAGNOSIS — C61 MALIGNANT NEOPLASM OF PROSTATE (HCC): ICD-10-CM

## 2021-05-04 DIAGNOSIS — I10 ESSENTIAL HYPERTENSION, BENIGN: ICD-10-CM

## 2021-05-04 PROCEDURE — 2022F DILAT RTA XM EVC RTNOPTHY: CPT | Performed by: FAMILY MEDICINE

## 2021-05-04 PROCEDURE — 1036F TOBACCO NON-USER: CPT | Performed by: FAMILY MEDICINE

## 2021-05-04 PROCEDURE — 99214 OFFICE O/P EST MOD 30 MIN: CPT | Performed by: FAMILY MEDICINE

## 2021-05-04 PROCEDURE — 3046F HEMOGLOBIN A1C LEVEL >9.0%: CPT | Performed by: FAMILY MEDICINE

## 2021-05-04 PROCEDURE — 4040F PNEUMOC VAC/ADMIN/RCVD: CPT | Performed by: FAMILY MEDICINE

## 2021-05-04 PROCEDURE — G8417 CALC BMI ABV UP PARAM F/U: HCPCS | Performed by: FAMILY MEDICINE

## 2021-05-04 PROCEDURE — G8427 DOCREV CUR MEDS BY ELIG CLIN: HCPCS | Performed by: FAMILY MEDICINE

## 2021-05-04 PROCEDURE — 3017F COLORECTAL CA SCREEN DOC REV: CPT | Performed by: FAMILY MEDICINE

## 2021-05-04 PROCEDURE — 1123F ACP DISCUSS/DSCN MKR DOCD: CPT | Performed by: FAMILY MEDICINE

## 2021-05-04 RX ORDER — LEVOCETIRIZINE DIHYDROCHLORIDE 5 MG/1
TABLET, FILM COATED ORAL
COMMUNITY
Start: 2021-05-02 | End: 2022-07-21

## 2021-05-04 RX ORDER — TRAZODONE HYDROCHLORIDE 50 MG/1
50 TABLET ORAL NIGHTLY
Qty: 30 TABLET | Refills: 5 | Status: SHIPPED | OUTPATIENT
Start: 2021-05-04 | End: 2021-11-23 | Stop reason: SDUPTHER

## 2021-05-04 ASSESSMENT — ENCOUNTER SYMPTOMS
NAUSEA: 0
DIARRHEA: 0
ABDOMINAL PAIN: 0
COUGH: 0
TROUBLE SWALLOWING: 0
EYE DISCHARGE: 0
EYE REDNESS: 0
VOMITING: 0
CONSTIPATION: 0
SHORTNESS OF BREATH: 0
BLOOD IN STOOL: 0

## 2021-05-04 ASSESSMENT — PATIENT HEALTH QUESTIONNAIRE - PHQ9
1. LITTLE INTEREST OR PLEASURE IN DOING THINGS: 0
2. FEELING DOWN, DEPRESSED OR HOPELESS: 1
SUM OF ALL RESPONSES TO PHQ QUESTIONS 1-9: 1
SUM OF ALL RESPONSES TO PHQ QUESTIONS 1-9: 1

## 2021-05-04 NOTE — PROGRESS NOTES
HPI Notes    Name: Carlene Hughes  : 1945        Chief Complaint:     Chief Complaint   Patient presents with    Diabetes     Pt follows with endocrine    Hypertension    Hyperlipidemia       History of Present Illness:     Carlene Hughes is a 78 y.o.  male who presents with Diabetes (Pt follows with endocrine), Hypertension, and Hyperlipidemia      Diabetes  He presents for his follow-up diabetic visit. He has type 2 diabetes mellitus. His disease course has been stable. There are no hypoglycemic associated symptoms. Pertinent negatives for hypoglycemia include no confusion, dizziness, headaches or tremors. There are no diabetic associated symptoms. Pertinent negatives for diabetes include no chest pain and no fatigue. There are no hypoglycemic complications. There are no diabetic complications. Risk factors for coronary artery disease include diabetes mellitus, hypertension, dyslipidemia and male sex. Current diabetic treatment includes insulin injections and oral agent (monotherapy). He is compliant with treatment most of the time. His weight is stable. He is following a generally healthy diet. He participates in exercise intermittently (hasn't been walking his new dog as much). His home blood glucose trend is decreasing steadily (pt follows with Dr Arash Garcia). An ACE inhibitor/angiotensin II receptor blocker is being taken. He sees a podiatrist (Dr Shamika Mcintosh). Eye exam is current. Hypertension  This is a chronic problem. The current episode started more than 1 year ago. The problem is unchanged. The problem is controlled. Pertinent negatives include no chest pain, headaches, malaise/fatigue, neck pain, palpitations, peripheral edema or shortness of breath. There are no associated agents to hypertension. Risk factors for coronary artery disease include dyslipidemia, male gender and diabetes mellitus. The current treatment provides significant improvement. Hyperlipidemia  This is a chronic problem.  The current episode started more than 1 year ago. The problem is controlled. Recent lipid tests were reviewed and are normal. Exacerbating diseases include diabetes. He has no history of hypothyroidism. Pertinent negatives include no chest pain or shortness of breath. Current antihyperlipidemic treatment includes statins. The current treatment provides significant improvement of lipids. Risk factors for coronary artery disease include dyslipidemia, diabetes mellitus, hypertension and male sex. Prostate Ca - chronic but stable for years. Pt follows with Dr Driss Virk, Oncology and stable on the Lupron and casodex. Insomnia - pt having more trouble falling asleep. Pt gets frustrated as his wife has ovarian cancer and some demenita. Pt has more worries with her. Past Medical History:     Past Medical History:   Diagnosis Date    Allergic rhinitis     Allergist - Dr Lazaro Rodriguez CAD (coronary artery disease)     Dr Cris Pike [facility]) 2006    Prostate Cancer (Removed prostate)-- Dr Bimal Rice Diabetic neuropathy [facility])     podiatry - Dr Barbara Bean Hyperlipidemia     Hypertension     Type II or unspecified type diabetes mellitus without mention of complication, not stated as uncontrolled       Reviewed all health maintenance requirements and ordered appropriate tests  Health Maintenance Due   Topic Date Due    Hepatitis C screen  Never done    DTaP/Tdap/Td vaccine (1 - Tdap) Never done    Shingles Vaccine (1 of 2) Never done    Diabetic microalbuminuria test  09/06/2019       Past Surgical History:     Past Surgical History:   Procedure Laterality Date    CARDIAC CATHETERIZATION  01/17/2018    Lt Cardiac Cath Dr Shruti Ruiz  2014    COLONOSCOPY  2013    CYST REMOVAL      LUNG BIOPSY  2007    PROSTATE BIOPSY      TOOTH EXTRACTION  4/2014    VASECTOMY          Medications:       Prior to Admission medications    Medication Sig Start Date End Date Taking?  Authorizing Provider traZODone (DESYREL) 50 MG tablet Take 1 tablet by mouth nightly 5/4/21  Yes Aashish Edwards MD   atorvastatin (LIPITOR) 40 MG tablet TAKE 1 TABLET BY MOUTH EVERY DAY 4/26/21  Yes Sherice Sorenson MD   lisinopril (PRINIVIL;ZESTRIL) 5 MG tablet TAKE 1 TABLET BY MOUTH TWICE DAILY 3/9/21  Yes Sherice Sorenson MD   Multiple Vitamins-Minerals (MULTIVITAL PO) Take by mouth   Yes Historical Provider, MD   ergocalciferol (ERGOCALCIFEROL) 1.25 MG (33312 UT) capsule Take 50,000 Units by mouth once a week   Yes Historical Provider, MD   metoprolol succinate (TOPROL XL) 50 MG extended release tablet Take 50 mg by mouth daily   Yes Historical Provider, MD   Loratadine (CLARITIN PO) Take by mouth   Yes Historical Provider, MD   insulin NPH (HUMULIN N) 100 UNIT/ML injection vial Inject 26 U at bedtime  Patient taking differently: Inject 40 U at bedtime 9/6/18  Yes Aashish Edwards MD   insulin regular (HUMULIN R) 100 UNIT/ML injection Inject into the skin See Admin Instructions 28 U in the AM, 12 at lunch and 20 at supper   Yes Historical Provider, MD   metFORMIN (GLUCOPHAGE) 1000 MG tablet Take 1 tablet by mouth 2 times daily (with meals) 12/4/17  Yes Aashish Edwards MD   Calcium Carb-Cholecalciferol 600-500 MG-UNIT CAPS Take 1 capsule by mouth 2 times daily   Yes Historical Provider, MD   Magnesium Oxide 500 MG TABS Take 1 tablet by mouth daily   Yes Historical Provider, MD   aspirin 81 MG EC tablet Take 81 mg by mouth daily   Yes Historical Provider, MD   NONFORMULARY Indications: Neuravite takes 1 daily B1, B6, B12, alpha lipoic acid in each capsule. Yes Historical Provider, MD   leuprolide (LUPRON) 22.5 MG injection Inject 22.5 mg into the muscle. Every three months   Yes Historical Provider, MD   montelukast (SINGULAIR) 10 MG tablet Take 10 mg by mouth nightly.      Yes Historical Provider, MD   bicalutamide (CASODEX) 50 MG tablet Take 50 mg by mouth daily    Yes Historical Provider, MD   levocetirizine (XYZAL) 5 MG tablet appearance. He is well-developed. He is not ill-appearing. HENT:      Head: Normocephalic and atraumatic. Eyes:      General:         Right eye: No discharge. Left eye: No discharge. Conjunctiva/sclera: Conjunctivae normal.      Pupils: Pupils are equal, round, and reactive to light. Neck:      Musculoskeletal: Neck supple. Thyroid: No thyromegaly. Vascular: No carotid bruit. Cardiovascular:      Rate and Rhythm: Normal rate and regular rhythm. Heart sounds: No murmur. Pulmonary:      Effort: Pulmonary effort is normal.      Breath sounds: Normal breath sounds. Abdominal:      General: Bowel sounds are normal. There is no distension. Palpations: Abdomen is soft. Tenderness: There is no abdominal tenderness. Musculoskeletal:      Right lower leg: No edema. Left lower leg: No edema. Lymphadenopathy:      Cervical: No cervical adenopathy. Skin:     Findings: No erythema or rash. Neurological:      Mental Status: He is alert and oriented to person, place, and time.    Psychiatric:         Mood and Affect: Mood normal.         Behavior: Behavior normal.         Vitals:  /70   Pulse 66   Wt 200 lb (90.7 kg)   SpO2 98%   BMI 28.70 kg/m²       Data:     Lab Results   Component Value Date     07/13/2020    K 4.3 07/13/2020    CL 98 07/13/2020    CO2 24 07/13/2020    BUN 14 07/13/2020    CREATININE 1.04 07/13/2020    GLUCOSE 290 07/13/2020    PROT 7.3 07/13/2020    LABALBU 4.7 07/13/2020    BILITOT 0.60 07/13/2020    ALKPHOS 65 07/13/2020    AST 20 07/13/2020    ALT 20 07/13/2020     Lab Results   Component Value Date    WBC 5.2 07/13/2020    RBC 4.04 07/13/2020    HGB 13.1 07/13/2020    HCT 37.7 07/13/2020    MCV 93.3 07/13/2020    MCH 32.6 07/13/2020    MCHC 34.9 07/13/2020    RDW 13.3 07/13/2020     07/13/2020    MPV NOT REPORTED 07/13/2020     Lab Results   Component Value Date    TSH 1.79 07/13/2020     Lab Results   Component Value Date CHOL 114 07/13/2020    CHOL 123 08/03/2017    HDL 30 07/13/2020    PSA 0.03 09/17/2020    LABA1C 7.9 07/13/2020          Assessment/Plan:        1. Type 2 diabetes mellitus with diabetic neuropathy, with long-term current use of insulin (HCC)  F/U 6mos, Pt sees endocrine Dr Karla Adams . Do daily foot checks and yearly eye exams  Stable on insulin and metformin    2. Essential hypertension, benign  Stable on toprol and zestril     3. Pure hypercholesterolemia  Stable on the lipitor and labs via Endocrine    4. Malignant neoplasm of prostate ([facility] [address]  Stable on lupron and casodex per oncology, Dr Haydee Madrigal since 2006    5. Other insomnia  Try trazodone 50mg one nightly        Padmini Martinez received counseling on the following healthy behaviors: nutrition and exercise  Reviewed prior labs and health maintenance  Continue current medications, diet and exercise. Discussed use, benefit, and side effects of prescribed medications. Barriers to medication compliance addressed. Patient given educational materials - see patient instructions  Was a self-tracking handout given in paper form or via [employer]? Yes    Requested Prescriptions     Signed Prescriptions Disp Refills    traZODone (DESYREL) 50 MG tablet 30 tablet 5     Sig: Take 1 tablet by mouth nightly       All patient questions answered. Patient voiced understanding. Quality Measures    Body mass index is 28.7 kg/m². Elevated. Weight control planned discussed Healthy diet and regular exercise. BP: 136/70. Blood pressure is normal. Treatment plan consists of No treatment change needed. Fall Risk 11/2/2020 8/22/2019 11/27/2018 12/4/2017 9/7/2016 5/14/2015 5/13/2014   2 or more falls in past year? yes no no no no no no   Fall with injury in past year? no no no no no no no     The patient does not have a history of falls. I did not - not indicated , complete a risk assessment for falls.  A plan of care for falls No Treatment plan indicated    Lab Results   Component Value Date

## 2021-05-16 DIAGNOSIS — I10 ESSENTIAL (PRIMARY) HYPERTENSION: ICD-10-CM

## 2021-05-17 RX ORDER — METOPROLOL SUCCINATE 50 MG/1
TABLET, EXTENDED RELEASE ORAL
Qty: 180 TABLET | Refills: 3 | Status: SHIPPED | OUTPATIENT
Start: 2021-05-17 | End: 2022-06-08 | Stop reason: SDUPTHER

## 2021-07-10 ENCOUNTER — HOSPITAL ENCOUNTER (OUTPATIENT)
Age: 76
Discharge: HOME | End: 2021-07-12
Payer: MEDICARE

## 2021-07-10 ENCOUNTER — HOSPITAL ENCOUNTER (OUTPATIENT)
Age: 76
Discharge: HOME | End: 2021-07-10
Payer: MEDICARE

## 2021-07-10 ENCOUNTER — HOSPITAL ENCOUNTER (OUTPATIENT)
Dept: GENERAL RADIOLOGY | Age: 76
Discharge: HOME | End: 2021-07-12
Payer: MEDICARE

## 2021-07-10 DIAGNOSIS — E55.9 VITAMIN D DEFICIENCY DISEASE: ICD-10-CM

## 2021-07-10 DIAGNOSIS — Z79.4 TYPE 2 DIABETES MELLITUS WITH DIABETIC NEUROPATHY, WITH LONG-TERM CURRENT USE OF INSULIN (HCC): ICD-10-CM

## 2021-07-10 DIAGNOSIS — E11.40 TYPE 2 DIABETES MELLITUS WITH DIABETIC NEUROPATHY, WITH LONG-TERM CURRENT USE OF INSULIN (HCC): ICD-10-CM

## 2021-07-10 DIAGNOSIS — I10 ESSENTIAL HYPERTENSION, BENIGN: ICD-10-CM

## 2021-07-10 DIAGNOSIS — I25.810 CORONARY ARTERY DISEASE INVOLVING AUTOLOGOUS VEIN CORONARY BYPASS GRAFT WITHOUT ANGINA PECTORIS: ICD-10-CM

## 2021-07-10 DIAGNOSIS — E78.00 PURE HYPERCHOLESTEROLEMIA: ICD-10-CM

## 2021-07-10 LAB
ABSOLUTE EOS #: 0.3 K/UL (ref 0–0.4)
ABSOLUTE IMMATURE GRANULOCYTE: ABNORMAL K/UL (ref 0–0.3)
ABSOLUTE LYMPH #: 1.7 K/UL (ref 1–4.8)
ABSOLUTE MONO #: 0.5 K/UL (ref 0–1)
ALBUMIN SERPL-MCNC: 4.3 G/DL (ref 3.5–5.2)
ALBUMIN/GLOBULIN RATIO: ABNORMAL (ref 1–2.5)
ALP BLD-CCNC: 53 U/L (ref 40–129)
ALT SERPL-CCNC: 18 U/L (ref 5–41)
ANION GAP SERPL CALCULATED.3IONS-SCNC: 9 MMOL/L (ref 9–17)
AST SERPL-CCNC: 21 U/L
BASOPHILS # BLD: 1 % (ref 0–2)
BASOPHILS ABSOLUTE: 0 K/UL (ref 0–0.2)
BILIRUB SERPL-MCNC: 0.45 MG/DL (ref 0.3–1.2)
BUN BLDV-MCNC: 17 MG/DL (ref 8–23)
BUN/CREAT BLD: 18 (ref 9–20)
CALCIUM SERPL-MCNC: 9.2 MG/DL (ref 8.6–10.4)
CHLORIDE BLD-SCNC: 103 MMOL/L (ref 98–107)
CHOLESTEROL/HDL RATIO: 6.1
CHOLESTEROL: 195 MG/DL
CO2: 27 MMOL/L (ref 20–31)
CREAT SERPL-MCNC: 0.94 MG/DL (ref 0.7–1.2)
DIFFERENTIAL TYPE: YES
EOSINOPHILS RELATIVE PERCENT: 5 % (ref 0–5)
GFR AFRICAN AMERICAN: >60 ML/MIN
GFR NON-AFRICAN AMERICAN: >60 ML/MIN
GFR SERPL CREATININE-BSD FRML MDRD: ABNORMAL ML/MIN/{1.73_M2}
GFR SERPL CREATININE-BSD FRML MDRD: ABNORMAL ML/MIN/{1.73_M2}
GLUCOSE BLD-MCNC: 158 MG/DL (ref 70–99)
HCT VFR BLD CALC: 39 % (ref 41–53)
HDLC SERPL-MCNC: 32 MG/DL
HEMOGLOBIN: 13.5 G/DL (ref 13.5–17.5)
IMMATURE GRANULOCYTES: ABNORMAL %
LDL CHOLESTEROL DIRECT: 104 MG/DL
LDL CHOLESTEROL: ABNORMAL MG/DL (ref 0–130)
LYMPHOCYTES # BLD: 33 % (ref 13–44)
MAGNESIUM: 1.8 MG/DL (ref 1.6–2.6)
MCH RBC QN AUTO: 32.7 PG (ref 26–34)
MCHC RBC AUTO-ENTMCNC: 34.6 G/DL (ref 31–37)
MCV RBC AUTO: 94.5 FL (ref 80–100)
MONOCYTES # BLD: 10 % (ref 5–9)
NRBC AUTOMATED: ABNORMAL PER 100 WBC
PATIENT FASTING?: YES
PDW BLD-RTO: 14 % (ref 12.1–15.2)
PLATELET # BLD: 220 K/UL (ref 140–450)
PLATELET ESTIMATE: ABNORMAL
PMV BLD AUTO: ABNORMAL FL (ref 6–12)
POTASSIUM SERPL-SCNC: 4.3 MMOL/L (ref 3.7–5.3)
RBC # BLD: 4.13 M/UL (ref 4.5–5.9)
RBC # BLD: ABNORMAL 10*6/UL
SEG NEUTROPHILS: 51 % (ref 39–75)
SEGMENTED NEUTROPHILS ABSOLUTE COUNT: 2.7 K/UL (ref 2.1–6.5)
SODIUM BLD-SCNC: 139 MMOL/L (ref 135–144)
TOTAL PROTEIN: 6.7 G/DL (ref 6.4–8.3)
TRIGL SERPL-MCNC: 434 MG/DL
TSH SERPL DL<=0.05 MIU/L-ACNC: 2.36 MIU/L (ref 0.3–5)
VITAMIN D 25-HYDROXY: 57.8 NG/ML (ref 30–100)
VLDLC SERPL CALC-MCNC: ABNORMAL MG/DL (ref 1–30)
WBC # BLD: 5.1 K/UL (ref 3.5–11)
WBC # BLD: ABNORMAL 10*3/UL

## 2021-07-10 PROCEDURE — 83036 HEMOGLOBIN GLYCOSYLATED A1C: CPT

## 2021-07-10 PROCEDURE — 93005 ELECTROCARDIOGRAM TRACING: CPT

## 2021-07-10 PROCEDURE — 85025 COMPLETE CBC W/AUTO DIFF WBC: CPT

## 2021-07-10 PROCEDURE — 71046 X-RAY EXAM CHEST 2 VIEWS: CPT

## 2021-07-10 PROCEDURE — 80053 COMPREHEN METABOLIC PANEL: CPT

## 2021-07-10 PROCEDURE — 83735 ASSAY OF MAGNESIUM: CPT

## 2021-07-10 PROCEDURE — 83721 ASSAY OF BLOOD LIPOPROTEIN: CPT

## 2021-07-10 PROCEDURE — 84443 ASSAY THYROID STIM HORMONE: CPT

## 2021-07-10 PROCEDURE — 82306 VITAMIN D 25 HYDROXY: CPT

## 2021-07-10 PROCEDURE — 80061 LIPID PANEL: CPT

## 2021-07-10 PROCEDURE — 36415 COLL VENOUS BLD VENIPUNCTURE: CPT

## 2021-07-11 LAB
EKG ATRIAL RATE: 59 BPM
EKG P AXIS: 29 DEGREES
EKG P-R INTERVAL: 142 MS
EKG Q-T INTERVAL: 440 MS
EKG QRS DURATION: 86 MS
EKG QTC CALCULATION (BAZETT): 435 MS
EKG R AXIS: 7 DEGREES
EKG T AXIS: 71 DEGREES
EKG VENTRICULAR RATE: 59 BPM
ESTIMATED AVERAGE GLUCOSE: 163 MG/DL
HBA1C MFR BLD: 7.3 % (ref 4–6)

## 2021-07-11 PROCEDURE — 93010 ELECTROCARDIOGRAM REPORT: CPT | Performed by: INTERNAL MEDICINE

## 2021-07-12 ENCOUNTER — OFFICE VISIT (OUTPATIENT)
Dept: CARDIOLOGY CLINIC | Age: 76
End: 2021-07-12
Payer: MEDICARE

## 2021-07-12 VITALS
SYSTOLIC BLOOD PRESSURE: 130 MMHG | DIASTOLIC BLOOD PRESSURE: 60 MMHG | WEIGHT: 201 LBS | BODY MASS INDEX: 28.84 KG/M2 | HEART RATE: 68 BPM | OXYGEN SATURATION: 96 %

## 2021-07-12 DIAGNOSIS — E78.00 PURE HYPERCHOLESTEROLEMIA: ICD-10-CM

## 2021-07-12 DIAGNOSIS — E11.40 TYPE 2 DIABETES MELLITUS WITH DIABETIC NEUROPATHY, WITH LONG-TERM CURRENT USE OF INSULIN (HCC): ICD-10-CM

## 2021-07-12 DIAGNOSIS — I25.810 CORONARY ARTERY DISEASE INVOLVING AUTOLOGOUS VEIN CORONARY BYPASS GRAFT WITHOUT ANGINA PECTORIS: ICD-10-CM

## 2021-07-12 DIAGNOSIS — I10 ESSENTIAL (PRIMARY) HYPERTENSION: Primary | ICD-10-CM

## 2021-07-12 DIAGNOSIS — E55.9 VITAMIN D DEFICIENCY DISEASE: ICD-10-CM

## 2021-07-12 DIAGNOSIS — Z79.4 TYPE 2 DIABETES MELLITUS WITH DIABETIC NEUROPATHY, WITH LONG-TERM CURRENT USE OF INSULIN (HCC): ICD-10-CM

## 2021-07-12 PROCEDURE — 1123F ACP DISCUSS/DSCN MKR DOCD: CPT | Performed by: INTERNAL MEDICINE

## 2021-07-12 PROCEDURE — 3051F HG A1C>EQUAL 7.0%<8.0%: CPT | Performed by: INTERNAL MEDICINE

## 2021-07-12 PROCEDURE — G8417 CALC BMI ABV UP PARAM F/U: HCPCS | Performed by: INTERNAL MEDICINE

## 2021-07-12 PROCEDURE — G8427 DOCREV CUR MEDS BY ELIG CLIN: HCPCS | Performed by: INTERNAL MEDICINE

## 2021-07-12 PROCEDURE — 99214 OFFICE O/P EST MOD 30 MIN: CPT | Performed by: INTERNAL MEDICINE

## 2021-07-12 PROCEDURE — 3017F COLORECTAL CA SCREEN DOC REV: CPT | Performed by: INTERNAL MEDICINE

## 2021-07-12 PROCEDURE — 1036F TOBACCO NON-USER: CPT | Performed by: INTERNAL MEDICINE

## 2021-07-12 PROCEDURE — 4040F PNEUMOC VAC/ADMIN/RCVD: CPT | Performed by: INTERNAL MEDICINE

## 2021-07-12 PROCEDURE — 2022F DILAT RTA XM EVC RTNOPTHY: CPT | Performed by: INTERNAL MEDICINE

## 2021-07-12 NOTE — PROGRESS NOTES
Ov DR Desi Adamson 1 year follow up   No chest pain or sob. After second COVID injection  Had chest pain and fast heart beat. When woke up in the morning. After 11 went away. No hospitalizations or procedure   Since seen. Seen DR Mortimer Knock in May. Wife has cervical Cancer   Had surgery. Doing chemo now.

## 2021-07-12 NOTE — LETTER
Evangelina Ahumada, M.D. [address] [place] [address]  [phone]        2021        Kurtis Severin, MD  [address] [address]    RE:   Ashley Clark  :  1945    Dear Dr. Ramachandran Fee:    CHIEF COMPLAINT:  1. Coronary artery disease. 2.  Noncardiac chest pain. HISTORY OF PRESENT ILLNESS:  I had the pleasure of seeing Mr. Ian Sharp in the office on 2021. He is a pleasant 68-year-old gentleman who developed shortness of breath and chest pain in 2017, with chest tightness. We did a Lexiscan stress test, with inferolateral ischemia. I did a catheterization on 2018, that showed 90% left main trunk, 90% LAD, 95% circumflex, 70% RCA with an EF of 60%. He had bypass surgery on 2018, with a LIMA to the LAD, a vein graft to the OM, and a vein graft to the acute marginal branch of the right coronary artery. He has been under much stress with his wife having cervical cancer and is being treated at the [facility]. He did have a second COVID injection in for approximately 3 weeks afterwards he was having some chest pain and fast heartbeats especially in the morning. This has subsided entirely. He has had no chest pain, shortness of breath, loss of energy at this point. He is very emotional concerning his wife's cancer. His diabetes is under better control with his hemoglobin A1c of 7.3. He has had no hospitalizations or procedures since I last saw him. CARDIAC RISK FACTORS:  Hypertension:  Positive. Hyperlipidemia:  Positive. Other Family Members:  Positive. Diabetes:  Positive. Peripheral Vascular Disease:  Negative. Smoking:  Negative. Bypass Surgery:  Positive.     MEDICATIONS AT THIS TIME:  He is on aspirin 81 mg daily, Lipitor 40 mg daily, vitamin D 50,000 units weekly, NPH 40 units at bedtime, regular insulin sliding scale, Lupron 22.5 mg every 3 months, Xyzal 5 mg daily, lisinopril 5 mg b.i.d., Glucophage 1000 mg b.i.d., Toprol-XL 50 mg b.i.d., Singulair 10 mg daily, Desyrel 50 mg nightly. PAST MEDICAL AND SURGICAL HISTORY:  1.  Prostate carcinoma diagnosed in , with robotic prostatectomy in 2006, no reoccurrence. 2.  Pulmonary nodules in , with left upper lobectomy, positive for metastatic adenocarcinoma, done at [facility] with no reoccurrence and his cancer followed at [facility] in [place]. 3.  Insulin-dependent diabetes, hemoglobin A1c 7.3.  4.  Severe arthritis. 5.  Hypertension. 6.  Vasectomy. 7.  Neuropathy. 8.  Hyperlipidemia. 9.  Cardiac as above. FAMILY HISTORY:  Father  of CHF with a MI. Mother  of bypass surgery. Sister had a stent. SOCIAL HISTORY:  He is 78years old,  to [place]. He has 3 children. Quit smoking 33 years ago. Retired from "[employer] in [employer] [employer]. Does not smoke, does not drink alcohol. Daughter is a nurse in [place]. His wife has cervical cancer now, undergoing treatment at the [facility]. REVIEW OF SYSTEMS:  Cardiac as above. Other systems reviewed including constitutional, eyes, ears, nose and throat, cardiovascular, respiratory, GI, , musculoskeletal, integumentary, neurologic are negative except for described above. PHYSICAL EXAMINATION:  VITAL SIGNS:  His blood pressure was 130/60 with a heart rate of 68 and regular. Respiratory rate 18. O2 saturation 96%. Weight 201 pounds. GENERAL:  He is a pleasant 77-year-old gentleman. Denied pain. He was oriented to person, place and time. Answered questions appropriately. SKIN:  No unusual skin changes. HEENT:  The pupils are equally round and intact. Mucous membranes were dry. NECK:  No JVD. Good carotid pulses. No carotid bruits. No lymphadenopathy or thyromegaly. CARDIOVASCULAR EXAM:  S1 and S2 were normal.  No S3 or S4. Soft systolic blowing type murmur. No diastolic murmur.   PMI was normal.  No lift, thrust, or pericardial friction rub.  LUNGS:  Quite clear to auscultation and percussion. ABDOMEN:  Soft and nontender. Good bowel sounds. EXTREMITIES:  Good femoral pulses. Good pedal pulses. No pedal edema. Skin was warm and dry. No calf tenderness. Nail beds pink. Good cap refill. PULSES:  Bilateral symmetrical radial, brachial and carotid pulses. No carotid bruits. Good femoral and pedal pulses. NEUROLOGIC EXAM:  Within normal limits. PSYCHIATRIC EXAM:  Within normal limits. LABORATORY DATA:  From 07/10/2021, sodium 139, potassium 4.3, BUN 17, creatinine 0.94, GFR greater than 60. His magnesium was 1.8, calcium was 9.2. Cholesterol 195 with an HDL 32, , triglycerides 434. ALT was 18, AST was 21. Hemoglobin A1c was 7.3. TSH 2.36. Vitamin D 57.8. White count 5.7, hemoglobin 13.5 with a platelet count 745,888. EKG showed sinus rhythm with nonspecific ST changes, which is unchanged from previous EKGs. Chest x-ray was unremarkable. IMPRESSION:  1. Coronary artery disease. 2.  Angina in 12/2017, with chest heaviness and shortness of breath. 3.  Abnormal stress test with a catheterization on 01/17/2018, showing 90% left main trunk, 90% LAD, 95% circumflex, 70% RCA with an EF of 60%. 4.  Open heart surgery on 01/18/2018, by Dr. Chavez Neff with a LIMA to the LAD, a vein graft to the OM, and a vein graft to the acute marginal of the right coronary artery with good end result. 5.  Hypertension. 6.  Hyperlipidemia, well controlled. 7.  Insulin-dependent diabetes, followed by Dr. Rosmery Palacios with hemoglobin A1c 7.3.  8.  Prostate carcinoma with prostatectomy in 06/2006, with left upper lobectomy because of metastatic adenocarcinoma in 2007, no reoccurrence. 9.  Chest pain after his COVID vaccine which has now subsided entirely. PLAN:  1. No change in medication. 2.  If he developed any chest pain or chest discomfort, I would of course want to see him in followup.   Otherwise, we will see him in 1 year.    DISCUSSION:  Mr. Reny Sidhu is under much stress with his wife having cervical cancer treating at the [facility]. He did have some chest pain after his second COVID injection, but this has subsided entirely. He denies any shortness of breath or loss of energy. If he would have any further chest pain, I would want to see him. We would do a Lexiscan. Otherwise, we will plan on seeing him in 1 year. I did not change any of his medications. Thank you very much for allowing me the privilege of seeing Mr. Reny Sidhu. If you have any questions on my thoughts, please do not hesitate to contact me.     Sincerely,        Marian Gaytan    D: 07/12/2021 9:54:41     T: 07/12/2021 9:58:49     [id number]  Job#: [id number]   Doc#: [id number]      <>

## 2021-07-14 NOTE — PROGRESS NOTES
Queen Juan Carlos M.D. [address] [place] [address]  [phone]        2021        Camelia Chowdhury MD  [address] [address]    RE:   Kee Middletonlam  :  1945    Dear Dr. Wiliam Wheeler:    CHIEF COMPLAINT:  1. Coronary artery disease. 2.  Noncardiac chest pain. HISTORY OF PRESENT ILLNESS:  I had the pleasure of seeing Mr. Black Boyd in the office on 2021. He is a pleasant 26-year-old gentleman who developed shortness of breath and chest pain in 2017, with chest tightness. We did a Lexiscan stress test, with inferolateral ischemia. I did a catheterization on 2018, that showed 90% left main trunk, 90% LAD, 95% circumflex, 70% RCA with an EF of 60%. He had bypass surgery on 2018, with a LIMA to the LAD, a vein graft to the OM, and a vein graft to the acute marginal branch of the right coronary artery. He has been under much stress with his wife having cervical cancer and is being treated at the [facility]. He did have a second COVID injection in for approximately 3 weeks afterwards he was having some chest pain and fast heartbeats especially in the morning. This has subsided entirely. He has had no chest pain, shortness of breath, loss of energy at this point. He is very emotional concerning his wife's cancer. His diabetes is under better control with his hemoglobin A1c of 7.3. He has had no hospitalizations or procedures since I last saw him. CARDIAC RISK FACTORS:  Hypertension:  Positive. Hyperlipidemia:  Positive. Other Family Members:  Positive. Diabetes:  Positive. Peripheral Vascular Disease:  Negative. Smoking:  Negative. Bypass Surgery:  Positive.     MEDICATIONS AT THIS TIME:  He is on aspirin 81 mg daily, Lipitor 40 mg daily, vitamin D 50,000 units weekly, NPH 40 units at bedtime, regular insulin sliding scale, Lupron 22.5 mg every 3 months, Xyzal 5 mg daily, lisinopril 5 mg b.i.d., Glucophage 1000 mg b.i.d., Toprol-XL 50 mg b.i.d., Singulair 10 mg daily, Desyrel 50 mg nightly. PAST MEDICAL AND SURGICAL HISTORY:  1.  Prostate carcinoma diagnosed in , with robotic prostatectomy in 2006, no reoccurrence. 2.  Pulmonary nodules in , with left upper lobectomy, positive for metastatic adenocarcinoma, done at [facility] with no reoccurrence and his cancer followed at [facility] in [place]. 3.  Insulin-dependent diabetes, hemoglobin A1c 7.3.  4.  Severe arthritis. 5.  Hypertension. 6.  Vasectomy. 7.  Neuropathy. 8.  Hyperlipidemia. 9.  Cardiac as above. FAMILY HISTORY:  Father  of CHF with a MI. Mother  of bypass surgery. Sister had a stent. SOCIAL HISTORY:  He is 78years old,  to [place]. He has 3 children. Quit smoking 33 years ago. Retired from [employer] in [employer]. Does not smoke, does not drink alcohol. Daughter is a nurse in [place]. His wife has cervical cancer now, undergoing treatment at the [facility]. REVIEW OF SYSTEMS:  Cardiac as above. Other systems reviewed including constitutional, eyes, ears, nose and throat, cardiovascular, respiratory, GI, , musculoskeletal, integumentary, neurologic are negative except for described above. PHYSICAL EXAMINATION:  VITAL SIGNS:  His blood pressure was 130/60 with a heart rate of 68 and regular. Respiratory rate 18. O2 saturation 96%. Weight 201 pounds. GENERAL:  He is a pleasant 59-year-old gentleman. Denied pain. He was oriented to person, place and time. Answered questions appropriately. SKIN:  No unusual skin changes. HEENT:  The pupils are equally round and intact. Mucous membranes were dry. NECK:  No JVD. Good carotid pulses. No carotid bruits. No lymphadenopathy or thyromegaly. CARDIOVASCULAR EXAM:  S1 and S2 were normal.  No S3 or S4. Soft systolic blowing type murmur. No diastolic murmur.   PMI was normal.  No lift, thrust, or pericardial friction rub.  LUNGS:  Quite clear to auscultation and percussion. ABDOMEN:  Soft and nontender. Good bowel sounds. EXTREMITIES:  Good femoral pulses. Good pedal pulses. No pedal edema. Skin was warm and dry. No calf tenderness. Nail beds pink. Good cap refill. PULSES:  Bilateral symmetrical radial, brachial and carotid pulses. No carotid bruits. Good femoral and pedal pulses. NEUROLOGIC EXAM:  Within normal limits. PSYCHIATRIC EXAM:  Within normal limits. LABORATORY DATA:  From 07/10/2021, sodium 139, potassium 4.3, BUN 17, creatinine 0.94, GFR greater than 60. His magnesium was 1.8, calcium was 9.2. Cholesterol 195 with an HDL 32, , triglycerides 434. ALT was 18, AST was 21. Hemoglobin A1c was 7.3. TSH 2.36. Vitamin D 57.8. White count 5.7, hemoglobin 13.5 with a platelet count 029,300. EKG showed sinus rhythm with nonspecific ST changes, which is unchanged from previous EKGs. Chest x-ray was unremarkable. IMPRESSION:  1. Coronary artery disease. 2.  Angina in 12/2017, with chest heaviness and shortness of breath. 3.  Abnormal stress test with a catheterization on 01/17/2018, showing 90% left main trunk, 90% LAD, 95% circumflex, 70% RCA with an EF of 60%. 4.  Open heart surgery on 01/18/2018, by Dr. Claudia Jefferson with a LIMA to the LAD, a vein graft to the OM, and a vein graft to the acute marginal of the right coronary artery with good end result. 5.  Hypertension. 6.  Hyperlipidemia, well controlled. 7.  Insulin-dependent diabetes, followed by Dr. Kristian Mccollum with hemoglobin A1c 7.3.  8.  Prostate carcinoma with prostatectomy in 06/2006, with left upper lobectomy because of metastatic adenocarcinoma in 2007, no reoccurrence. 9.  Chest pain after his COVID vaccine which has now subsided entirely. PLAN:  1. No change in medication. 2.  If he developed any chest pain or chest discomfort, I would of course want to see him in followup.   Otherwise, we will see him in 1 year.    DISCUSSION:  Mr. Glen Ozuna is under much stress with his wife having cervical cancer treating at the [facility]. He did have some chest pain after his second COVID injection, but this has subsided entirely. He denies any shortness of breath or loss of energy. If he would have any further chest pain, I would want to see him. We would do a Lexiscan. Otherwise, we will plan on seeing him in 1 year. I did not change any of his medications. Thank you very much for allowing me the privilege of seeing Mr. Glen Ozuna. If you have any questions on my thoughts, please do not hesitate to contact me.     Sincerely,        Mynor Tucker    D: 07/12/2021 9:54:41     T: 07/12/2021 9:58:49     [id number]  Job#: [id number]   Doc#: [id number]      <>

## 2021-11-04 ENCOUNTER — OFFICE VISIT (OUTPATIENT)
Dept: FAMILY MEDICINE CLINIC | Age: 76
End: 2021-11-04
Payer: MEDICARE

## 2021-11-04 VITALS
WEIGHT: 201 LBS | HEART RATE: 65 BPM | BODY MASS INDEX: 29.77 KG/M2 | SYSTOLIC BLOOD PRESSURE: 138 MMHG | OXYGEN SATURATION: 96 % | HEIGHT: 69 IN | DIASTOLIC BLOOD PRESSURE: 78 MMHG

## 2021-11-04 DIAGNOSIS — I10 ESSENTIAL HYPERTENSION, BENIGN: ICD-10-CM

## 2021-11-04 DIAGNOSIS — E11.40 TYPE 2 DIABETES MELLITUS WITH DIABETIC NEUROPATHY, WITH LONG-TERM CURRENT USE OF INSULIN (HCC): Primary | ICD-10-CM

## 2021-11-04 DIAGNOSIS — Z79.4 TYPE 2 DIABETES MELLITUS WITH DIABETIC NEUROPATHY, WITH LONG-TERM CURRENT USE OF INSULIN (HCC): Primary | ICD-10-CM

## 2021-11-04 DIAGNOSIS — K59.1 FUNCTIONAL DIARRHEA: ICD-10-CM

## 2021-11-04 DIAGNOSIS — E78.00 PURE HYPERCHOLESTEROLEMIA: ICD-10-CM

## 2021-11-04 DIAGNOSIS — G47.09 OTHER INSOMNIA: ICD-10-CM

## 2021-11-04 LAB
CREATININE URINE POCT: NORMAL
MICROALBUMIN/CREAT 24H UR: NORMAL MG/G{CREAT}
MICROALBUMIN/CREAT UR-RTO: NORMAL

## 2021-11-04 PROCEDURE — 4040F PNEUMOC VAC/ADMIN/RCVD: CPT | Performed by: FAMILY MEDICINE

## 2021-11-04 PROCEDURE — 3017F COLORECTAL CA SCREEN DOC REV: CPT | Performed by: FAMILY MEDICINE

## 2021-11-04 PROCEDURE — 2022F DILAT RTA XM EVC RTNOPTHY: CPT | Performed by: FAMILY MEDICINE

## 2021-11-04 PROCEDURE — 3288F FALL RISK ASSESSMENT DOCD: CPT | Performed by: FAMILY MEDICINE

## 2021-11-04 PROCEDURE — 82044 UR ALBUMIN SEMIQUANTITATIVE: CPT | Performed by: FAMILY MEDICINE

## 2021-11-04 PROCEDURE — 1036F TOBACCO NON-USER: CPT | Performed by: FAMILY MEDICINE

## 2021-11-04 PROCEDURE — 99214 OFFICE O/P EST MOD 30 MIN: CPT | Performed by: FAMILY MEDICINE

## 2021-11-04 PROCEDURE — G8427 DOCREV CUR MEDS BY ELIG CLIN: HCPCS | Performed by: FAMILY MEDICINE

## 2021-11-04 PROCEDURE — G8484 FLU IMMUNIZE NO ADMIN: HCPCS | Performed by: FAMILY MEDICINE

## 2021-11-04 PROCEDURE — 1123F ACP DISCUSS/DSCN MKR DOCD: CPT | Performed by: FAMILY MEDICINE

## 2021-11-04 PROCEDURE — 3051F HG A1C>EQUAL 7.0%<8.0%: CPT | Performed by: FAMILY MEDICINE

## 2021-11-04 PROCEDURE — G8417 CALC BMI ABV UP PARAM F/U: HCPCS | Performed by: FAMILY MEDICINE

## 2021-11-04 SDOH — ECONOMIC STABILITY: FOOD INSECURITY: WITHIN THE PAST 12 MONTHS, YOU WORRIED THAT YOUR FOOD WOULD RUN OUT BEFORE YOU GOT MONEY TO BUY MORE.: NEVER TRUE

## 2021-11-04 SDOH — ECONOMIC STABILITY: FOOD INSECURITY: WITHIN THE PAST 12 MONTHS, THE FOOD YOU BOUGHT JUST DIDN'T LAST AND YOU DIDN'T HAVE MONEY TO GET MORE.: NEVER TRUE

## 2021-11-04 ASSESSMENT — ENCOUNTER SYMPTOMS
CONSTIPATION: 0
ABDOMINAL PAIN: 0
SHORTNESS OF BREATH: 0
BLOOD IN STOOL: 0
DIARRHEA: 1
COUGH: 0
NAUSEA: 0
TROUBLE SWALLOWING: 0
FACIAL SWELLING: 0
VOMITING: 0
EYE REDNESS: 0
EYE DISCHARGE: 0

## 2021-11-04 ASSESSMENT — SOCIAL DETERMINANTS OF HEALTH (SDOH): HOW HARD IS IT FOR YOU TO PAY FOR THE VERY BASICS LIKE FOOD, HOUSING, MEDICAL CARE, AND HEATING?: NOT HARD AT ALL

## 2021-11-04 NOTE — PROGRESS NOTES
HPI Notes    Name: Kerry Arredondo  : 1945        Chief Complaint:     Chief Complaint   Patient presents with    Diabetes     Last A1C was 7.0 on 21    Hypertension       History of Present Illness:     Kerry Arredondo is a 78 y.o.  male who presents with Diabetes (Last A1C was 7.0 on 21) and Hypertension      Diabetes  He presents for his follow-up diabetic visit. He has type 2 diabetes mellitus. Pertinent negatives for hypoglycemia include no confusion, dizziness, headaches, pallor or tremors. Pertinent negatives for diabetes include no chest pain and no fatigue. There are no hypoglycemic complications. There are no diabetic complications. Risk factors for coronary artery disease include diabetes mellitus, dyslipidemia, hypertension and male sex. Current diabetic treatment includes insulin injections and oral agent (monotherapy). He is compliant with treatment most of the time. His weight is stable. He is following a generally healthy diet. He participates in exercise weekly. There is no change in his home blood glucose trend. He sees a podiatrist.Eye exam is current. Hypertension  This is a chronic problem. The current episode started more than 1 year ago. The problem is unchanged. The problem is controlled. Pertinent negatives include no chest pain, headaches, malaise/fatigue, palpitations, peripheral edema or shortness of breath. There are no associated agents to hypertension. Risk factors for coronary artery disease include diabetes mellitus and male gender. The current treatment provides significant improvement. There are no compliance problems. Diarrhea   This is a new problem. Episode onset: pt has been having loose stool maybe couple times a week --- so just every once in awhile. Pt has more formed stools too., Pt feels it may be more stress or his coffee. Pt's wife has cancer so that is a stress and pt worries about getting dementia.  The problem has been gradually improving (Pt feels his diarrhea is better since decreasing coffee and down to caffiene free and stopped memory supplement and taking citracel. ). The stool consistency is described as watery. Pertinent negatives include no abdominal pain, chills, coughing, fever, headaches or vomiting. Exacerbated by: coffee -- pt has decreased this and it helps some. Also pt has stress. Pt has tried to stop dairy products ---- no difference. Pt was on a memory supplerment and stopped and diarrhea seemed better after stopping that supplement. There are no known risk factors. Treatments tried: citracel every AM which is helping. The treatment provided moderate relief. Hyperlipidemia  This is a chronic problem. The current episode started more than 1 year ago. Recent lipid tests were reviewed and are normal. Exacerbating diseases include diabetes. He has no history of hypothyroidism. Pertinent negatives include no chest pain or shortness of breath. Current antihyperlipidemic treatment includes diet change. The current treatment provides significant improvement of lipids. Risk factors for coronary artery disease include diabetes mellitus, dyslipidemia, hypertension and male sex. Insomnia - pt is taking the trazodone 50mg nightly. Pt states he takes about 1 -2hrs to fall asleep. But once a sleep he stays asleep ok. Most of the time pt gets 6-7hrs of sleep.      Past Medical History:     Past Medical History:   Diagnosis Date    Allergic rhinitis     Allergist - Dr Storm Patella    Arthritis     CAD (coronary artery disease)     Dr Modesta Arguello [facility]) 2006    Prostate Cancer (Removed prostate)-- Dr Yazmin Maria Diabetic neuropathy [facility])     podiatry - Dr Georgette Casillas Hyperlipidemia     Hypertension     Type II or unspecified type diabetes mellitus without mention of complication, not stated as uncontrolled       Reviewed all health maintenance requirements and ordered appropriate tests  Health Maintenance Due   Topic Date Due    Hepatitis C screen  Never done    DTaP/Tdap/Td vaccine (1 - Tdap) Never done    Shingles Vaccine (1 of 2) Never done    PSA counseling  09/17/2021    Annual Wellness Visit (AWV)  11/03/2021       Past Surgical History:     Past Surgical History:   Procedure Laterality Date    CARDIAC CATHETERIZATION  01/17/2018    Lt Cardiac Cath Dr Sonia Roque  2014    COLONOSCOPY  2013    CYST REMOVAL      LUNG BIOPSY  2007    PROSTATE BIOPSY      TOOTH EXTRACTION  4/2014    VASECTOMY          Medications:       Prior to Admission medications    Medication Sig Start Date End Date Taking?  Authorizing Provider   metoprolol succinate (TOPROL XL) 50 MG extended release tablet TAKE 1 TABLET BY MOUTH TWICE DAILY 5/17/21  Yes Bernabe Roland MD   levocetirizine (XYZAL) 5 MG tablet  5/2/21  Yes Historical Provider, MD   traZODone (DESYREL) 50 MG tablet Take 1 tablet by mouth nightly 5/4/21  Yes Randall Obrien MD   lisinopril (PRINIVIL;ZESTRIL) 5 MG tablet TAKE 1 TABLET BY MOUTH TWICE DAILY  Patient taking differently: Take 5 mg by mouth Taking 2 tabs by mouth twice a day 3/9/21  Yes Bernabe Roland MD   Multiple Vitamins-Minerals (MULTIVITAL PO) Take by mouth   Yes Historical Provider, MD   ergocalciferol (ERGOCALCIFEROL) 1.25 MG (62397 UT) capsule Take 50,000 Units by mouth once a week   Yes Historical Provider, MD   insulin NPH (HUMULIN N) 100 UNIT/ML injection vial Inject 26 U at bedtime  Patient taking differently: Inject 40 U at bedtime 9/6/18  Yes Randall Obrien MD   insulin regular (HUMULIN R) 100 UNIT/ML injection Inject into the skin See Admin Instructions 28 U in the AM, 12 at lunch and 20 at supper   Yes Historical Provider, MD   metFORMIN (GLUCOPHAGE) 1000 MG tablet Take 1 tablet by mouth 2 times daily (with meals) 12/4/17  Yes Randall Obrien MD   EPINEPHrine (EPIPEN) 0.3 MG/0.3ML SOAJ injection Inject 0.3 mg into the muscle 8/5/17  Yes Historical Provider, MD   Calcium Carb-Cholecalciferol 600-500 MG-UNIT CAPS Take 1 capsule by mouth 2 times daily   Yes Historical Provider, MD   Magnesium Oxide 500 MG TABS Take 1 tablet by mouth daily   Yes Historical Provider, MD   aspirin 81 MG EC tablet Take 81 mg by mouth daily   Yes Historical Provider, MD   NONFORMULARY Indications: Neuravite takes 1 daily B1, B6, B12, alpha lipoic acid in each capsule. Yes Historical Provider, MD   leuprolide (LUPRON) 22.5 MG injection Inject 22.5 mg into the muscle. Every three months   Yes Historical Provider, MD   bicalutamide (CASODEX) 50 MG tablet Take 50 mg by mouth daily    Yes Historical Provider, MD   Loratadine (CLARITIN PO) Take by mouth  Patient not taking: Reported on 11/4/2021    Historical Provider, MD   Glucose Blood (BLOOD GLUCOSE TEST STRIPS) STRP Use to test blood sugar twice daily as needed 12/4/17   Isael Humphrey MD   Lancets MISC Use to test blood sugars twice daily as needed 9/20/17   Isael Humphrey MD   Blood Glucose Monitoring Suppl (BLOOD GLUCOSE METER) KIT Use to test blood sugar twice daily as needed 5/14/15   Isael Humphrey MD        Allergies:       Bee venom and Januvia [sitagliptin phosphate]    Social History:     Tobacco:    reports that he quit smoking about 36 years ago. He has a 20.00 pack-year smoking history. He has never used smokeless tobacco.  Alcohol:      reports no history of alcohol use. Drug Use:  reports no history of drug use. Family History:     Family History   Problem Relation Age of Onset   Awad Diabetes Mother     Dementia Mother     Heart Disease Father     Diabetes Sister     Diabetes Brother        Review of Systems:       Review of Systems   Constitutional: Negative for chills, fatigue, fever and malaise/fatigue. HENT: Negative for facial swelling and trouble swallowing. Eyes: Negative for discharge, redness and visual disturbance. Respiratory: Negative for cough and shortness of breath. Cardiovascular: Negative for chest pain, palpitations and leg swelling. Gastrointestinal: Positive for diarrhea. Negative for abdominal pain, blood in stool, constipation, nausea and vomiting. Genitourinary: Negative for dysuria and hematuria. Musculoskeletal: Negative for joint swelling and neck stiffness. Skin: Negative for pallor and rash. Neurological: Negative for dizziness, tremors, light-headedness and headaches. Psychiatric/Behavioral: Negative for confusion and sleep disturbance. Physical Exam:     Physical Exam  Vitals reviewed. Constitutional:       General: He is not in acute distress. Appearance: Normal appearance. He is well-developed. He is not ill-appearing. HENT:      Head: Normocephalic and atraumatic. Eyes:      General:         Right eye: No discharge. Left eye: No discharge. Conjunctiva/sclera: Conjunctivae normal.   Neck:      Thyroid: No thyromegaly. Vascular: No carotid bruit. Cardiovascular:      Rate and Rhythm: Normal rate and regular rhythm. Heart sounds: No murmur heard. Pulmonary:      Effort: Pulmonary effort is normal. No respiratory distress. Breath sounds: Normal breath sounds. No wheezing. Abdominal:      General: There is no distension. Palpations: Abdomen is soft. Tenderness: There is no abdominal tenderness. Musculoskeletal:      Cervical back: Neck supple. Right lower leg: No edema. Left lower leg: No edema. Lymphadenopathy:      Cervical: No cervical adenopathy. Skin:     Findings: No erythema or rash. Comments: microfilament sensation intact and no lesions or sores on feet bilateral. +2 DP pulse bilateral.     Neurological:      Mental Status: He is alert and oriented to person, place, and time.    Psychiatric:         Mood and Affect: Mood normal.         Behavior: Behavior normal.         Vitals:  /78   Pulse 65   Ht 5' 9\" (1.753 m)   Wt 201 lb (91.2 kg)   SpO2 96%   BMI 29.68 kg/m²       Data:     Lab Results   Component Value Date    NA 139 07/10/2021    K 4.3 07/10/2021     07/10/2021    CO2 27 07/10/2021    BUN 17 07/10/2021    CREATININE 0.94 07/10/2021    GLUCOSE 158 07/10/2021    PROT 6.7 07/10/2021    LABALBU 4.3 07/10/2021    BILITOT 0.45 07/10/2021    ALKPHOS 53 07/10/2021    AST 21 07/10/2021    ALT 18 07/10/2021     Lab Results   Component Value Date    WBC 5.1 07/10/2021    RBC 4.13 07/10/2021    HGB 13.5 07/10/2021    HCT 39.0 07/10/2021    MCV 94.5 07/10/2021    MCH 32.7 07/10/2021    MCHC 34.6 07/10/2021    RDW 14.0 07/10/2021     07/10/2021    MPV NOT REPORTED 07/10/2021     Lab Results   Component Value Date    TSH 2.36 07/10/2021     Lab Results   Component Value Date    CHOL 195 07/10/2021    CHOL 123 08/03/2017    HDL 32 07/10/2021    PSA 0.03 09/17/2020    LABA1C 7.3 07/10/2021          Assessment/Plan:        1. Type 2 diabetes mellitus with diabetic neuropathy, with long-term current use of insulin (HCC)  F/U 6mos, all labs per Cardio and endo and oncology . Do daily foot checks and yearly eye exams  Stable on insulin and metformin   - POCT microalbumin  - [facility] DIABETES FOOT EXAM    2. Essential hypertension, benign  Stable on on toprol and zestril    3. Functional diarrhea  Stable and pt is doing better on the citracel. Pt has more stress so had pt to think about doing a medication for stress. Pt will think about it    4. Pure hypercholesterolemia  No longer taking a statin and sees Cardiology    5. Other insomnia  Stable on the Trazodone 50mg     Needs medicare wellness    [facility] counseling on the following healthy behaviors: nutrition and exercise  Reviewed prior labs and health maintenance  Continue current medications, diet and exercise. Discussed use, benefit, and side effects of prescribed medications. Barriers to medication compliance addressed. Patient given educational materials - see patient instructions  Was a self-tracking handout given in paper form or via [employer]?  Yes    Requested Prescriptions      No prescriptions requested or ordered in this encounter       All patient questions answered. Patient voiced understanding. Quality Measures    Body mass index is 29.68 kg/m². Elevated. Weight control planned discussed Healthy diet and regular exercise. BP: 138/78. Blood pressure is normal. Treatment plan consists of No treatment change needed. Fall Risk 11/4/2021 11/2/2020 8/22/2019 11/27/2018 12/4/2017 9/7/2016 5/14/2015   2 or more falls in past year? no yes no no no no no   Fall with injury in past year? no no no no no no no     The patient does not have a history of falls. I did not - not indicated , complete a risk assessment for falls. A plan of care for falls No Treatment plan indicated    Lab Results   Component Value Date    LDLCALC 72 08/03/2017    LDLCHOLESTEROL      07/10/2021    LDLDIRECT 104 (H) 07/10/2021    (goal LDL reduction with dx if diabetes is 50% LDL reduction)    PHQ Scores 5/4/2021 11/2/2020 8/22/2019 4/9/2019 11/27/2018 12/4/2017 9/7/2016   PHQ2 Score 1 2 0 0 1 0 0   PHQ9 Score 1 2 0 0 1 0 0     Interpretation of Total Score Depression Severity: 1-4 = Minimal depression, 5-9 = Mild depression, 10-14 = Moderate depression, 15-19 = Moderately severe depression, 20-27 = Severe depression        Return in about 2 weeks (around 11/18/2021).       Electronically signed by Nadege Beard MD on 11/4/2021 at 11:17 AM

## 2021-11-23 ENCOUNTER — OFFICE VISIT (OUTPATIENT)
Dept: FAMILY MEDICINE CLINIC | Age: 76
End: 2021-11-23
Payer: MEDICARE

## 2021-11-23 VITALS
SYSTOLIC BLOOD PRESSURE: 156 MMHG | BODY MASS INDEX: 29.47 KG/M2 | HEART RATE: 80 BPM | DIASTOLIC BLOOD PRESSURE: 80 MMHG | WEIGHT: 199 LBS | OXYGEN SATURATION: 98 % | HEIGHT: 69 IN

## 2021-11-23 DIAGNOSIS — Z00.00 ROUTINE GENERAL MEDICAL EXAMINATION AT A HEALTH CARE FACILITY: Primary | ICD-10-CM

## 2021-11-23 PROCEDURE — G0439 PPPS, SUBSEQ VISIT: HCPCS | Performed by: FAMILY MEDICINE

## 2021-11-23 PROCEDURE — 1123F ACP DISCUSS/DSCN MKR DOCD: CPT | Performed by: FAMILY MEDICINE

## 2021-11-23 PROCEDURE — G8484 FLU IMMUNIZE NO ADMIN: HCPCS | Performed by: FAMILY MEDICINE

## 2021-11-23 PROCEDURE — 3017F COLORECTAL CA SCREEN DOC REV: CPT | Performed by: FAMILY MEDICINE

## 2021-11-23 PROCEDURE — 4040F PNEUMOC VAC/ADMIN/RCVD: CPT | Performed by: FAMILY MEDICINE

## 2021-11-23 RX ORDER — TRAZODONE HYDROCHLORIDE 100 MG/1
100 TABLET ORAL NIGHTLY
Qty: 90 TABLET | Refills: 1 | Status: SHIPPED | OUTPATIENT
Start: 2021-11-23 | End: 2022-05-31

## 2021-11-23 ASSESSMENT — PATIENT HEALTH QUESTIONNAIRE - PHQ9
2. FEELING DOWN, DEPRESSED OR HOPELESS: 1
SUM OF ALL RESPONSES TO PHQ QUESTIONS 1-9: 1
SUM OF ALL RESPONSES TO PHQ QUESTIONS 1-9: 1
SUM OF ALL RESPONSES TO PHQ9 QUESTIONS 1 & 2: 1
1. LITTLE INTEREST OR PLEASURE IN DOING THINGS: 0
SUM OF ALL RESPONSES TO PHQ QUESTIONS 1-9: 1

## 2021-11-23 ASSESSMENT — LIFESTYLE VARIABLES: HOW OFTEN DO YOU HAVE A DRINK CONTAINING ALCOHOL: 0

## 2021-11-23 NOTE — PATIENT INSTRUCTIONS
Personalized Preventive Plan for Suzanne Malin - 11/23/2021  Medicare offers a range of preventive health benefits. Some of the tests and screenings are paid in full while other may be subject to a deductible, co-insurance, and/or copay. Some of these benefits include a comprehensive review of your medical history including lifestyle, illnesses that may run in your family, and various assessments and screenings as appropriate. After reviewing your medical record and screening and assessments performed today your provider may have ordered immunizations, labs, imaging, and/or referrals for you. A list of these orders (if applicable) as well as your Preventive Care list are included within your After Visit Summary for your review. Other Preventive Recommendations:    · A preventive eye exam performed by an eye specialist is recommended every 1-2 years to screen for glaucoma; cataracts, macular degeneration, and other eye disorders. · A preventive dental visit is recommended every 6 months. · Try to get at least 150 minutes of exercise per week or 10,000 steps per day on a pedometer . · Order or download the FREE \"Exercise & Physical Activity: Your Everyday Guide\" from [employer]. Call 6-640.836.6980 or search [employer] online. · You need 1293-1588 mg of calcium and 6886-0148 IU of vitamin D per day. It is possible to meet your calcium requirement with diet alone, but a vitamin D supplement is usually necessary to meet this goal.  · When exposed to the sun, use a sunscreen that protects against both UVA and UVB radiation with an SPF of 30 or greater. Reapply every 2 to 3 hours or after sweating, drying off with a towel, or swimming. · Always wear a seat belt when traveling in a car. Always wear a helmet when riding a bicycle or motorcycle.

## 2021-11-23 NOTE — PROGRESS NOTES
Medicare Annual Wellness Visit  Name: Payton Lai Date: 2021   MRN: [id number] Sex: Male   Age: 78 y.o. Ethnicity: Non- / Non    : 1945 Race: White (non-)      Lynn Aparicio is here for Medicare AWV    Screenings for behavioral, psychosocial and functional/safety risks, and cognitive dysfunction are all negative except as indicated below. These results, as well as other patient data from the [address] form, are documented in Flowsheets linked to this Encounter. Allergies   Allergen Reactions    Bee Venom Hives    Januvia [Sitagliptin Phosphate]      Numbness       Prior to Visit Medications    Medication Sig Taking?  Authorizing Provider   traZODone (DESYREL) 100 MG tablet Take 1 tablet by mouth nightly Yes Jabier Adams MD   metoprolol succinate (TOPROL XL) 50 MG extended release tablet TAKE 1 TABLET BY MOUTH TWICE DAILY Yes Hugo Nicholas MD   levocetirizine (XYZAL) 5 MG tablet  Yes Historical Provider, MD   lisinopril (PRINIVIL;ZESTRIL) 5 MG tablet TAKE 1 TABLET BY MOUTH TWICE DAILY  Patient taking differently: Take 5 mg by mouth Taking 2 tabs by mouth twice a day Yes Hugo Nicholas MD   Multiple Vitamins-Minerals (MULTIVITAL PO) Take by mouth Yes Historical Provider, MD   ergocalciferol (ERGOCALCIFEROL) 1.25 MG (28645 UT) capsule Take 50,000 Units by mouth once a week Yes Historical Provider, MD   insulin NPH (HUMULIN N) 100 UNIT/ML injection vial Inject 26 U at bedtime  Patient taking differently: Inject 40 U at bedtime Yes Jabier Adams MD   insulin regular (HUMULIN R) 100 UNIT/ML injection Inject into the skin See Admin Instructions 28 U in the AM, 12 at lunch and 20 at supper Yes Historical Provider, MD   metFORMIN (GLUCOPHAGE) 1000 MG tablet Take 1 tablet by mouth 2 times daily (with meals) Yes Jabier Adams MD   Calcium Carb-Cholecalciferol 600-500 MG-UNIT CAPS Take 1 capsule by mouth 2 times daily Yes Historical Provider, MD   Magnesium Oxide 500 MG TABS Take 1 tablet by mouth daily Yes Historical Provider, MD   aspirin 81 MG EC tablet Take 81 mg by mouth daily Yes Historical Provider, MD   NONFORMULARY Indications: Neuravite takes 1 daily B1, B6, B12, alpha lipoic acid in each capsule. Yes Historical Provider, MD   leuprolide (LUPRON) 22.5 MG injection Inject 22.5 mg into the muscle.  Every three months Yes Historical Provider, MD   bicalutamide (CASODEX) 50 MG tablet Take 50 mg by mouth daily  Yes Historical Provider, MD   Glucose Blood (BLOOD GLUCOSE TEST STRIPS) STRP Use to test blood sugar twice daily as needed  Nadege Beard MD   EPINEPHrine (EPIPEN) 0.3 MG/0.3ML SOAJ injection Inject 0.3 mg into the muscle  Historical Provider, MD   Lancets MISC Use to test blood sugars twice daily as needed  Nadege Beard MD   Blood Glucose Monitoring Suppl (BLOOD GLUCOSE METER) KIT Use to test blood sugar twice daily as needed  Nadege Beard MD       Past Medical History:   Diagnosis Date    Allergic rhinitis     Allergist - Dr Freida Farmer    Arthritis     CAD (coronary artery disease)     Dr Zelalem Thompson [facility]) 2006    Prostate Cancer (Removed prostate)-- Dr Pooja Gant Diabetic neuropathy [facility])     podiatry - Dr Roshan Morocho Hyperlipidemia     Hypertension     Type II or unspecified type diabetes mellitus without mention of complication, not stated as uncontrolled        Past Surgical History:   Procedure Laterality Date    CARDIAC CATHETERIZATION  01/17/2018    Lt Cardiac Cath Dr Natalie Puri  2014    COLONOSCOPY  2013    CYST REMOVAL      LUNG BIOPSY  2007    PROSTATE BIOPSY      TOOTH EXTRACTION  4/2014    VASECTOMY         Family History   Problem Relation Age of Onset    Diabetes Mother     Dementia Mother     Heart Disease Father     Diabetes Sister     Diabetes Brother        CareTeam (Including outside providers/suppliers regularly involved in providing care):   Patient Care Team:  Nadege Beard MD as PCP - Reynaldo Bolden MD as PCP - [facility]  Janelle Albarado MD as Consulting Physician (Hematology and Oncology)  Kimberly Hobbs MD as Consulting Physician (Cardiology)  Enma Gomes MD as Consulting Physician (General Surgery)    Wt Readings from Last 3 Encounters:   11/23/21 199 lb (90.3 kg)   11/04/21 201 lb (91.2 kg)   07/12/21 201 lb (91.2 kg)     Vitals:    11/23/21 1341 11/23/21 1352   BP: (!) 160/82 (!) 156/80   Site: Right Upper Arm Left Upper Arm   Cuff Size: Medium Adult Medium Adult   Pulse: 80    SpO2: 98%    Weight: 199 lb (90.3 kg)    Height: 5' 9\" (1.753 m)      Body mass index is 29.39 kg/m². Based upon direct observation of the patient, evaluation of cognition reveals recent and remote memory intact. General Appearance: alert and oriented to person, place and time, well-developed and well nourished and in no acute distress  Skin: warm and dry, no rash or erythema  Head: normocephalic and atraumatic  Eyes: pupils equal, round, and reactive to light, extraocular eye movements intact and conjunctivae normal  ENT: bilateral tympanic membrane normal, bilateral external ear normal and bilateral ear canal normal  Neck: neck supple and non tender without mass, no thyromegaly or thyroid nodules, no cervical lymphadenopathy   Pulmonary/Chest: clear to auscultation bilaterally- no wheezes, rales or rhonchi, normal air movement, no respiratory distress  Cardiovascular: normal rate, normal S1 and S2, no gallops, intact distal pulses and no carotid bruits  Abdomen: soft, non-tender, non-distended, normal bowel sounds, no masses or organomegaly  Extremities: no cyanosis and no clubbing  Musculoskeletal: normal range of motion, no joint swelling, deformity or tenderness  Neurologic: gait and coordination normal and speech normal    Patient's complete Health Risk Assessment and screening values have been reviewed and are found in Flowsheets.  The following problems were reviewed today and where indicated follow up appointments were made and/or referrals ordered. Positive Risk Factor Screenings with Interventions:          General Health and ACP:  General  In general, how would you say your health is?: Good  In the past 7 days, have you experienced any of the following? New or Increased Pain, New or Increased Fatigue, Loneliness, Social Isolation, Stress or Anger?: (!) New or Increased Fatigue (Pt states he doesn't sleep well at night plus he's out of trazodone. Pt thinks this is causing him to feel extra fatigue.)  Do you get the social and emotional support that you need?: (!) No  Do you have a Living Will?: Yes  Advance Directives     Power of [address] [place] [address] Will ACP-Advance Directive ACP-Power of     Not on File Not on File Not on File Not on File      General Health Risk Interventions:  · pt has difficulty falling asleep at night. Pt taking the trazodone 50mg and would like to increase the trazodone to 100mg at night.          Personalized Preventive Plan   Current Health Maintenance Status  Immunization History   Administered Date(s) Administered    COVID-19, Pfizer, PF, 30mcg/0.3mL 02/10/2021, 03/14/2021, 08/26/2021    Influenza 11/13/2013    Influenza Vaccine, unspecified formulation 11/13/2013, 12/04/2017, 12/19/2018    Influenza Virus Vaccine 11/17/2014, 11/16/2015    Influenza, High-dose, Bryanna Cuna, 65 yrs +, IM (Fluzone) 10/04/2021    Influenza, Quadv, IM, PF (6 mo and older Fluzone, Flulaval, Fluarix, and 3 yrs and older Afluria) 11/22/2016, 12/04/2017, 12/19/2018, 11/04/2019, 11/02/2020    Pneumococcal Conjugate 13-valent ([id number]) 11/16/2015    Pneumococcal Polysaccharide ([id number]) 11/01/2007, 11/13/2013        Health Maintenance   Topic Date Due    Hepatitis C screen  Never done    DTaP/Tdap/Td vaccine (1 - Tdap) Never done    Shingles Vaccine (1 of 2) Never done    PSA counseling  09/17/2021    Annual Wellness Visit (AWV)  11/03/2021    Diabetic retinal exam  07/06/2022    A1C test (Diabetic or Prediabetic)  07/10/2022    Lipid screen  07/10/2022    Potassium monitoring  07/10/2022    Creatinine monitoring  07/10/2022    Diabetic foot exam  11/04/2022    Diabetic microalbuminuria test  11/04/2022    Colon cancer screen colonoscopy  10/14/2024    Flu vaccine  Completed    Pneumococcal 65+ years Vaccine  Completed    COVID-19 Vaccine  Completed    AAA screen  Completed    Hepatitis A vaccine  Aged Out    Hib vaccine  Aged Out    Meningococcal (ACWY) vaccine  Aged Out     Recommendations for Pint Please Due: see orders and patient instructions/AVS.  . Recommended screening schedule for the next 5-10 years is provided to the patient in written form: see Patient Kameron Marin was seen today for medicare aw. Diagnoses and all orders for this visit:    Routine general medical examination at a health care facility  Try to increase the trazodone from 50mg to 100mg   Other orders  -     traZODone (DESYREL) 100 MG tablet;  Take 1 tablet by mouth nightly

## 2021-12-30 ENCOUNTER — TELEPHONE (OUTPATIENT)
Dept: FAMILY MEDICINE CLINIC | Age: 76
End: 2021-12-30

## 2021-12-30 DIAGNOSIS — Z99.89 OSA ON CPAP: Primary | ICD-10-CM

## 2021-12-30 DIAGNOSIS — G47.33 OSA ON CPAP: Primary | ICD-10-CM

## 2021-12-30 NOTE — TELEPHONE ENCOUNTER
I called and spoke to Ji Hassan about his specific accessory needs. He needs tubing and a full face mask.     [address] [phone]

## 2021-12-30 NOTE — TELEPHONE ENCOUNTER
Patient is asking for CPAP supplies to be ordered and sent to [facility]       Phone: [phone]        He needs a new mask an tubing

## 2022-05-31 RX ORDER — TRAZODONE HYDROCHLORIDE 100 MG/1
100 TABLET ORAL NIGHTLY
Qty: 90 TABLET | Refills: 1 | Status: SHIPPED | OUTPATIENT
Start: 2022-05-31 | End: 2022-08-02

## 2022-05-31 NOTE — TELEPHONE ENCOUNTER
Last OV: 11/23/2021 AWV  Last RX:    Next scheduled apt: Visit date not found        surescript requesting a refill

## 2022-06-08 DIAGNOSIS — I10 ESSENTIAL (PRIMARY) HYPERTENSION: ICD-10-CM

## 2022-06-08 RX ORDER — METOPROLOL SUCCINATE 50 MG/1
TABLET, EXTENDED RELEASE ORAL
Qty: 180 TABLET | Refills: 3 | Status: SHIPPED | OUTPATIENT
Start: 2022-06-08

## 2022-07-07 ENCOUNTER — HOSPITAL ENCOUNTER (OUTPATIENT)
Age: 77
Discharge: HOME | End: 2022-07-07
Payer: MEDICARE

## 2022-07-07 ENCOUNTER — HOSPITAL ENCOUNTER (OUTPATIENT)
Age: 77
Discharge: HOME | End: 2022-07-09
Payer: MEDICARE

## 2022-07-07 ENCOUNTER — HOSPITAL ENCOUNTER (OUTPATIENT)
Dept: GENERAL RADIOLOGY | Age: 77
Discharge: HOME | End: 2022-07-09
Payer: MEDICARE

## 2022-07-07 DIAGNOSIS — E55.9 VITAMIN D DEFICIENCY DISEASE: ICD-10-CM

## 2022-07-07 DIAGNOSIS — Z79.4 TYPE 2 DIABETES MELLITUS WITH DIABETIC NEUROPATHY, WITH LONG-TERM CURRENT USE OF INSULIN (HCC): ICD-10-CM

## 2022-07-07 DIAGNOSIS — E11.40 TYPE 2 DIABETES MELLITUS WITH DIABETIC NEUROPATHY, WITH LONG-TERM CURRENT USE OF INSULIN (HCC): ICD-10-CM

## 2022-07-07 DIAGNOSIS — I25.810 CORONARY ARTERY DISEASE INVOLVING AUTOLOGOUS VEIN CORONARY BYPASS GRAFT WITHOUT ANGINA PECTORIS: ICD-10-CM

## 2022-07-07 DIAGNOSIS — E78.00 PURE HYPERCHOLESTEROLEMIA: ICD-10-CM

## 2022-07-07 DIAGNOSIS — I10 ESSENTIAL (PRIMARY) HYPERTENSION: ICD-10-CM

## 2022-07-07 LAB
ABSOLUTE EOS #: 0.2 K/UL (ref 0–0.4)
ABSOLUTE LYMPH #: 1.5 K/UL (ref 1–4.8)
ABSOLUTE MONO #: 0.5 K/UL (ref 0–1)
ALBUMIN SERPL-MCNC: 4.2 G/DL (ref 3.5–5.2)
ALP BLD-CCNC: 56 U/L (ref 40–129)
ALT SERPL-CCNC: 17 U/L (ref 5–41)
ANION GAP SERPL CALCULATED.3IONS-SCNC: 12 MMOL/L (ref 9–17)
AST SERPL-CCNC: 20 U/L
BASOPHILS # BLD: 1 % (ref 0–2)
BASOPHILS ABSOLUTE: 0 K/UL (ref 0–0.2)
BILIRUB SERPL-MCNC: 0.61 MG/DL (ref 0.3–1.2)
BUN BLDV-MCNC: 16 MG/DL (ref 8–23)
BUN/CREAT BLD: 17 (ref 9–20)
CALCIUM SERPL-MCNC: 9.4 MG/DL (ref 8.6–10.4)
CHLORIDE BLD-SCNC: 98 MMOL/L (ref 98–107)
CHOLESTEROL/HDL RATIO: 5.2
CHOLESTEROL: 171 MG/DL
CO2: 26 MMOL/L (ref 20–31)
CREAT SERPL-MCNC: 0.93 MG/DL (ref 0.7–1.2)
DIFFERENTIAL TYPE: YES
EOSINOPHILS RELATIVE PERCENT: 3 % (ref 0–5)
GFR AFRICAN AMERICAN: >60 ML/MIN
GFR NON-AFRICAN AMERICAN: >60 ML/MIN
GFR SERPL CREATININE-BSD FRML MDRD: ABNORMAL ML/MIN/{1.73_M2}
GLUCOSE BLD-MCNC: 258 MG/DL (ref 70–99)
HCT VFR BLD CALC: 40.2 % (ref 41–53)
HDLC SERPL-MCNC: 33 MG/DL
HEMOGLOBIN: 13.8 G/DL (ref 13.5–17.5)
LDL CHOLESTEROL: 91 MG/DL (ref 0–130)
LYMPHOCYTES # BLD: 28 % (ref 13–44)
MAGNESIUM: 1.7 MG/DL (ref 1.6–2.6)
MCH RBC QN AUTO: 32.6 PG (ref 26–34)
MCHC RBC AUTO-ENTMCNC: 34.3 G/DL (ref 31–37)
MCV RBC AUTO: 95.2 FL (ref 80–100)
MONOCYTES # BLD: 9 % (ref 5–9)
PATIENT FASTING?: YES
PDW BLD-RTO: 13.3 % (ref 12.1–15.2)
PLATELET # BLD: 204 K/UL (ref 140–450)
POTASSIUM SERPL-SCNC: 4.9 MMOL/L (ref 3.7–5.3)
RBC # BLD: 4.23 M/UL (ref 4.5–5.9)
SEG NEUTROPHILS: 59 % (ref 39–75)
SEGMENTED NEUTROPHILS ABSOLUTE COUNT: 3.1 K/UL (ref 2.1–6.5)
SODIUM BLD-SCNC: 136 MMOL/L (ref 135–144)
TOTAL PROTEIN: 6.7 G/DL (ref 6.4–8.3)
TRIGL SERPL-MCNC: 233 MG/DL
TSH SERPL DL<=0.05 MIU/L-ACNC: 1.45 UIU/ML (ref 0.3–5)
VITAMIN D 25-HYDROXY: 69.3 NG/ML
WBC # BLD: 5.2 K/UL (ref 3.5–11)

## 2022-07-07 PROCEDURE — 83735 ASSAY OF MAGNESIUM: CPT

## 2022-07-07 PROCEDURE — 80061 LIPID PANEL: CPT

## 2022-07-07 PROCEDURE — 71046 X-RAY EXAM CHEST 2 VIEWS: CPT

## 2022-07-07 PROCEDURE — 84443 ASSAY THYROID STIM HORMONE: CPT

## 2022-07-07 PROCEDURE — 93005 ELECTROCARDIOGRAM TRACING: CPT

## 2022-07-07 PROCEDURE — 36415 COLL VENOUS BLD VENIPUNCTURE: CPT

## 2022-07-07 PROCEDURE — 85025 COMPLETE CBC W/AUTO DIFF WBC: CPT

## 2022-07-07 PROCEDURE — 82306 VITAMIN D 25 HYDROXY: CPT

## 2022-07-07 PROCEDURE — 80053 COMPREHEN METABOLIC PANEL: CPT

## 2022-07-11 ENCOUNTER — OFFICE VISIT (OUTPATIENT)
Dept: CARDIOLOGY CLINIC | Age: 77
End: 2022-07-11
Payer: MEDICARE

## 2022-07-11 VITALS
WEIGHT: 194 LBS | BODY MASS INDEX: 28.65 KG/M2 | HEART RATE: 76 BPM | DIASTOLIC BLOOD PRESSURE: 60 MMHG | OXYGEN SATURATION: 96 % | SYSTOLIC BLOOD PRESSURE: 140 MMHG

## 2022-07-11 DIAGNOSIS — R06.02 SOB (SHORTNESS OF BREATH): ICD-10-CM

## 2022-07-11 DIAGNOSIS — R07.9 CHEST PAIN, UNSPECIFIED TYPE: Primary | ICD-10-CM

## 2022-07-11 PROCEDURE — G8417 CALC BMI ABV UP PARAM F/U: HCPCS | Performed by: INTERNAL MEDICINE

## 2022-07-11 PROCEDURE — 1036F TOBACCO NON-USER: CPT | Performed by: INTERNAL MEDICINE

## 2022-07-11 PROCEDURE — 1123F ACP DISCUSS/DSCN MKR DOCD: CPT | Performed by: INTERNAL MEDICINE

## 2022-07-11 PROCEDURE — 3017F COLORECTAL CA SCREEN DOC REV: CPT | Performed by: INTERNAL MEDICINE

## 2022-07-11 PROCEDURE — G8428 CUR MEDS NOT DOCUMENT: HCPCS | Performed by: INTERNAL MEDICINE

## 2022-07-11 PROCEDURE — 99214 OFFICE O/P EST MOD 30 MIN: CPT | Performed by: INTERNAL MEDICINE

## 2022-07-11 RX ORDER — ISOSORBIDE MONONITRATE 30 MG/1
30 TABLET, EXTENDED RELEASE ORAL DAILY
Qty: 30 TABLET | Refills: 11 | Status: SHIPPED | OUTPATIENT
Start: 2022-07-11 | End: 2022-08-02

## 2022-07-11 NOTE — PROGRESS NOTES
Ov Dr. Adriane Gonzalez for one year follow up   Did not bring list or bottles of medications  No hospitalizations/procedures/er visits  C/o tightness and sob in chest with activity   A lot going on x few months   Wife just passed away July 1 st    \"I just buried her July 3\"  Her sister is staying with him right now   Until appt across street comes open   C/o edema   Dtg is a nurse working in [place] now     [employer] up for Mike Jeyson stress test and echo     Will follow up in 3-4 weeks to review testing     Will add Imdur 30 mg one tablet daily

## 2022-07-11 NOTE — LETTER
Neisha Thomas M.D. [address] [place] [address]  [phone]        2022        Sid Yancey MD  [address] [address]    RE:   Prem Ross  :  1945    Dear Dr. David Marie:    CHIEF COMPLAINT:  1. Chest pain with activity consistent with angina, new in the last three to four months. 2.  Coronary artery disease status post bypass surgery on 2018, with a LIMA to the LAD, a vein graft to the OM and a vein graft to the right coronary artery. HISTORY OF PRESENT ILLNESS:  I had the pleasure of seeing Mr. Felicity Lombard in the office on 2022. He is a pleasant 74-year-old gentleman who unfortunately suffered the death of his wife on 2022; his sister is staying with him now. He is waiting to live in [facility]. He of course was very broken during this visit. She did have cervical cancer for over a year and deteriorated mentally as well as physically. He does have a significant cardiac history. He developed shortness of breath and chest pain in 2017. We did a Lexiscan stress test that showed inferolateral ischemia and I did a catheterization on 2018 that showed 90% left main trunk, 90% LAD, 95% circumflex, 70% RCA, with an EF of 60%. He had bypass surgery on 2018, with a LIMA to the LAD, a vein graft to the OM and a vein graft to the acute marginal branch of the right coronary artery. He has had no hospitalizations or procedures. However, he has developed chest tightness and shortness of breath in his chest with activity. It started several months ago. He initially thought that it was due to anxiety and stress with his wife. However, it did start before she acutely deteriorated. He does have edema in both lower extremities, which is an old finding. He has had no PND or orthopnea. Denies any palpitations. He has had no syncope or near syncope. He has also had no resting discomfort. He attempted to do some yard work yesterday and had to sit because of chest heaviness and tightness. CARDIAC RISK FACTORS:  Hypertension:  Positive. Hyperlipidemia:  Positive. Other Family Members:  Positive. Diabetes:  Positive. Peripheral Vascular Disease:  Negative. Smoking:  Negative. Bypass Surgery:  Positive. MEDICATIONS AT THIS TIME:  He is on aspirin 81 mg daily; vitamin D 50,000 units weekly; NPH (Humulin N) 40 units at bedtime; regular insulin 28 in the morning, 12 at lunch, 20 at supper; Lupron 22.5 injections every three months; Xyzal 5 mg daily; lisinopril 5 mg two tablets b.i.d.; Glucophage 1000 mg b.i.d.; Toprol-XL 50 mg b.i.d.; trazodone 100 mg nightly. PAST MEDICAL AND SURGICAL HISTORY:  1.  Prostate carcinoma diagnosed in . 2.  Robotic prostatectomy in 2006, no reoccurrence. 3.  Pulmonary nodules in , with left upper lobectomy, positive for metastatic adenocarcinoma done at [facility] with no reoccurrence and his cancer is followed at [facility] in [place]. 4.  Insulin-dependent diabetes, hemoglobin A1c of 7.0.  5. Arthritis. 6.  Hypertension. 7.  Vasectomy. 8.  Neuropathy. 9.  Hyperlipidemia. 10.  Cardiac as above. FAMILY HISTORY:  Father  of CHF with an MI. Mother  of bypass surgery. Sister had a stent. SOCIAL HISTORY:  He is 78years old. His wife, Jenny Clemons,  on , with the  on . He has three children. Retired from [employer] in [employer]. Does not smoke or drink alcohol. Daughter is a nurse now in [place]. REVIEW OF SYSTEMS:  Cardiac as above. Other systems reviewed including constitutional, eyes, ears, nose and throat, cardiovascular, respiratory, GI, , musculoskeletal, integumentary, neurologic, endocrine, hematologic, and allergic/immunologic are negative except for what is described above. No weight loss or weight gain. No change in bowel habits.   No blood in a vein graft to the OM and a vein graft to the acute marginal branch of the right coronary artery. 4.  Hypertension. 5.  Hyperlipidemia, well controlled. 6.  Insulin-dependent diabetes, followed by Dr. Claudia Garcia, with his last hemoglobin A1c of 7.0.  7. Prostate carcinoma with prostatectomy in 06/2006, after left upper lobectomy because of metastatic adenocarcinoma in 2007 with no reoccurrence. PLAN:  1. We will do a Lexiscan Cardiolite stress test.  2.  Echocardiogram.  3.  Start Imdur 30 mg daily. 4.  Follow up in three to four weeks to review testing and see the result of adding Imdur 30 mg daily. DISCUSSION:  Mr. Irma Pederson has developed chest pain and shortness of breath consistent with angina and similar to his previous angina in 2017. His chest pain and shortness of breath occur exclusively with activity such as doing some yard work. I am concerned that he is developing further coronary artery disease with perhaps bypass graft issues. I will do a [place] stress test and we will also do an echocardiogram to review his LV function, which historically has been normal.    We will treat him with full medical therapy with the addition of Imdur 30 mg daily. I will meet with him in three to four weeks to see the result of full medical therapy and also the results of his above tests. I suspect that we will need to proceed on with a cardiac catheterization, but we will try medical therapy first.    Thank you very much for allowing me the privilege of seeing Mr. Irma Pederson. If you have any questions on my thoughts, please do not hesitate to contact me.     Sincerely,        Jannette Sauceda    D: 07/11/2022 10:07:12     T: 07/11/2022 10:10:55     [email or web address]  Job#: [id number]   Doc#: [id number]

## 2022-07-11 NOTE — PATIENT INSTRUCTIONS
Will set up for lexiscan stress test and echo     Will follow up in 3-4 weeks to review testing     Will add Imdur 30 mg one tablet daily

## 2022-07-12 LAB
EKG ATRIAL RATE: 60 BPM
EKG P AXIS: 31 DEGREES
EKG P-R INTERVAL: 144 MS
EKG Q-T INTERVAL: 468 MS
EKG QRS DURATION: 86 MS
EKG QTC CALCULATION (BAZETT): 468 MS
EKG R AXIS: -18 DEGREES
EKG T AXIS: 83 DEGREES
EKG VENTRICULAR RATE: 60 BPM

## 2022-07-12 NOTE — PROGRESS NOTES
Sanju Street M.D. [address] [address]  [phone]        2022        MD Heidy Trejolamilagros [facility], [facility] [place] 82    RE:   Jacque Lynsey  :  1945    Dear Dr. Martin Borges:    CHIEF COMPLAINT:  1. Chest pain with activity consistent with angina, new in the last three to four months. 2.  Coronary artery disease status post bypass surgery on 2018, with a LIMA to the LAD, a vein graft to the OM and a vein graft to the right coronary artery. HISTORY OF PRESENT ILLNESS:  I had the pleasure of seeing Mr. Camilla Arora in the office on 2022. He is a pleasant 82-year-old gentleman who unfortunately suffered the death of his wife on 2022; his sister is staying with him now. He is waiting to live in [facility]. He of course was very broken during this visit. She did have cervical cancer for over a year and deteriorated mentally as well as physically. He does have a significant cardiac history. He developed shortness of breath and chest pain in 2017. We did a Lexiscan stress test that showed inferolateral ischemia and I did a catheterization on 2018 that showed 90% left main trunk, 90% LAD, 95% circumflex, 70% RCA, with an EF of 60%. He had bypass surgery on 2018, with a LIMA to the LAD, a vein graft to the OM and a vein graft to the acute marginal branch of the right coronary artery. He has had no hospitalizations or procedures. However, he has developed chest tightness and shortness of breath in his chest with activity. It started several months ago. He initially thought that it was due to anxiety and stress with his wife. However, it did start before she acutely deteriorated. He does have edema in both lower extremities, which is an old finding. He has had no PND or orthopnea. Denies any palpitations. He has had no syncope or near syncope. He has also had no resting discomfort. He attempted to do some yard work yesterday and had to sit because of chest heaviness and tightness. CARDIAC RISK FACTORS:  Hypertension:  Positive. Hyperlipidemia:  Positive. Other Family Members:  Positive. Diabetes:  Positive. Peripheral Vascular Disease:  Negative. Smoking:  Negative. Bypass Surgery:  Positive. MEDICATIONS AT THIS TIME:  He is on aspirin 81 mg daily; vitamin D 50,000 units weekly; NPH (Humulin N) 40 units at bedtime; regular insulin 28 in the morning, 12 at lunch, 20 at supper; Lupron 22.5 injections every three months; Xyzal 5 mg daily; lisinopril 5 mg two tablets b.i.d.; Glucophage 1000 mg b.i.d.; Toprol-XL 50 mg b.i.d.; trazodone 100 mg nightly. PAST MEDICAL AND SURGICAL HISTORY:  1.  Prostate carcinoma diagnosed in . 2.  Robotic prostatectomy in 2006, no reoccurrence. 3.  Pulmonary nodules in , with left upper lobectomy, positive for metastatic adenocarcinoma done at [facility] with no reoccurrence and his cancer is followed at [facility] in [facility]. 4.  Insulin-dependent diabetes, hemoglobin A1c of 7.0.  5. Arthritis. 6.  Hypertension. 7.  Vasectomy. 8.  Neuropathy. 9.  Hyperlipidemia. 10.  Cardiac as above. FAMILY HISTORY:  Father  of CHF with an MI. Mother  of bypass surgery. Sister had a stent. SOCIAL HISTORY:  He is 78years old. His wife, Phoenix Hanson,  on , with the  on . He has three children. Retired from [employer] in [employer]. Does not smoke or drink alcohol. Daughter is a nurse now in [facility]. REVIEW OF SYSTEMS:  Cardiac as above. Other systems reviewed including constitutional, eyes, ears, nose and throat, cardiovascular, respiratory, GI, , musculoskeletal, integumentary, neurologic, endocrine, hematologic, and allergic/immunologic are negative except for what is described above. No weight loss or weight gain. No change in bowel habits.   No blood in a vein graft to the OM and a vein graft to the acute marginal branch of the right coronary artery. 4.  Hypertension. 5.  Hyperlipidemia, well controlled. 6.  Insulin-dependent diabetes, followed by Dr. Carrington Jimenez, with his last hemoglobin A1c of 7.0.  7. Prostate carcinoma with prostatectomy in 06/2006, after left upper lobectomy because of metastatic adenocarcinoma in 2007 with no reoccurrence. PLAN:  1. We will do a Lexiscan Cardiolite stress test.  2.  Echocardiogram.  3.  Start Imdur 30 mg daily. 4.  Follow up in three to four weeks to review testing and see the result of adding Imdur 30 mg daily. DISCUSSION:  Mr. Anand Auguste has developed chest pain and shortness of breath consistent with angina and similar to his previous angina in 2017. His chest pain and shortness of breath occur exclusively with activity such as doing some yard work. I am concerned that he is developing further coronary artery disease with perhaps bypass graft issues. I will do a Georgetta Castañeda stress test and we will also do an echocardiogram to review his LV function, which historically has been normal.    We will treat him with full medical therapy with the addition of Imdur 30 mg daily. I will meet with him in three to four weeks to see the result of full medical therapy and also the results of his above tests. I suspect that we will need to proceed on with a cardiac catheterization, but we will try medical therapy first.    Thank you very much for allowing me the privilege of seeing Mr. Anand Auguste. If you have any questions on my thoughts, please do not hesitate to contact me.     Sincerely,        Nitesh Brasher    D: 07/11/2022 10:07:12     T: 07/11/2022 10:10:55     [email or web address]  Job#: [id number]   Doc#: [id number]

## 2022-07-20 ENCOUNTER — HOSPITAL ENCOUNTER (OUTPATIENT)
Dept: NUCLEAR MEDICINE | Age: 77
Discharge: HOME | End: 2022-07-22
Payer: MEDICARE

## 2022-07-20 ENCOUNTER — HOSPITAL ENCOUNTER (OUTPATIENT)
Dept: NON INVASIVE DIAGNOSTICS | Age: 77
Discharge: HOME | End: 2022-07-20
Payer: MEDICARE

## 2022-07-20 VITALS — SYSTOLIC BLOOD PRESSURE: 185 MMHG | HEART RATE: 81 BPM | DIASTOLIC BLOOD PRESSURE: 88 MMHG

## 2022-07-20 DIAGNOSIS — R06.02 SOB (SHORTNESS OF BREATH): ICD-10-CM

## 2022-07-20 DIAGNOSIS — R07.9 CHEST PAIN, UNSPECIFIED TYPE: ICD-10-CM

## 2022-07-20 LAB
LV EF: 45 %
LVEF MODALITY: NORMAL

## 2022-07-20 PROCEDURE — 93018 CV STRESS TEST I&R ONLY: CPT | Performed by: INTERNAL MEDICINE

## 2022-07-20 PROCEDURE — A9500 TC99M SESTAMIBI: HCPCS | Performed by: INTERNAL MEDICINE

## 2022-07-20 PROCEDURE — 93306 TTE W/DOPPLER COMPLETE: CPT

## 2022-07-20 PROCEDURE — 93016 CV STRESS TEST SUPVJ ONLY: CPT | Performed by: INTERNAL MEDICINE

## 2022-07-20 PROCEDURE — 2580000003 HC RX 258: Performed by: INTERNAL MEDICINE

## 2022-07-20 PROCEDURE — 3430000000 HC RX DIAGNOSTIC RADIOPHARMACEUTICAL: Performed by: INTERNAL MEDICINE

## 2022-07-20 PROCEDURE — 78452 HT MUSCLE IMAGE SPECT MULT: CPT | Performed by: INTERNAL MEDICINE

## 2022-07-20 PROCEDURE — 93017 CV STRESS TEST TRACING ONLY: CPT

## 2022-07-20 PROCEDURE — 78452 HT MUSCLE IMAGE SPECT MULT: CPT

## 2022-07-20 PROCEDURE — 6360000002 HC RX W HCPCS: Performed by: INTERNAL MEDICINE

## 2022-07-20 RX ORDER — SODIUM CHLORIDE 0.9 % (FLUSH) 0.9 %
10 SYRINGE (ML) INJECTION PRN
Status: DISCONTINUED | OUTPATIENT
Start: 2022-07-20 | End: 2022-07-21 | Stop reason: HOSPADM

## 2022-07-20 RX ORDER — MONTELUKAST SODIUM 10 MG/1
10 TABLET ORAL NIGHTLY
COMMUNITY
Start: 2022-06-28

## 2022-07-20 RX ORDER — AMINOPHYLLINE DIHYDRATE 25 MG/ML
100 INJECTION, SOLUTION INTRAVENOUS
Status: DISPENSED | OUTPATIENT
Start: 2022-07-20 | End: 2022-07-20

## 2022-07-20 RX ORDER — AMINOPHYLLINE DIHYDRATE 25 MG/ML
50 INJECTION, SOLUTION INTRAVENOUS
Status: ACTIVE | OUTPATIENT
Start: 2022-07-20 | End: 2022-07-20

## 2022-07-20 RX ADMIN — REGADENOSON 0.4 MG: 0.08 INJECTION, SOLUTION INTRAVENOUS at 11:24

## 2022-07-20 RX ADMIN — TETRAKIS(2-METHOXYISOBUTYLISOCYANIDE)COPPER(I) TETRAFLUOROBORATE 10 MILLICURIE: 1 INJECTION, POWDER, LYOPHILIZED, FOR SOLUTION INTRAVENOUS at 10:46

## 2022-07-20 RX ADMIN — SODIUM CHLORIDE, PRESERVATIVE FREE 10 ML: 5 INJECTION INTRAVENOUS at 11:25

## 2022-07-20 RX ADMIN — TETRAKIS(2-METHOXYISOBUTYLISOCYANIDE)COPPER(I) TETRAFLUOROBORATE 30 MILLICURIE: 1 INJECTION, POWDER, LYOPHILIZED, FOR SOLUTION INTRAVENOUS at 10:46

## 2022-07-20 NOTE — PROGRESS NOTES
Lexiscan administered. 11:27 Pt reports feeling sick to stomach queasy. 11:32 Pt denies chest pain or shortness of breath. This part of test completed. Pt given snack and beverage.

## 2022-07-21 ENCOUNTER — TELEPHONE (OUTPATIENT)
Dept: CARDIOLOGY CLINIC | Age: 77
End: 2022-07-21

## 2022-07-21 ENCOUNTER — HOSPITAL ENCOUNTER (EMERGENCY)
Age: 77
Discharge: ANOTHER HOSPITAL | End: 2022-07-21
Attending: FAMILY MEDICINE
Payer: MEDICARE

## 2022-07-21 ENCOUNTER — APPOINTMENT (OUTPATIENT)
Dept: GENERAL RADIOLOGY | Age: 77
End: 2022-07-21
Payer: MEDICARE

## 2022-07-21 VITALS
RESPIRATION RATE: 12 BRPM | SYSTOLIC BLOOD PRESSURE: 160 MMHG | HEIGHT: 69 IN | DIASTOLIC BLOOD PRESSURE: 80 MMHG | TEMPERATURE: 97.3 F | OXYGEN SATURATION: 92 % | HEART RATE: 71 BPM | WEIGHT: 194 LBS | BODY MASS INDEX: 28.73 KG/M2

## 2022-07-21 DIAGNOSIS — I21.4 NSTEMI (NON-ST ELEVATED MYOCARDIAL INFARCTION) (HCC): Primary | ICD-10-CM

## 2022-07-21 DIAGNOSIS — Z95.1 HISTORY OF CORONARY ARTERY BYPASS GRAFT: ICD-10-CM

## 2022-07-21 DIAGNOSIS — Z20.822 LAB TEST NEGATIVE FOR COVID-19 VIRUS: ICD-10-CM

## 2022-07-21 LAB
ABSOLUTE EOS #: 0.1 K/UL (ref 0–0.4)
ABSOLUTE LYMPH #: 1.4 K/UL (ref 1–4.8)
ABSOLUTE MONO #: 0.6 K/UL (ref 0–1)
ANION GAP SERPL CALCULATED.3IONS-SCNC: 15 MMOL/L (ref 9–17)
BASOPHILS # BLD: 0 % (ref 0–2)
BASOPHILS ABSOLUTE: 0 K/UL (ref 0–0.2)
BUN BLDV-MCNC: 14 MG/DL (ref 8–23)
BUN/CREAT BLD: 14 (ref 9–20)
CALCIUM SERPL-MCNC: 9.6 MG/DL (ref 8.6–10.4)
CHLORIDE BLD-SCNC: 96 MMOL/L (ref 98–107)
CO2: 23 MMOL/L (ref 20–31)
CREAT SERPL-MCNC: 1 MG/DL (ref 0.7–1.2)
DIFFERENTIAL TYPE: YES
EOSINOPHILS RELATIVE PERCENT: 2 % (ref 0–5)
GFR AFRICAN AMERICAN: >60 ML/MIN
GFR NON-AFRICAN AMERICAN: >60 ML/MIN
GFR SERPL CREATININE-BSD FRML MDRD: ABNORMAL ML/MIN/{1.73_M2}
GLUCOSE BLD-MCNC: 375 MG/DL (ref 70–99)
HCT VFR BLD CALC: 40.7 % (ref 41–53)
HEMOGLOBIN: 13.8 G/DL (ref 13.5–17.5)
INR BLD: 0.9
LYMPHOCYTES # BLD: 17 % (ref 13–44)
MCH RBC QN AUTO: 32.1 PG (ref 26–34)
MCHC RBC AUTO-ENTMCNC: 33.8 G/DL (ref 31–37)
MCV RBC AUTO: 95 FL (ref 80–100)
MONOCYTES # BLD: 7 % (ref 5–9)
PARTIAL THROMBOPLASTIN TIME: 25.9 SEC (ref 23.9–33.8)
PARTIAL THROMBOPLASTIN TIME: 27.3 SEC (ref 23.9–33.8)
PDW BLD-RTO: 13.3 % (ref 12.1–15.2)
PLATELET # BLD: 235 K/UL (ref 140–450)
POTASSIUM SERPL-SCNC: 4.3 MMOL/L (ref 3.7–5.3)
PROTHROMBIN TIME: 12.3 SEC (ref 11.5–14.2)
RBC # BLD: 4.29 M/UL (ref 4.5–5.9)
SARS-COV-2, RAPID: NOT DETECTED
SEG NEUTROPHILS: 74 % (ref 39–75)
SEGMENTED NEUTROPHILS ABSOLUTE COUNT: 6.2 K/UL (ref 2.1–6.5)
SODIUM BLD-SCNC: 134 MMOL/L (ref 135–144)
SPECIMEN DESCRIPTION: NORMAL
TROPONIN, HIGH SENSITIVITY: 111 NG/L (ref 0–22)
TROPONIN, HIGH SENSITIVITY: 26 NG/L (ref 0–22)
TROPONIN, HIGH SENSITIVITY: 46 NG/L (ref 0–22)
TSH SERPL DL<=0.05 MIU/L-ACNC: 1.18 UIU/ML (ref 0.3–5)
WBC # BLD: 8.4 K/UL (ref 3.5–11)

## 2022-07-21 PROCEDURE — 85730 THROMBOPLASTIN TIME PARTIAL: CPT

## 2022-07-21 PROCEDURE — 80048 BASIC METABOLIC PNL TOTAL CA: CPT

## 2022-07-21 PROCEDURE — 85025 COMPLETE CBC W/AUTO DIFF WBC: CPT

## 2022-07-21 PROCEDURE — 84484 ASSAY OF TROPONIN QUANT: CPT

## 2022-07-21 PROCEDURE — 99285 EMERGENCY DEPT VISIT HI MDM: CPT

## 2022-07-21 PROCEDURE — 6360000002 HC RX W HCPCS: Performed by: FAMILY MEDICINE

## 2022-07-21 PROCEDURE — 84443 ASSAY THYROID STIM HORMONE: CPT

## 2022-07-21 PROCEDURE — 2500000003 HC RX 250 WO HCPCS: Performed by: FAMILY MEDICINE

## 2022-07-21 PROCEDURE — 85610 PROTHROMBIN TIME: CPT

## 2022-07-21 PROCEDURE — C9803 HOPD COVID-19 SPEC COLLECT: HCPCS

## 2022-07-21 PROCEDURE — 87635 SARS-COV-2 COVID-19 AMP PRB: CPT

## 2022-07-21 PROCEDURE — 71045 X-RAY EXAM CHEST 1 VIEW: CPT

## 2022-07-21 PROCEDURE — 96372 THER/PROPH/DIAG INJ SC/IM: CPT

## 2022-07-21 PROCEDURE — 93005 ELECTROCARDIOGRAM TRACING: CPT

## 2022-07-21 PROCEDURE — 36415 COLL VENOUS BLD VENIPUNCTURE: CPT

## 2022-07-21 PROCEDURE — 96366 THER/PROPH/DIAG IV INF ADDON: CPT

## 2022-07-21 PROCEDURE — 96365 THER/PROPH/DIAG IV INF INIT: CPT

## 2022-07-21 PROCEDURE — 6370000000 HC RX 637 (ALT 250 FOR IP): Performed by: FAMILY MEDICINE

## 2022-07-21 RX ORDER — ASPIRIN 81 MG/1
324 TABLET, CHEWABLE ORAL ONCE
Status: COMPLETED | OUTPATIENT
Start: 2022-07-21 | End: 2022-07-21

## 2022-07-21 RX ORDER — HEPARIN SODIUM 10000 [USP'U]/100ML
5-30 INJECTION, SOLUTION INTRAVENOUS CONTINUOUS
Status: DISCONTINUED | OUTPATIENT
Start: 2022-07-21 | End: 2022-07-21

## 2022-07-21 RX ORDER — LISINOPRIL 5 MG/1
1 TABLET ORAL 2 TIMES DAILY
COMMUNITY
Start: 2022-07-04 | End: 2022-08-02 | Stop reason: SDUPTHER

## 2022-07-21 RX ORDER — HEPARIN SODIUM 1000 [USP'U]/ML
60 INJECTION, SOLUTION INTRAVENOUS; SUBCUTANEOUS PRN
Status: DISCONTINUED | OUTPATIENT
Start: 2022-07-21 | End: 2022-07-21

## 2022-07-21 RX ORDER — HEPARIN SODIUM 1000 [USP'U]/ML
60 INJECTION, SOLUTION INTRAVENOUS; SUBCUTANEOUS ONCE
Status: DISCONTINUED | OUTPATIENT
Start: 2022-07-21 | End: 2022-07-21

## 2022-07-21 RX ORDER — ENOXAPARIN SODIUM 100 MG/ML
1 INJECTION SUBCUTANEOUS ONCE
Status: COMPLETED | OUTPATIENT
Start: 2022-07-21 | End: 2022-07-21

## 2022-07-21 RX ORDER — HEPARIN SODIUM 1000 [USP'U]/ML
30 INJECTION, SOLUTION INTRAVENOUS; SUBCUTANEOUS PRN
Status: DISCONTINUED | OUTPATIENT
Start: 2022-07-21 | End: 2022-07-21

## 2022-07-21 RX ORDER — NITROGLYCERIN 20 MG/100ML
5-200 INJECTION INTRAVENOUS CONTINUOUS
Status: DISCONTINUED | OUTPATIENT
Start: 2022-07-21 | End: 2022-07-21 | Stop reason: HOSPADM

## 2022-07-21 RX ADMIN — NITROGLYCERIN 5 MCG/MIN: 20 INJECTION INTRAVENOUS at 15:44

## 2022-07-21 RX ADMIN — ASPIRIN 81 MG 324 MG: 81 TABLET ORAL at 12:34

## 2022-07-21 RX ADMIN — ENOXAPARIN SODIUM 90 MG: 100 INJECTION SUBCUTANEOUS at 13:57

## 2022-07-21 ASSESSMENT — PAIN - FUNCTIONAL ASSESSMENT
PAIN_FUNCTIONAL_ASSESSMENT: 0-10
PAIN_FUNCTIONAL_ASSESSMENT: 0-10

## 2022-07-21 ASSESSMENT — PAIN DESCRIPTION - PAIN TYPE: TYPE: ACUTE PAIN

## 2022-07-21 ASSESSMENT — PAIN DESCRIPTION - LOCATION
LOCATION: CHEST
LOCATION: CHEST

## 2022-07-21 ASSESSMENT — PAIN SCALES - GENERAL
PAINLEVEL_OUTOF10: 8
PAINLEVEL_OUTOF10: 3

## 2022-07-21 ASSESSMENT — PAIN DESCRIPTION - FREQUENCY: FREQUENCY: CONTINUOUS

## 2022-07-21 ASSESSMENT — PAIN DESCRIPTION - ORIENTATION: ORIENTATION: MID;LEFT

## 2022-07-21 ASSESSMENT — HEART SCORE: ECG: 1

## 2022-07-21 NOTE — ED NOTES
Writer calls report to ICU. Phone number provided is [phone].      Federica Tavares RN  07/21/22 [id number]

## 2022-07-21 NOTE — TELEPHONE ENCOUNTER
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  Pt verbalized understanding and stated his sister in law (who is with the pt) will bring him now.

## 2022-07-21 NOTE — ED PROVIDER NOTES
[facility]  eMERGENCY dEPARTMENT eNCOUnter          CHIEF COMPLAINT       Chief Complaint   Patient presents with    Chest Pain     Patient arrives to ER today after calling Dr. Fortino Roche with c/o chest pain this morning following a stress test yesterday. Pt reports pain 8/10. Pt has not had ASA but took nitro at 0700 x 1 dose. Nurses Notes reviewed and I agree except as noted in the HPI. HISTORY OF PRESENT ILLNESS    Scottie Robb is a 78 y.o. male who presents via private vehicle, patient complaining of chest pain anginal symptoms, indicating lower left substernal area, patient been having pain for the past week that was worsening today, patient contact his cardiology office saying that he was having 8 out of 10 chest pain, his cardiology office advised with emergency room. Patient states he woke up this morning with elevated blood sugars found his blood pressure also been elevated at that time. Patient took 1 nitroglycerin this morning at 070 0 hours, has not had his morning aspirin. Patient has noted history of CABG/CAD states has been compliant with all medications. Dr. Shannan Swanson, cardiology, it, the emergency room prior to the patient arrival, advised patient's history of CABG, states patient's been having anginal symptoms for the past week, then patient's wife passed 2 weeks ago. Patient did have a stress test 2 days ago that was abnormal, given patient's history as well as recent abnormal test, would advise getting patient transferred to a facility with cardiac catheterization capabilities patient's has had a catheterization 8/2 at [place] however this to be too far out for this patient. PCP: Cecilia Jurist  Cards: [facility]    REVIEW OF SYSTEMS     Review of Systems   All other systems reviewed and are negative.        PAST MEDICAL HISTORY    has a past medical history of Allergic rhinitis, Arthritis, CAD (coronary artery disease), Cancer ([address] Diabetic neuropathy ([address] Hyperlipidemia, Hypertension, and Type II or unspecified type diabetes mellitus without mention of complication, not stated as uncontrolled. SURGICAL HISTORY      has a past surgical history that includes Lung biopsy (2007); Vasectomy; cyst removal; Prostate biopsy; Colonoscopy (2013); Tooth Extraction (4/2014); Circumcision (2014); and Cardiac catheterization (01/17/2018). CURRENT MEDICATIONS       Discharge Medication List as of 7/21/2022  5:56 PM        CONTINUE these medications which have NOT CHANGED    Details   !! lisinopril (PRINIVIL;ZESTRIL) 40 MG tablet Take 1 tablet by mouth in the morning. Historical Med      montelukast (SINGULAIR) 10 MG tablet Take 10 mg by mouth nightlyHistorical Med      isosorbide mononitrate (IMDUR) 30 MG extended release tablet Take 1 tablet by mouth daily, Disp-30 tablet, R-11Normal      metoprolol succinate (TOPROL XL) 50 MG extended release tablet TAKE 1 TABLET BY MOUTH TWICE DAILY, Disp-180 tablet, R-3Normal      traZODone (DESYREL) 100 MG tablet Take 1 tablet by mouth nightly, Disp-90 tablet, R-1Normal      !! lisinopril (PRINIVIL;ZESTRIL) 5 MG tablet TAKE 1 TABLET BY MOUTH TWICE DAILY, Disp-180 tablet, R-3Normal      Multiple Vitamins-Minerals (MULTIVITAL PO) Take by mouthHistorical Med      ergocalciferol (ERGOCALCIFEROL) 1.25 MG (75774 UT) capsule Take 50,000 Units by mouth once a weekHistorical Med      insulin NPH (HUMULIN N) 100 UNIT/ML injection vial Inject 26 U at bedtime, Disp-1 vial, R-5Normal      insulin regular (HUMULIN R;NOVOLIN R) 100 UNIT/ML injection Inject into the skin See Admin Instructions 25 U in the AM, 8 at lunch and 18 at supper plus sliding scale 2 units for every 50mg/dl for sugar over 200. Historical Med      Glucose Blood (BLOOD GLUCOSE TEST STRIPS) STRP Use to test blood sugar twice daily as needed, Disp-100 strip, R-3Normal      metFORMIN (GLUCOPHAGE) 1000 MG tablet Take 1 tablet by mouth 2 times daily (with meals), Disp-180 tablet, R-1Normal      EPINEPHrine (EPIPEN) 0.3 MG/0.3ML SOAJ injection Inject 0.3 mg into the muscleHistorical Med      Lancets MISC Disp-100 each, R-3, NormalUse to test blood sugars twice daily as needed      Calcium Carb-Cholecalciferol 600-500 MG-UNIT CAPS Take 1 capsule by mouth 2 times dailyHistorical Med      Magnesium Oxide 500 MG TABS Take 1 tablet by mouth dailyHistorical Med      aspirin 81 MG EC tablet Take 81 mg by mouth dailyHistorical Med      NONFORMULARY Indications: Neuravite takes 1 daily B1, B6, B12, alpha lipoic acid in each capsule. Historical Med      Blood Glucose Monitoring Suppl (BLOOD GLUCOSE METER) KIT Disp-1 kit, R-0, PrintUse to test blood sugar twice daily as needed      leuprolide (LUPRON) 22.5 MG injection Inject 22.5 mg into the muscle. Every three months      bicalutamide (CASODEX) 50 MG tablet Take 50 mg by mouth daily Historical Med       !! - Potential duplicate medications found. Please discuss with provider. ALLERGIES     is allergic to bee venom and januvia [sitagliptin phosphate]. FAMILY HISTORY     He indicated that the status of his mother is unknown. He indicated that the status of his father is unknown. He indicated that the status of his sister is unknown. He indicated that the status of his brother is unknown.   family history includes Dementia in his mother; Diabetes in his brother, mother, and sister; Heart Disease in his father. SOCIAL HISTORY      reports that he quit smoking about 37 years ago. He has a 20.00 pack-year smoking history. He has never used smokeless tobacco. He reports that he does not drink alcohol and does not use drugs. PHYSICAL EXAM     INITIAL VITALS:  height is 5' 9\" (1.753 m) and weight is 194 lb (88 kg). His temperature is 97.3 °F (36.3 °C). His blood pressure is 160/80 (abnormal) and his pulse is 71. His respiration is 12 and oxygen saturation is 92%.     Physical Exam   Constitutional: Patient is oriented to person, place, and time. Patient appears well-developed and well-nourished. Patient is active and cooperative. HENT:   Head: Normocephalic and atraumatic. Head is without contusion. Right Ear: Hearing and external ear normal. No drainage. Left Ear: Hearing and external ear normal. No drainage. Nose: Nose normal. No nasal deformity. No epistaxis. Mouth/Throat: Mucous membranes are not dry. Eyes: EOMI. Conjunctivae, sclera, and lids are normal. Right eye exhibits no discharge. Left eye exhibits no discharge. Neck: Full passive range of motion without pain and phonation normal.   Cardiovascular:  Normal rate, regular rhythm and intact distal pulses. No lower extremity edema. Pulses: Right radial pulse  2+   Pulmonary/Chest: Effort normal. No tachypnea and no bradypnea. No wheezes, rhonchi, or rales. Abdominal: BMI 28.6, soft. Patient without distension or tenderness  Musculoskeletal:   Negative acute trauma or deformity,  apparent full range of motion and normal strength all extremities appropriate to age. Neurological: Patient is alert and oriented to person, place, and time. patient displays no tremor. Patient displays no seizure activity. .    Skin: Skin is warm and dry. Patient is not diaphoretic. Psychiatric: Patient has a normal mood and pleasant affect. Patient speech is normal and behavior is normal. Cognition and memory are normal.     DIFFERENTIAL DIAGNOSIS:   ACS, AA, PE, GERD/gastritis, PTX, pericarditis, pleurisy, myocarditis, anxiety, msk    DIAGNOSTIC RESULTS     EKG: All EKG's are interpreted by the Emergency Department Physician who either signs or Co-signs this chart in the absence of a cardiologist.  EKG    The patient had an EKG which is interpreted by me in the absence of a Cardiologist.   [] Without comparison to previous.    [x] With comparison to a previous EKG Dated 7/5/2022    EKG @ 1218 hrs - sinus rhythm, rate 82, normal intervals, normal axis, T WI in V2 same axis as the QRS, noted ST depressions precordial leads V2-V6, not seen in prior EKG    EKG @ 1534 hrs -sinus rhythm rate 66, left axis, normal intervals, as compared to prior EKG, precordial ST depressions and inversions no longer appreciated, continued T WI in V2 same direction as QRS      RADIOLOGY: non-plain film images(s) such as CT, Ultrasound and MRI are read by the radiologist.  XR CHEST PORTABLE   Final Result      No acute cardiopulmonary disease. LABS:   Labs Reviewed   CBC WITH AUTO DIFFERENTIAL - Abnormal; Notable for the following components:       Result Value    RBC 4.29 (*)     Hematocrit 40.7 (*)     All other components within normal limits   BASIC METABOLIC PANEL W/ REFLEX TO MG FOR LOW K - Abnormal; Notable for the following components:    Glucose 375 (*)     Sodium 134 (*)     Chloride 96 (*)     All other components within normal limits   TROPONIN - Abnormal; Notable for the following components:    Troponin, High Sensitivity 26 (*)     All other components within normal limits   TROPONIN - Abnormal; Notable for the following components:    Troponin, High Sensitivity 46 (*)     All other components within normal limits   TROPONIN - Abnormal; Notable for the following components:    Troponin, High Sensitivity 111 (*)     All other components within normal limits   COVID-19, RAPID   PROTIME-INR   APTT   TSH WITH REFLEX   APTT       EMERGENCY DEPARTMENT COURSE:   Vitals:    Vitals:    07/21/22 1545 07/21/22 1600 07/21/22 1612 07/21/22 1615   BP: (!) 153/78 (!) 150/80 (!) 144/81 (!) 160/80   Pulse: 65 68 69 71   Resp: 11 12 15 12   Temp:       SpO2:  95% 96% 92%   Weight:       Height:         Patient history and physical exam taken at bedside, discussed patient's symptoms and exam findings as well as initial plan of workup to include EKG, chest x-ray, blood work with saline lock insertion, and chewable aspirin.   Patient placed on cardiac monitor and continuous pulse oximetry resting semi-Fowlers in bed.    EKG as above. Chest xray as above. Initial lab workup reviewed, noting hs-Nicholas of 26    HEART Score = 7    1 hour hsTnT ordered    Case was discussed with Dr. Arely Hernandez, including reviewing EKG as compared to prior    Discussed with patient and sister-in-law present initial work-up including EKG imaging and labs, discussed reason for getting 1 hour troponin, will continue to watch closely, patient stating he is chest pain-free at this time. We discussed my conversation with cardiology both before his arrival as well as now, will begin transfer process, acknowledged    Case was discussed with [facility] transfer center, regarding patient's presentation, work-up, I am advised that [facility] is only excepting active heart attacks trauma strokes etc., after short discussion with patient, he is agreeable with [facility], am advised patient is accepted there under Dr. Екатерина Colin, however there are no beds at this time, we will be put on waiting list    hsTnT 46    Updated patient on his second opponent, I do note the increased from prior, patient does remain chest pain-free, and been given some Lovenox in the interim, will order a third troponin. hsTnT 111    The patient and patient's daughter at bedside, again increase in troponin level, will contact [place] [phone] to have them advise of continue increased troponins, need for more immediate bed, I also advised that [address] [place] [address] respond in a reasonable amount of time we will need to begin looking further field     Requested ED  contact [place] [address] transfer center to advise them of increased troponins, need for more immediate bed at either [facility] or [place], also advised that we Jeny call back in a reasonable amount of time we will begin looking further field, beginning with [address], and if needs be additional in [place] or [facility] area.       As patient is already received Lovenox, will start nitroglycerin drip EKG ordered. Received: Follow-up from [address] at [facility], discussed patient presentation current work-up including my conversation with his cardiologist earlier, patient accepted for transfer    Discussed with patient and patient found member present, my conversation with [facility], plan transfer, acknowledged    Continue observe patient in the emergency room, no changes in status, on arrival EMS crew, report given, patient packaged for transport    Critical Care Time: 45 minutes, including direct patient interaction, discussion with patient's family discussion with cardiology, discussion with [facility] transfer center, discussion with [facility]    FINAL IMPRESSION      1. NSTEMI (non-ST elevated myocardial infarction) ([facility]    2. History of coronary artery bypass graft    3. Lab test negative for COVID-19 virus          DISPOSITION/PLAN   trn    PATIENT REFERRED TO:  No follow-up provider specified. DISCHARGE MEDICATIONS:  Discharge Medication List as of 7/21/2022  5:56 PM              Summation      Patient Course:  trn    ED Medications administered this visit:    Medications   aspirin chewable tablet 324 mg (324 mg Oral Given 7/21/22 1234)   enoxaparin (LOVENOX) injection 90 mg (90 mg SubCUTAneous Given 7/21/22 1357)       New Prescriptions from this visit:    Discharge Medication List as of 7/21/2022  5:56 PM          Follow-up:  No follow-up provider specified. Final Impression:   1. NSTEMI (non-ST elevated myocardial infarction) ([facility]    2. History of coronary artery bypass graft    3.  Lab test negative for COVID-19 virus               (Please note that portions of this note were completed with a voice recognition program.  Efforts were made to edit the dictations but occasionally words are mis-transcribed.)    MD Mary Chen MD  07/22/22 Gera Tucker MD  07/22/22 [id number]

## 2022-07-25 LAB
EKG ATRIAL RATE: 66 BPM
EKG ATRIAL RATE: 82 BPM
EKG P AXIS: 42 DEGREES
EKG P AXIS: 8 DEGREES
EKG P-R INTERVAL: 142 MS
EKG P-R INTERVAL: 144 MS
EKG Q-T INTERVAL: 382 MS
EKG Q-T INTERVAL: 422 MS
EKG QRS DURATION: 78 MS
EKG QRS DURATION: 90 MS
EKG QTC CALCULATION (BAZETT): 442 MS
EKG QTC CALCULATION (BAZETT): 446 MS
EKG R AXIS: -17 DEGREES
EKG R AXIS: -24 DEGREES
EKG T AXIS: 75 DEGREES
EKG T AXIS: 83 DEGREES
EKG VENTRICULAR RATE: 66 BPM
EKG VENTRICULAR RATE: 82 BPM

## 2022-07-25 NOTE — PROCEDURES
[place] [address]                              CARDIAC STRESS TEST    PATIENT NAME: Amina Longoria                       :        1945  MED REC NO:   [id number]                              ROOM:  ACCOUNT NO:   [de-identified]                           ADMIT DATE: 2022  PROVIDER:     Aime Jones      DATE OF STUDY:  2022    LEXISCAN CARDIOLITE STRESS TEST:    INDICATION:  Chest pain. IMPRESSION:  1. We gave 0.4 mg of Lexiscan intravenously. 2.  This was followed in 20 seconds by Cardiolite infusion. 3.  There was no chest pain. 4.  There was no ST depression. 5.  It was an overall negative Lexiscan stress test.  6.  Cardiolite to follow.       Enriqueta Tello    D: 2022 7:12:40       T: 2022 7:39:11     [email or web address]  Job#: [id number]     Doc#: [id number]    CC:  Vel Hanley
three-vessel coronary artery  disease (TID: 1.4). 2.  Global left ventricular systolic function was abnormal with an EF of  40% with regional wall motion abnormalities. Overall these results are most consistent with a high risk for  significant coronary artery disease. Additional testing including cardiac catheterization may be indicated. The results of this test were discussed with Dr. Tanisha Issa on 07/20/2022.         Fredrick Anthnoy MD    D: 07/20/2022 16:21:41       T: 07/20/2022 16:24:51     [email or web address]  Job#: [id number]     Doc#: Unknown    CC:  Alisa Castro

## 2022-08-02 ENCOUNTER — OFFICE VISIT (OUTPATIENT)
Dept: CARDIOLOGY CLINIC | Age: 77
End: 2022-08-02
Payer: MEDICARE

## 2022-08-02 VITALS
SYSTOLIC BLOOD PRESSURE: 140 MMHG | WEIGHT: 193 LBS | DIASTOLIC BLOOD PRESSURE: 60 MMHG | OXYGEN SATURATION: 95 % | BODY MASS INDEX: 28.5 KG/M2 | HEART RATE: 78 BPM

## 2022-08-02 DIAGNOSIS — R07.9 CHEST PAIN, UNSPECIFIED TYPE: Primary | ICD-10-CM

## 2022-08-02 PROCEDURE — 99214 OFFICE O/P EST MOD 30 MIN: CPT | Performed by: INTERNAL MEDICINE

## 2022-08-02 PROCEDURE — G8417 CALC BMI ABV UP PARAM F/U: HCPCS | Performed by: INTERNAL MEDICINE

## 2022-08-02 PROCEDURE — 3017F COLORECTAL CA SCREEN DOC REV: CPT | Performed by: INTERNAL MEDICINE

## 2022-08-02 PROCEDURE — 1123F ACP DISCUSS/DSCN MKR DOCD: CPT | Performed by: INTERNAL MEDICINE

## 2022-08-02 PROCEDURE — G8428 CUR MEDS NOT DOCUMENT: HCPCS | Performed by: INTERNAL MEDICINE

## 2022-08-02 PROCEDURE — 1036F TOBACCO NON-USER: CPT | Performed by: INTERNAL MEDICINE

## 2022-08-02 RX ORDER — PYRIDOXINE HCL (VITAMIN B6) 50 MG
50 TABLET ORAL DAILY
COMMUNITY

## 2022-08-02 RX ORDER — DOCUSATE SODIUM 100 MG/1
100 CAPSULE, LIQUID FILLED ORAL 2 TIMES DAILY
COMMUNITY

## 2022-08-02 RX ORDER — TOLTERODINE 4 MG/1
4 CAPSULE, EXTENDED RELEASE ORAL DAILY
COMMUNITY
End: 2022-08-11 | Stop reason: SDUPTHER

## 2022-08-02 RX ORDER — ATORVASTATIN CALCIUM 40 MG/1
TABLET, FILM COATED ORAL
COMMUNITY
Start: 2022-07-23

## 2022-08-02 RX ORDER — CLOPIDOGREL BISULFATE 75 MG/1
TABLET ORAL
COMMUNITY
Start: 2022-07-23

## 2022-08-02 RX ORDER — LISINOPRIL 5 MG/1
5 TABLET ORAL 2 TIMES DAILY
COMMUNITY

## 2022-08-02 RX ORDER — NAPROXEN SODIUM 220 MG
220 TABLET ORAL 2 TIMES DAILY WITH MEALS
COMMUNITY

## 2022-08-02 NOTE — LETTER
Dena Sylvester M.D. [address] [place] [address], [place] [address]  [phone]        2022        Jonathon Horne MD  [address] [facility] [address]    RE:   Kay Maya  :  1945    Dear Dr. Liberty Kim:    CHIEF COMPLAINT:  1. Severe coronary artery disease. 2.  Urgent cardiac catheterization at  [facility] on 2022, which showed 80% lesion in the mid LAD beyond the insertion of the left internal mammary artery bypass graft, which was stented with a 2.25 x 22 mm drug-eluting Resolute stent. HISTORY OF PRESENT ILLNESS:  I had the pleasure of seeing Mr. Radha Jenkins in our office on 2022. I trust you received my full H and P from . At that time, he was having chest discomfort, which was new in the last three to four months. He was also quite distraught as his wife had just passed away approximately two weeks prior. He did a Lexiscan Cardiolite stress test on  that was abnormal showing a moderately large perfusion defect in the anteroapical during stress consistent with ischemia. I placed him on full medical therapy and had an appointment back to see him to see if he was better on full medical therapy, and if not, then to proceed with a catheterization. He came to the emergency room on , with chest pain. Initially, his cardiac enzymes were negative but they gradually began to elevate. We attempted to transfer but no hospital was accepting except [place] or  [facility] . They graciously accepted him and a cardiac catheterization was done on , by Dr. Jenny Clemons. The catheterization showed severe coronary artery disease. His left main trunk was occluded. His right coronary artery had severe proximal diffuse lesion. The vein graft to the PDA was patent. He had multiple areas of 75% to 80% disease. The circumflex had a patent saphenous vein graft to the first OM. The second OM had focal areas of 80% disease. His LAD, of course which was occluded from the left main trunk, had a widely patent left internal mammary artery to the LAD. Beyond the insertion site, there was 80% disease in the LAD. His EF was 45% to 50%. Angioplasty was done of the LAD through the left internal mammary artery bypass graft. Initially, it was a dissection, but a 2.25 x 22 mm drug-eluting Resolute stent was placed with a good end result. I believe Dr. Allison Jacinto saw him in followup on 07/27, and has another appointment in one year. Mr. Jessee Camp overall is doing well. He does feel better and still feels \"weird. \"  He has not started cardiac rehab yet. He was called from [facility] to do it in [facility]. He would like to do it in [facility] but there is not a cardiac rehab in [facility]. We told him that it was fine to go to [facility], [place] or here, whichever is most convenient. MEDICATIONS:  His medications today are aspirin 81 mg daily, Lipitor 40 mg daily, Casodex 50 mg daily, Plavix 75 mg daily, lispro insulin 90 units in the morning and 80 units in the evening, Lupron 22.5 injections every three months, lisinopril 5 mg b.i.d., Glucophage 750 mg b.i.d., Toprol-XL 50 mg b.i.d., Singulair 10 mg nightly, vitamin B6 50 mg daily, and Detrol LA 4 mg daily. PHYSICAL EXAMINATION:  VITAL SIGNS:  His blood pressure was 140/60 with a heart rate of 78 and regular. Respirations 18. O2 sat 95%. Weight 193 pounds. GENERAL:  He is a very pleasant 29-year-old gentleman who was in much better spirits today than the last time. He answered questions appropriately and was very pleasant. HEENT:  The pupils are equally round and intact. Mucous membranes were dry. NECK:  No JVD. Good carotid pulses. No carotid bruits. No lymphadenopathy or thyromegaly. CARDIOVASCULAR EXAM:  S1 and S2 were normal.  No S3 or S4. Soft systolic blowing type murmur. No diastolic murmur. PMI was normal.  No lift, thrust, or pericardial friction rub.   LUNGS:  Clear to auscultation and percussion. ABDOMEN:  Soft and nontender. Good bowel sounds. EXTREMITIES:  Good femoral pulses. Good pedal pulses. No pedal edema. Skin was warm and dry. No calf tenderness. Nail beds pink. Good cap refill. PULSES:  Bilateral symmetrical radial, brachial and carotid pulses. No carotid bruits. Good femoral and pedal pulses. NEUROLOGIC EXAM:  Within normal limits. PSYCHIATRIC EXAM:  Within normal limits. IMPRESSION:  1. Increasing angina in the last two to three months. 2.  Abnormal Lexiscan Cardiolite stress test on 07/20/2022, showing anterior wall ischemia. 3.  Chest pain the following day, going to the ER and being transferred to [address] [place] [address] where he had a cardiac catheterization by Dr. Ronal Gavin that showed 90% lesion in his LAD beyond the insertion site of the left internal mammary artery to the LAD, the left main trunk was occluded, vein graft to the OM was patent with diffuse disease in the second and third OM, and patent vein graft to the right coronary artery with diffuse disease in the right coronary artery, EF of 45% to 50%, with stenting of his LAD through the left internal mammary artery bypass graft, placing a 2.25 x 22 mm drug-eluting Resolute stent, with a patent vein graft to the OM and a patent vein graft to the right coronary artery. 4.  Status post catheterization on 01/17/2018 showing 90% left main trunk with 90% LAD, 95% circumflex, 70% RCA, with an EF of 60%. 5.  Open heart surgery on 01/18/2018, by Dr. Rena Pinto placing a LIMA to the LAD, a vein graft to the OM and a vein graft to the acute marginal branch of the right coronary artery. 6.  Hyperlipidemia, well controlled. 7.  Hypertension, well controlled. 8.  Insulin-dependent diabetes, followed by Dr. Sue Horne. PLAN:  1. He will start cardiac rehab in whichever place that is most convenient consisting of [facility], [facility] or up here. 2.  He will follow up with Dr. Ronal Gavin in one year.   3.  I will plan on seeing him as needed. DISCUSSION:  Mr. Eve Rodarte is very fortunate. He did have a severe lesion in his LAD beyond the insertion of the left internal mammary artery to the LAD. There was a good result from the stent placement in his LAD. Dr. Lauren Mann indicated it is very important for him to have cardiac rehab and they have lined up [facility] He had a good report from Dr. Lauren Mann on 07/27, and he has graciously agreed to see him in one year. I would be glad to see him at any time on an as needed basis. If he would have any unusual chest pain or chest discomfort, I can see him before to make sure he is stable before he sees Dr. Lauren Mann in followup. Thank you very much for allowing me the privilege of seeing Mr. Eve Rodarte. I sincerely hope he continues to do well in the future. He is certainly in good hands with Dr. Lauren Mann.     Sincerely,        Susannah Escoto    D: 08/02/2022 11:01:11     T: 08/02/2022 11:07:12     [email or web address]  Job#: [id number]   Doc#: [id number]

## 2022-08-02 NOTE — PATIENT INSTRUCTIONS
Recommend to start cardiac rehab     Follow up with scheduled appt with   Dr Adamson Ice     Will see us as needed

## 2022-08-04 ENCOUNTER — TELEPHONE (OUTPATIENT)
Dept: CARDIOLOGY CLINIC | Age: 77
End: 2022-08-04

## 2022-08-04 NOTE — PROGRESS NOTES
Jass Roth M.D. [address] [place] [address], [place] [address]  [phone]        2022        Joy Mcgregor MD  [address] [place] [address]    RE:   Mikie Said  :  1945    Dear Dr. Romayne Box:    CHIEF COMPLAINT:  1. Severe coronary artery disease. 2.  Urgent cardiac catheterization at  [facility] on 2022, which showed 80% lesion in the mid LAD beyond the insertion of the left internal mammary artery bypass graft, which was stented with a 2.25 x 22 mm drug-eluting Resolute stent. HISTORY OF PRESENT ILLNESS:  I had the pleasure of seeing Mr. Celine Spaulding in our office on 2022. I trust you received my full H and P from . At that time, he was having chest discomfort, which was new in the last three to four months. He was also quite distraught as his wife had just passed away approximately two weeks prior. He did a Lexiscan Cardiolite stress test on  that was abnormal showing a moderately large perfusion defect in the anteroapical during stress consistent with ischemia. I placed him on full medical therapy and had an appointment back to see him to see if he was better on full medical therapy, and if not, then to proceed with a catheterization. He came to the emergency room on , with chest pain. Initially, his cardiac enzymes were negative but they gradually began to elevate. We attempted to transfer but no hospital was accepting except [facility]. They graciously accepted him and a cardiac catheterization was done on , by Dr. Ronal Gavin. The catheterization showed severe coronary artery disease. His left main trunk was occluded. His right coronary artery had severe proximal diffuse lesion. The vein graft to the PDA was patent. He had multiple areas of 75% to 80% disease. The circumflex had a patent saphenous vein graft to the first OM. The second OM had focal areas of 80% disease. His LAD, of course which was occluded from the left main trunk, had a widely patent left internal mammary artery to the LAD. Beyond the insertion site, there was 80% disease in the LAD. His EF was 45% to 50%. Angioplasty was done of the LAD through the left internal mammary artery bypass graft. Initially, it was a dissection, but a 2.25 x 22 mm drug-eluting Resolute stent was placed with a good end result. I believe Dr. Phoenix Hanson saw him in followup on 07/27, and has another appointment in one year. Mr. Lulu Machado overall is doing well. He does feel better and still feels \"weird. \"  He has not started cardiac rehab yet. He was called from [facility] to do it in [facility]. He would like to do it in [facility] but there is not a cardiac rehab in [facility]. We told him that it was fine to go to [facility], [place] or here, whichever is most convenient. MEDICATIONS:  His medications today are aspirin 81 mg daily, Lipitor 40 mg daily, Casodex 50 mg daily, Plavix 75 mg daily, lispro insulin 90 units in the morning and 80 units in the evening, Lupron 22.5 injections every three months, lisinopril 5 mg b.i.d., Glucophage 750 mg b.i.d., Toprol-XL 50 mg b.i.d., Singulair 10 mg nightly, vitamin B6 50 mg daily, and Detrol LA 4 mg daily. PHYSICAL EXAMINATION:  VITAL SIGNS:  His blood pressure was 140/60 with a heart rate of 78 and regular. Respirations 18. O2 sat 95%. Weight 193 pounds. GENERAL:  He is a very pleasant 72-year-old gentleman who was in much better spirits today than the last time. He answered questions appropriately and was very pleasant. HEENT:  The pupils are equally round and intact. Mucous membranes were dry. NECK:  No JVD. Good carotid pulses. No carotid bruits. No lymphadenopathy or thyromegaly. CARDIOVASCULAR EXAM:  S1 and S2 were normal.  No S3 or S4. Soft systolic blowing type murmur. No diastolic murmur. PMI was normal.  No lift, thrust, or pericardial friction rub.   LUNGS:  Clear to auscultation and percussion. ABDOMEN:  Soft and nontender. Good bowel sounds. EXTREMITIES:  Good femoral pulses. Good pedal pulses. No pedal edema. Skin was warm and dry. No calf tenderness. Nail beds pink. Good cap refill. PULSES:  Bilateral symmetrical radial, brachial and carotid pulses. No carotid bruits. Good femoral and pedal pulses. NEUROLOGIC EXAM:  Within normal limits. PSYCHIATRIC EXAM:  Within normal limits. IMPRESSION:  1. Increasing angina in the last two to three months. 2.  Abnormal Lexiscan Cardiolite stress test on 07/20/2022, showing anterior wall ischemia. 3.  Chest pain the following day, going to the ER and being transferred to [address] [place] [address] where he had a cardiac catheterization by Dr. Jenny Clemons that showed 90% lesion in his LAD beyond the insertion site of the left internal mammary artery to the LAD, the left main trunk was occluded, vein graft to the OM was patent with diffuse disease in the second and third OM, and patent vein graft to the right coronary artery with diffuse disease in the right coronary artery, EF of 45% to 50%, with stenting of his LAD through the left internal mammary artery bypass graft, placing a 2.25 x 22 mm drug-eluting Resolute stent, with a patent vein graft to the OM and a patent vein graft to the right coronary artery. 4.  Status post catheterization on 01/17/2018 showing 90% left main trunk with 90% LAD, 95% circumflex, 70% RCA, with an EF of 60%. 5.  Open heart surgery on 01/18/2018, by Dr. Rayray Ovalle placing a LIMA to the LAD, a vein graft to the OM and a vein graft to the acute marginal branch of the right coronary artery. 6.  Hyperlipidemia, well controlled. 7.  Hypertension, well controlled. 8.  Insulin-dependent diabetes, followed by Dr. Constance Davila. PLAN:  1. He will start cardiac rehab in whichever place that is most convenient consisting of [facility], [place] or up here. 2.  He will follow up with Dr. Jenny Clemons in one year.   3.  I will plan on seeing him as needed. DISCUSSION:  Mr. Irma Pederson is very fortunate. He did have a severe lesion in his LAD beyond the insertion of the left internal mammary artery to the LAD. There was a good result from the stent placement in his LAD. Dr. Sneha Clemente indicated it is very important for him to have cardiac rehab and they have lined up for [facility]. He had a good report from Dr. Sneha Clemente on 07/27, and he has graciously agreed to see him in one year. I would be glad to see him at any time on an as needed basis. If he would have any unusual chest pain or chest discomfort, I can see him before to make sure he is stable before he sees Dr. Sneha Clemente in followup. Thank you very much for allowing me the privilege of seeing Mr. Irma Pederson. I sincerely hope he continues to do well in the future. He is certainly in good hands with Dr. Sneha Clemente.     Sincerely,        Jannette Sauceda    D: 08/02/2022 11:01:11     T: 08/02/2022 11:07:12     [id number]  Job#: [id number]   Doc#: [id number]

## 2022-08-10 RX ORDER — CLOPIDOGREL BISULFATE 75 MG/1
75 TABLET ORAL DAILY
COMMUNITY
Start: 2022-07-24

## 2022-08-10 NOTE — TELEPHONE ENCOUNTER
Last OV: 11/23/2021 AWV  Last RX:    Next scheduled apt: Visit date not found        Pt requesting a refill to [facility]

## 2022-08-11 RX ORDER — TOLTERODINE 4 MG/1
4 CAPSULE, EXTENDED RELEASE ORAL DAILY
Qty: 30 CAPSULE | Refills: 1 | Status: SHIPPED | OUTPATIENT
Start: 2022-08-11 | End: 2022-10-17

## 2022-10-07 ENCOUNTER — IMMUNIZATION (OUTPATIENT)
Dept: FAMILY MEDICINE CLINIC | Age: 77
End: 2022-10-07
Payer: MEDICARE

## 2022-10-07 DIAGNOSIS — Z23 NEED FOR INFLUENZA VACCINATION: Primary | ICD-10-CM

## 2022-10-07 PROCEDURE — G0008 ADMIN INFLUENZA VIRUS VAC: HCPCS | Performed by: FAMILY MEDICINE

## 2022-10-07 PROCEDURE — 90694 VACC AIIV4 NO PRSRV 0.5ML IM: CPT | Performed by: FAMILY MEDICINE

## 2022-10-17 RX ORDER — TOLTERODINE 4 MG/1
4 CAPSULE, EXTENDED RELEASE ORAL DAILY
Qty: 30 CAPSULE | Refills: 5 | Status: SHIPPED | OUTPATIENT
Start: 2022-10-17

## 2022-10-17 NOTE — TELEPHONE ENCOUNTER
Last OV: 11/23/2021 AWV   Last RX:    Next scheduled apt: 11/29/2022   AWV               Surescript requesting a refill

## 2022-11-29 ENCOUNTER — OFFICE VISIT (OUTPATIENT)
Dept: FAMILY MEDICINE CLINIC | Age: 77
End: 2022-11-29
Payer: MEDICARE

## 2022-11-29 VITALS
HEART RATE: 74 BPM | OXYGEN SATURATION: 96 % | WEIGHT: 196 LBS | HEIGHT: 69 IN | DIASTOLIC BLOOD PRESSURE: 64 MMHG | SYSTOLIC BLOOD PRESSURE: 132 MMHG | BODY MASS INDEX: 29.03 KG/M2

## 2022-11-29 DIAGNOSIS — Z79.4 TYPE 2 DIABETES MELLITUS WITH DIABETIC NEUROPATHY, WITH LONG-TERM CURRENT USE OF INSULIN (HCC): ICD-10-CM

## 2022-11-29 DIAGNOSIS — G47.09 OTHER INSOMNIA: ICD-10-CM

## 2022-11-29 DIAGNOSIS — I10 ESSENTIAL HYPERTENSION, BENIGN: ICD-10-CM

## 2022-11-29 DIAGNOSIS — Z00.00 MEDICARE ANNUAL WELLNESS VISIT, SUBSEQUENT: Primary | ICD-10-CM

## 2022-11-29 DIAGNOSIS — C61 MALIGNANT NEOPLASM OF PROSTATE (HCC): ICD-10-CM

## 2022-11-29 DIAGNOSIS — E11.40 TYPE 2 DIABETES MELLITUS WITH DIABETIC NEUROPATHY, WITH LONG-TERM CURRENT USE OF INSULIN (HCC): ICD-10-CM

## 2022-11-29 DIAGNOSIS — E78.00 PURE HYPERCHOLESTEROLEMIA: ICD-10-CM

## 2022-11-29 PROBLEM — I50.22 CHRONIC SYSTOLIC (CONGESTIVE) HEART FAILURE (HCC): Status: ACTIVE | Noted: 2022-11-29

## 2022-11-29 PROCEDURE — 1036F TOBACCO NON-USER: CPT | Performed by: FAMILY MEDICINE

## 2022-11-29 PROCEDURE — G8427 DOCREV CUR MEDS BY ELIG CLIN: HCPCS | Performed by: FAMILY MEDICINE

## 2022-11-29 PROCEDURE — 2022F DILAT RTA XM EVC RTNOPTHY: CPT | Performed by: FAMILY MEDICINE

## 2022-11-29 PROCEDURE — 99214 OFFICE O/P EST MOD 30 MIN: CPT | Performed by: FAMILY MEDICINE

## 2022-11-29 PROCEDURE — G0439 PPPS, SUBSEQ VISIT: HCPCS | Performed by: FAMILY MEDICINE

## 2022-11-29 PROCEDURE — 3017F COLORECTAL CA SCREEN DOC REV: CPT | Performed by: FAMILY MEDICINE

## 2022-11-29 PROCEDURE — G8417 CALC BMI ABV UP PARAM F/U: HCPCS | Performed by: FAMILY MEDICINE

## 2022-11-29 PROCEDURE — 3078F DIAST BP <80 MM HG: CPT | Performed by: FAMILY MEDICINE

## 2022-11-29 PROCEDURE — G8484 FLU IMMUNIZE NO ADMIN: HCPCS | Performed by: FAMILY MEDICINE

## 2022-11-29 PROCEDURE — 3074F SYST BP LT 130 MM HG: CPT | Performed by: FAMILY MEDICINE

## 2022-11-29 PROCEDURE — 1123F ACP DISCUSS/DSCN MKR DOCD: CPT | Performed by: FAMILY MEDICINE

## 2022-11-29 PROCEDURE — 3046F HEMOGLOBIN A1C LEVEL >9.0%: CPT | Performed by: FAMILY MEDICINE

## 2022-11-29 RX ORDER — MAGNESIUM GLUCONATE 27 MG(500)
500 TABLET ORAL DAILY
COMMUNITY

## 2022-11-29 RX ORDER — TRAZODONE HYDROCHLORIDE 100 MG/1
TABLET ORAL
COMMUNITY
Start: 2022-08-27

## 2022-11-29 RX ORDER — M-VIT,TX,IRON,MINS/CALC/FOLIC 27MG-0.4MG
1 TABLET ORAL DAILY
COMMUNITY

## 2022-11-29 RX ORDER — LISINOPRIL 40 MG/1
40 TABLET ORAL DAILY
COMMUNITY

## 2022-11-29 SDOH — ECONOMIC STABILITY: FOOD INSECURITY: WITHIN THE PAST 12 MONTHS, THE FOOD YOU BOUGHT JUST DIDN'T LAST AND YOU DIDN'T HAVE MONEY TO GET MORE.: NEVER TRUE

## 2022-11-29 SDOH — ECONOMIC STABILITY: FOOD INSECURITY: WITHIN THE PAST 12 MONTHS, YOU WORRIED THAT YOUR FOOD WOULD RUN OUT BEFORE YOU GOT MONEY TO BUY MORE.: NEVER TRUE

## 2022-11-29 ASSESSMENT — PATIENT HEALTH QUESTIONNAIRE - PHQ9
SUM OF ALL RESPONSES TO PHQ9 QUESTIONS 1 & 2: 0
SUM OF ALL RESPONSES TO PHQ QUESTIONS 1-9: 0
SUM OF ALL RESPONSES TO PHQ QUESTIONS 1-9: 0
2. FEELING DOWN, DEPRESSED OR HOPELESS: 0
SUM OF ALL RESPONSES TO PHQ QUESTIONS 1-9: 0
1. LITTLE INTEREST OR PLEASURE IN DOING THINGS: 0
SUM OF ALL RESPONSES TO PHQ QUESTIONS 1-9: 0

## 2022-11-29 ASSESSMENT — ENCOUNTER SYMPTOMS
DIARRHEA: 0
ABDOMINAL PAIN: 0
EYE REDNESS: 0
NAUSEA: 0
EYE DISCHARGE: 0
BLOOD IN STOOL: 0
CONSTIPATION: 0
VOMITING: 0
COUGH: 0
SHORTNESS OF BREATH: 0
TROUBLE SWALLOWING: 0

## 2022-11-29 ASSESSMENT — LIFESTYLE VARIABLES: HOW MANY STANDARD DRINKS CONTAINING ALCOHOL DO YOU HAVE ON A TYPICAL DAY: PATIENT DOES NOT DRINK

## 2022-11-29 ASSESSMENT — SOCIAL DETERMINANTS OF HEALTH (SDOH): HOW HARD IS IT FOR YOU TO PAY FOR THE VERY BASICS LIKE FOOD, HOUSING, MEDICAL CARE, AND HEATING?: NOT HARD AT ALL

## 2022-11-29 NOTE — PROGRESS NOTES
HPI Notes    Name: Ju Le  : 1945        Chief Complaint:     Chief Complaint   Patient presents with    Medicare AWV    Diabetes     Pt follows with Endocrine. Hypertension    Hyperlipidemia    Insomnia     Pt states trazodone doesn't really help him sleep better. Pt added Aleve PM, Pt thinks it may be helping. History of Present Illness:     Ju Le is a 78 y.o.  male who presents with Medicare AWV, Diabetes (Pt follows with Endocrine.), Hypertension, Hyperlipidemia, and Insomnia (Pt states trazodone doesn't really help him sleep better. Pt added Aleve PM, Pt thinks it may be helping. /)      Diabetes  He presents for his follow-up diabetic visit. He has type 2 diabetes mellitus. His disease course has been stable. There are no hypoglycemic associated symptoms. Pertinent negatives for hypoglycemia include no dizziness, headaches or tremors. There are no diabetic associated symptoms. Pertinent negatives for diabetes include no chest pain and no fatigue. There are no hypoglycemic complications. There are no diabetic complications. Risk factors for coronary artery disease include diabetes mellitus, dyslipidemia, hypertension and male sex. Current diabetic treatment includes oral agent (monotherapy) and insulin injections. He is compliant with treatment most of the time. His weight is stable. He is following a generally healthy diet. There is no change (hgba1c 7.2 and pt follows with Dr Javier Riley, endocrine.) in his home blood glucose trend. An ACE inhibitor/angiotensin II receptor blocker is being taken. Eye exam is current. Hypertension  This is a chronic problem. The current episode started more than 1 year ago. The problem is unchanged. The problem is controlled. Pertinent negatives include no chest pain, headaches, neck pain, palpitations, peripheral edema or shortness of breath. There are no associated agents to hypertension.  Risk factors for coronary artery disease include diabetes mellitus, dyslipidemia and male gender. The current treatment provides significant improvement. Hypertensive end-organ damage includes CAD/MI. Hyperlipidemia  This is a chronic problem. The current episode started more than 1 year ago. The problem is controlled. Recent lipid tests were reviewed and are normal. Exacerbating diseases include diabetes. He has no history of hypothyroidism. Pertinent negatives include no chest pain or shortness of breath. Current antihyperlipidemic treatment includes statins. The current treatment provides significant improvement of lipids. Risk factors for coronary artery disease include dyslipidemia, diabetes mellitus, hypertension and male sex. Insomnia  This is a chronic problem. The current episode started more than 1 year ago. Episode frequency: nightly. The problem has been unchanged. Pertinent negatives include no abdominal pain, chest pain, chills, coughing, fatigue, fever, headaches, joint swelling, nausea, neck pain, rash or vomiting. Treatments tried: pt doesn't feel trazodone helped much so recently added 2 alleve pm at night and helping better. The treatment provided moderate relief. Prostate cancer - chronic and pt follows with Oncology every 3-4mos -- Dr Charmaine Bustos and gets labs through them.     Past Medical History:     Past Medical History:   Diagnosis Date    Allergic rhinitis     Allergist - Dr Myrna Rosen     CAD (coronary artery disease)     Dr Mynor Mcintosh [facility]) 2006    Prostate Cancer (Removed prostate)-- Dr Charmaine Bustos    Diabetic neuropathy [facility])     podiatry - Dr Jose C Petersen    Hyperlipidemia     Hypertension     Type II or unspecified type diabetes mellitus without mention of complication, not stated as uncontrolled       Reviewed all health maintenance requirements and ordered appropriate tests  Health Maintenance Due   Topic Date Due    Hepatitis C screen  Never done    DTaP/Tdap/Td vaccine (1 - Tdap) Never done    Shingles vaccine (1 of 2) Never done    COVID-19 Vaccine (4 - Booster for Pfizer series) 10/21/2021    Diabetic retinal exam  07/06/2022    Diabetic foot exam  11/04/2022       Past Surgical History:     Past Surgical History:   Procedure Laterality Date    CARDIAC CATHETERIZATION  01/17/2018    Lt Cardiac Cath Dr Gopal Sidhu  2014    COLONOSCOPY  2013    CYST REMOVAL      LUNG BIOPSY  2007    PROSTATE BIOPSY      TOOTH EXTRACTION  4/2014    VASECTOMY          Medications:       Prior to Admission medications    Medication Sig Start Date End Date Taking? Authorizing Provider   lisinopril (PRINIVIL;ZESTRIL) 40 MG tablet Take 40 mg by mouth daily   Yes Historical Provider, MD   magnesium gluconate (MAGONATE) 500 MG tablet Take 500 mg by mouth daily   Yes Historical Provider, MD   Multiple Vitamins-Minerals (THERAPEUTIC MULTIVITAMIN-MINERALS) tablet Take 1 tablet by mouth daily   Yes Historical Provider, MD   Methylcellulose, Laxative, (CITRUCEL PO) Take by mouth daily   Yes Historical Provider, MD   CINNAMON PO Take 2,000 mg by mouth in the morning and at bedtime   Yes Historical Provider, MD   Naproxen Sod-diphenhydrAMINE (ALEVE PM PO) Take by mouth at bedtime   Yes Historical Provider, MD   Folinic Acid-Vit B6-Vit B12 (FOLINIC-PLUS PO) Take by mouth daily   Yes Historical Provider, MD   traZODone (DESYREL) 100 MG tablet  8/27/22  Yes Historical Provider, MD   tolterodine (DETROL LA) 4 MG extended release capsule Take 1 capsule by mouth in the morning 10/17/22  Yes Mary Gale MD   clopidogrel (PLAVIX) 75 MG tablet TAKE 1 TABLET BY MOUTH ONCE DAILY 7/23/22  Yes Historical Provider, MD   atorvastatin (LIPITOR) 40 MG tablet TAKE 1 TABLET BY MOUTH ONCE DAILY 7/23/22  Yes Historical Provider, MD   pyridoxine (B-6) 50 MG tablet Take 50 mg by mouth in the morning.    Yes Historical Provider, MD   montelukast (SINGULAIR) 10 MG tablet Take 10 mg by mouth nightly 6/28/22  Yes Historical Provider, MD   metoprolol succinate (TOPROL XL) 50 MG extended release tablet TAKE 1 TABLET BY MOUTH TWICE DAILY 6/8/22  Yes Felicitas Norton MD   metFORMIN (GLUCOPHAGE) 1000 MG tablet Take 1 tablet by mouth 2 times daily (with meals) 12/4/17  Yes Glenwood Frankel, MD   bicalutamide (CASODEX) 50 MG tablet Take 50 mg by mouth daily    Yes Historical Provider, MD   docusate sodium (COLACE) 100 MG capsule Take 100 mg by mouth in the morning and 100 mg before bedtime. Patient not taking: Reported on 11/29/2022    Historical Provider, MD   Insulin Lispro Prot & Lispro (HUMALOG MIX 75/25 SC) Inject into the skin 90 in am and 80 hs    Historical Provider, MD   Glucose Blood (BLOOD GLUCOSE TEST STRIPS) STRP Use to test blood sugar twice daily as needed 12/4/17   Glenwood Frankel, MD   EPINEPHrine (EPIPEN) 0.3 MG/0.3ML SOAJ injection Inject 0.3 mg into the muscle  Patient not taking: Reported on 11/29/2022 8/5/17   Historical Provider, MD   Lancets MISC Use to test blood sugars twice daily as needed 9/20/17   Glenwood Frankel, MD   Blood Glucose Monitoring Suppl (BLOOD GLUCOSE METER) KIT Use to test blood sugar twice daily as needed 5/14/15   Glenwood Frankel, MD   leuprolide (LUPRON) 22.5 MG injection Inject 22.5 mg into the muscle. Every three months    Historical Provider, MD        Allergies:       Bee venom and Januvia [sitagliptin phosphate]    Social History:     Tobacco:    reports that he quit smoking about 37 years ago. His smoking use included cigarettes. He has a 20.00 pack-year smoking history. He has never used smokeless tobacco.  Alcohol:      reports no history of alcohol use. Drug Use:  reports no history of drug use. Family History:     Family History   Problem Relation Age of Onset    Diabetes Mother     Dementia Mother     Heart Disease Father     Diabetes Sister     Diabetes Brother        Review of Systems:       Review of Systems   Constitutional:  Negative for chills, fatigue and fever. HENT:  Negative for trouble swallowing.     Eyes:  Negative for discharge, redness and visual disturbance. Respiratory:  Negative for cough and shortness of breath. Cardiovascular:  Negative for chest pain, palpitations and leg swelling. Gastrointestinal:  Negative for abdominal pain, blood in stool, constipation, diarrhea, nausea and vomiting. Genitourinary:  Negative for dysuria and hematuria. Musculoskeletal:  Negative for joint swelling and neck pain. Skin:  Negative for rash. Neurological:  Negative for dizziness, tremors, light-headedness and headaches. Psychiatric/Behavioral:  The patient has insomnia. Physical Exam:     Physical Exam  Vitals reviewed. Constitutional:       General: He is not in acute distress. Appearance: Normal appearance. He is well-developed. He is not ill-appearing. HENT:      Head: Normocephalic and atraumatic. Eyes:      Conjunctiva/sclera: Conjunctivae normal.   Neck:      Thyroid: No thyromegaly. Vascular: No carotid bruit. Cardiovascular:      Rate and Rhythm: Normal rate and regular rhythm. Heart sounds: No murmur heard. Pulmonary:      Effort: Pulmonary effort is normal.      Breath sounds: Normal breath sounds. Abdominal:      General: There is no distension. Palpations: Abdomen is soft. Tenderness: There is no abdominal tenderness. Musculoskeletal:      Cervical back: Neck supple. Right lower leg: No edema. Left lower leg: No edema. Lymphadenopathy:      Cervical: No cervical adenopathy. Skin:     Findings: No erythema or rash. Comments: microfilament sensation intact and no lesions or sores on feet bilateral. +2 DP pulse bilateral.       Neurological:      Mental Status: He is alert and oriented to person, place, and time.    Psychiatric:         Mood and Affect: Mood normal.       Vitals:  /64   Pulse 74   Ht 5' 9\" (1.753 m)   Wt 196 lb (88.9 kg)   SpO2 96%   BMI 28.94 kg/m²       Data:     Lab Results   Component Value Date/Time     07/21/2022 12:19 PM    K 4.3 07/21/2022 12:19 PM    CL 96 07/21/2022 12:19 PM    CO2 23 07/21/2022 12:19 PM    BUN 14 07/21/2022 12:19 PM    CREATININE 1.00 07/21/2022 12:19 PM    GLUCOSE 375 07/21/2022 12:19 PM    PROT 6.7 07/07/2022 08:25 AM    LABALBU 4.2 07/07/2022 08:25 AM    BILITOT 0.61 07/07/2022 08:25 AM    ALKPHOS 56 07/07/2022 08:25 AM    AST 20 07/07/2022 08:25 AM    ALT 17 07/07/2022 08:25 AM     Lab Results   Component Value Date/Time    WBC 8.4 07/21/2022 12:19 PM    RBC 4.29 07/21/2022 12:19 PM    HGB 13.8 07/21/2022 12:19 PM    HCT 40.7 07/21/2022 12:19 PM    MCV 95.0 07/21/2022 12:19 PM    MCH 32.1 07/21/2022 12:19 PM    MCHC 33.8 07/21/2022 12:19 PM    RDW 13.3 07/21/2022 12:19 PM     07/21/2022 12:19 PM    MPV NOT REPORTED 07/10/2021 07:30 AM     Lab Results   Component Value Date/Time    TSH 1.18 07/21/2022 12:19 PM     Lab Results   Component Value Date/Time    CHOL 171 07/07/2022 08:25 AM    CHOL 123 08/03/2017 12:00 AM    HDL 33 07/07/2022 08:25 AM    PSA 0.03 09/17/2020 12:00 AM    LABA1C 7.3 07/10/2021 07:30 AM          Assessment/Plan:        1. Type 2 diabetes mellitus with diabetic neuropathy, with long-term current use of insulin (HCC)  F/U 6mos, prior CMP, Lipids, HgbA1C. Do daily foot checks and yearly eye exams  Stable on metformin and insulin and pt follows with Dr Tej Reina, endocrine    2. Essential hypertension, benign  Stable on zestril     3. Pure hypercholesterolemia  Stable on lipitor and has labs with endocrine    4. Other insomnia  Stable on trazodone and alleve PM    5. Malignant neoplasm of prostate (HCC)  Stable and follows with Dr Morgan Nicholson, oncology     6. Medicare annual wellness visit, subsequent  completed      Gurpreet Messer received counseling on the following healthy behaviors: nutrition and exercise  Reviewed prior labs and health maintenance  Continue current medications, diet and exercise. Discussed use, benefit, and side effects of prescribed medications.   Barriers to medication compliance addressed. Patient given educational materials - see patient instructions  Was a self-tracking handout given in paper form or via [employer]? Yes    Requested Prescriptions      No prescriptions requested or ordered in this encounter       All patient questions answered. Patient voiced understanding. Quality Measures    Body mass index is 28.94 kg/m². Elevated. Weight control planned discussed Healthy diet and regular exercise. BP: 132/64. Blood pressure is Normal. Treatment plan consists of No treatment change needed. Fall Risk 11/29/2022 11/23/2021 11/4/2021 11/2/2020 8/22/2019 11/27/2018 12/4/2017   2 or more falls in past year? no no no yes no no no   Fall with injury in past year? no no no no no no no     The patient does not have a history of falls. I did not - not indicated , complete a risk assessment for falls. A plan of care for falls No Treatment plan indicated    Lab Results   Component Value Date    LDLCALC 72 08/03/2017    LDLCHOLESTEROL 91 07/07/2022    LDLDIRECT 104 (H) 07/10/2021    (goal LDL reduction with dx if diabetes is 50% LDL reduction)    PHQ Scores 11/29/2022 11/23/2021 5/4/2021 11/2/2020 8/22/2019 4/9/2019 11/27/2018   PHQ2 Score 0 1 1 2 0 0 1   PHQ9 Score 0 1 1 2 0 0 1     Interpretation of Total Score Depression Severity: 1-4 = Minimal depression, 5-9 = Mild depression, 10-14 = Moderate depression, 15-19 = Moderately severe depression, 20-27 = Severe depression      Return in 6 months (on 5/29/2023) for Medicare Annual Wellness Visit in 1 year, DM, HTN, Hyperlipidemia, Hypothyroid.       Electronically signed by Natty Joseph MD on 11/29/2022 at 9:24 AM

## 2022-11-29 NOTE — PROGRESS NOTES
Medicare Annual Wellness Visit    Shahla Granado is here for Medicare AWV, Diabetes (Pt follows with Endocrine.), Hypertension, Hyperlipidemia, and Insomnia (Pt states trazodone doesn't really help him sleep better. Pt added Aleve PM, Pt thinks it may be helping. /)    Assessment & Plan   Medicare annual wellness visit, subsequent  Type 2 diabetes mellitus with diabetic neuropathy, with long-term current use of insulin ([facility] [address]  Essential hypertension, benign  Pure hypercholesterolemia  Other insomnia  Malignant neoplasm of prostate ([facility] [address]    Recommendations for Preventive Services Due: see orders and patient instructions/AVS.  Recommended screening schedule for the next 5-10 years is provided to the patient in written form: see Patient Instructions/AVS.     Return for Medicare Annual Wellness Visit in 1 year. And 6mos ck up      Subjective   The following acute and/or chronic problems were also addressed today:see 6 mos note    Patient's complete Health Risk Assessment and screening values have been reviewed and are found in Flowsheets. The following problems were reviewed today and where indicated follow up appointments were made and/or referrals ordered. Positive Risk Factor Screenings with Interventions:              Health Habits/Nutrition:  Physical Activity: Inactive    Days of Exercise per Week: 0 days    Minutes of Exercise per Session: 0 min     Have you lost any weight without trying in the past 3 months?: No  Body mass index: (!) 28.94  Have you seen the dentist within the past year?: (!) No  Health Habits/Nutrition Interventions:  Dental exam overdue:  patient declines dental evaluation, pt has dentures     Safety:  Do you have working smoke detectors?: (!) No (Pt doesn't think they work? ?)  Do you have any tripping hazards - loose or unsecured carpets or rugs?: No  Do you have any tripping hazards - clutter in doorways, halls, or stairs?: No  Do you have either shower bars, grab bars, non-slip mats or non-slip surfaces in your shower or bathtub?: Yes  Do all of your stairways have a railing or banister?: Yes  Do you always fasten your seatbelt when you are in a car?: Yes  Safety Interventions:  Pt will check smoke alarms           Objective   Vitals:    11/29/22 0842   BP: 132/64   Pulse: 74   SpO2: 96%   Weight: 196 lb (88.9 kg)   Height: 5' 9\" (1.753 m)      Body mass index is 28.94 kg/m². Allergies   Allergen Reactions    Bee Venom Hives    Januvia [Sitagliptin Phosphate]      Numbness     Prior to Visit Medications    Medication Sig Taking? Authorizing Provider   lisinopril (PRINIVIL;ZESTRIL) 40 MG tablet Take 40 mg by mouth daily Yes Historical Provider, MD   magnesium gluconate (MAGONATE) 500 MG tablet Take 500 mg by mouth daily Yes Historical Provider, MD   Multiple Vitamins-Minerals (THERAPEUTIC MULTIVITAMIN-MINERALS) tablet Take 1 tablet by mouth daily Yes Historical Provider, MD   Methylcellulose, Laxative, (CITRUCEL PO) Take by mouth daily Yes Historical Provider, MD   CINNAMON PO Take 2,000 mg by mouth in the morning and at bedtime Yes Historical Provider, MD   Naproxen Sod-diphenhydrAMINE (ALEVE PM PO) Take by mouth at bedtime Yes Historical Provider, MD   Folinic Acid-Vit B6-Vit B12 (FOLINIC-PLUS PO) Take by mouth daily Yes Historical Provider, MD   traZODone (DESYREL) 100 MG tablet  Yes Historical Provider, MD   tolterodine (DETROL LA) 4 MG extended release capsule Take 1 capsule by mouth in the morning Yes Glenwood Frankel, MD   clopidogrel (PLAVIX) 75 MG tablet TAKE 1 TABLET BY MOUTH ONCE DAILY Yes Historical Provider, MD   atorvastatin (LIPITOR) 40 MG tablet TAKE 1 TABLET BY MOUTH ONCE DAILY Yes Historical Provider, MD   pyridoxine (B-6) 50 MG tablet Take 50 mg by mouth in the morning.  Yes Historical Provider, MD   montelukast (SINGULAIR) 10 MG tablet Take 10 mg by mouth nightly Yes Historical Provider, MD   metoprolol succinate (TOPROL XL) 50 MG extended release tablet TAKE 1 TABLET BY MOUTH TWICE DAILY Yes Sumanth Ren MD   metFORMIN (GLUCOPHAGE) 1000 MG tablet Take 1 tablet by mouth 2 times daily (with meals) Yes Kael Jones MD   bicalutamide (CASODEX) 50 MG tablet Take 50 mg by mouth daily  Yes Historical Provider, MD   docusate sodium (COLACE) 100 MG capsule Take 100 mg by mouth in the morning and 100 mg before bedtime. Patient not taking: Reported on 11/29/2022  Historical Provider, MD   Insulin Lispro Prot & Lispro (HUMALOG MIX 75/25 SC) Inject into the skin 90 in am and 80 hs  Historical Provider, MD   Glucose Blood (BLOOD GLUCOSE TEST STRIPS) STRP Use to test blood sugar twice daily as needed  Kael Jones MD   EPINEPHrine (EPIPEN) 0.3 MG/0.3ML SOAJ injection Inject 0.3 mg into the muscle  Patient not taking: Reported on 11/29/2022  Historical Provider, MD   Lancets MISC Use to test blood sugars twice daily as needed  Kael Jones MD   Blood Glucose Monitoring Suppl (BLOOD GLUCOSE METER) KIT Use to test blood sugar twice daily as needed  Kael Jones MD   leuprolide (LUPRON) 22.5 MG injection Inject 22.5 mg into the muscle.  Every three months  Historical Provider, MD [place] (Including outside providers/suppliers regularly involved in providing care):   Patient Care Team:  Kael Jones MD as PCP - Fatoumata Moody MD as PCP - [facility] Empaneled Provider  Vipul Noriega MD as Consulting Physician (Hematology and Oncology)  Sumanth Ren MD as Consulting Physician (Cardiology)  Naveen Pressley MD as Consulting Physician (General Surgery)     Reviewed and updated this visit:  Tobacco  Allergies  Meds  Problems  Med Hx  Surg Hx  Soc Hx  Fam Hx

## 2022-11-29 NOTE — PATIENT INSTRUCTIONS
Personalized Preventive Plan for Te Mejia - 11/29/2022  Medicare offers a range of preventive health benefits. Some of the tests and screenings are paid in full while other may be subject to a deductible, co-insurance, and/or copay. Some of these benefits include a comprehensive review of your medical history including lifestyle, illnesses that may run in your family, and various assessments and screenings as appropriate. After reviewing your medical record and screening and assessments performed today your provider may have ordered immunizations, labs, imaging, and/or referrals for you. A list of these orders (if applicable) as well as your Preventive Care list are included within your After Visit Summary for your review. Other Preventive Recommendations:    A preventive eye exam performed by an eye specialist is recommended every 1-2 years to screen for glaucoma; cataracts, macular degeneration, and other eye disorders. A preventive dental visit is recommended every 6 months. Try to get at least 150 minutes of exercise per week or 10,000 steps per day on a pedometer . Order or download the FREE \"Exercise & Physical Activity: Your Everyday Guide\" from [employer]. Call 3-780.448.4638 or search [employer] online. You need 6283-0033 mg of calcium and 5593-1854 IU of vitamin D per day. It is possible to meet your calcium requirement with diet alone, but a vitamin D supplement is usually necessary to meet this goal.  When exposed to the sun, use a sunscreen that protects against both UVA and UVB radiation with an SPF of 30 or greater. Reapply every 2 to 3 hours or after sweating, drying off with a towel, or swimming. Always wear a seat belt when traveling in a car. Always wear a helmet when riding a bicycle or motorcycle.

## 2023-04-18 RX ORDER — TOLTERODINE 4 MG/1
4 CAPSULE, EXTENDED RELEASE ORAL DAILY
Qty: 30 CAPSULE | Refills: 1 | Status: SHIPPED | OUTPATIENT
Start: 2023-04-18

## 2023-04-18 NOTE — TELEPHONE ENCOUNTER
Last OV 11/29/22 for AWV and chronics  Requesting refill on detrol thru [facility]  Next OV 5/30/23

## 2023-05-30 ENCOUNTER — OFFICE VISIT (OUTPATIENT)
Dept: FAMILY MEDICINE CLINIC | Age: 78
End: 2023-05-30
Payer: MEDICARE

## 2023-05-30 VITALS
DIASTOLIC BLOOD PRESSURE: 60 MMHG | WEIGHT: 191.6 LBS | HEIGHT: 69 IN | HEART RATE: 66 BPM | BODY MASS INDEX: 28.38 KG/M2 | SYSTOLIC BLOOD PRESSURE: 130 MMHG | OXYGEN SATURATION: 96 %

## 2023-05-30 DIAGNOSIS — E11.40 TYPE 2 DIABETES MELLITUS WITH DIABETIC NEUROPATHY, WITH LONG-TERM CURRENT USE OF INSULIN (HCC): ICD-10-CM

## 2023-05-30 DIAGNOSIS — I10 ESSENTIAL HYPERTENSION, BENIGN: Primary | ICD-10-CM

## 2023-05-30 DIAGNOSIS — E03.9 ACQUIRED HYPOTHYROIDISM: ICD-10-CM

## 2023-05-30 DIAGNOSIS — Z79.4 TYPE 2 DIABETES MELLITUS WITH DIABETIC NEUROPATHY, WITH LONG-TERM CURRENT USE OF INSULIN (HCC): ICD-10-CM

## 2023-05-30 DIAGNOSIS — E78.00 PURE HYPERCHOLESTEROLEMIA: ICD-10-CM

## 2023-05-30 DIAGNOSIS — C61 MALIGNANT NEOPLASM OF PROSTATE (HCC): ICD-10-CM

## 2023-05-30 PROBLEM — I50.22 CHRONIC SYSTOLIC (CONGESTIVE) HEART FAILURE (HCC): Status: RESOLVED | Noted: 2022-11-29 | Resolved: 2023-05-30

## 2023-05-30 PROCEDURE — 1036F TOBACCO NON-USER: CPT | Performed by: FAMILY MEDICINE

## 2023-05-30 PROCEDURE — G8417 CALC BMI ABV UP PARAM F/U: HCPCS | Performed by: FAMILY MEDICINE

## 2023-05-30 PROCEDURE — 99214 OFFICE O/P EST MOD 30 MIN: CPT | Performed by: FAMILY MEDICINE

## 2023-05-30 PROCEDURE — 1123F ACP DISCUSS/DSCN MKR DOCD: CPT | Performed by: FAMILY MEDICINE

## 2023-05-30 PROCEDURE — G8427 DOCREV CUR MEDS BY ELIG CLIN: HCPCS | Performed by: FAMILY MEDICINE

## 2023-05-30 PROCEDURE — 3078F DIAST BP <80 MM HG: CPT | Performed by: FAMILY MEDICINE

## 2023-05-30 PROCEDURE — 3075F SYST BP GE 130 - 139MM HG: CPT | Performed by: FAMILY MEDICINE

## 2023-05-30 RX ORDER — INSULIN LISPRO 100 [IU]/ML
INJECTION, SOLUTION INTRAVENOUS; SUBCUTANEOUS
COMMUNITY
Start: 2023-05-18

## 2023-05-30 RX ORDER — TOLTERODINE 4 MG/1
4 CAPSULE, EXTENDED RELEASE ORAL DAILY
Qty: 30 CAPSULE | Refills: 5 | Status: SHIPPED | OUTPATIENT
Start: 2023-05-30

## 2023-05-30 RX ORDER — ASPIRIN 81 MG/1
81 TABLET, CHEWABLE ORAL DAILY
COMMUNITY

## 2023-05-30 SDOH — ECONOMIC STABILITY: FOOD INSECURITY: WITHIN THE PAST 12 MONTHS, THE FOOD YOU BOUGHT JUST DIDN'T LAST AND YOU DIDN'T HAVE MONEY TO GET MORE.: NEVER TRUE

## 2023-05-30 SDOH — ECONOMIC STABILITY: INCOME INSECURITY: HOW HARD IS IT FOR YOU TO PAY FOR THE VERY BASICS LIKE FOOD, HOUSING, MEDICAL CARE, AND HEATING?: NOT HARD AT ALL

## 2023-05-30 SDOH — ECONOMIC STABILITY: HOUSING INSECURITY
IN THE LAST 12 MONTHS, WAS THERE A TIME WHEN YOU DID NOT HAVE A STEADY PLACE TO SLEEP OR SLEPT IN A SHELTER (INCLUDING NOW)?: NO

## 2023-05-30 SDOH — ECONOMIC STABILITY: FOOD INSECURITY: WITHIN THE PAST 12 MONTHS, YOU WORRIED THAT YOUR FOOD WOULD RUN OUT BEFORE YOU GOT MONEY TO BUY MORE.: NEVER TRUE

## 2023-05-30 ASSESSMENT — PATIENT HEALTH QUESTIONNAIRE - PHQ9
2. FEELING DOWN, DEPRESSED OR HOPELESS: 1
5. POOR APPETITE OR OVEREATING: 0
9. THOUGHTS THAT YOU WOULD BE BETTER OFF DEAD, OR OF HURTING YOURSELF: 0
SUM OF ALL RESPONSES TO PHQ9 QUESTIONS 1 & 2: 3
SUM OF ALL RESPONSES TO PHQ QUESTIONS 1-9: 6
6. FEELING BAD ABOUT YOURSELF - OR THAT YOU ARE A FAILURE OR HAVE LET YOURSELF OR YOUR FAMILY DOWN: 0
10. IF YOU CHECKED OFF ANY PROBLEMS, HOW DIFFICULT HAVE THESE PROBLEMS MADE IT FOR YOU TO DO YOUR WORK, TAKE CARE OF THINGS AT HOME, OR GET ALONG WITH OTHER PEOPLE: 0
SUM OF ALL RESPONSES TO PHQ QUESTIONS 1-9: 6
3. TROUBLE FALLING OR STAYING ASLEEP: 1
7. TROUBLE CONCENTRATING ON THINGS, SUCH AS READING THE NEWSPAPER OR WATCHING TELEVISION: 1
4. FEELING TIRED OR HAVING LITTLE ENERGY: 1
8. MOVING OR SPEAKING SO SLOWLY THAT OTHER PEOPLE COULD HAVE NOTICED. OR THE OPPOSITE, BEING SO FIGETY OR RESTLESS THAT YOU HAVE BEEN MOVING AROUND A LOT MORE THAN USUAL: 0
SUM OF ALL RESPONSES TO PHQ QUESTIONS 1-9: 6
1. LITTLE INTEREST OR PLEASURE IN DOING THINGS: 2
SUM OF ALL RESPONSES TO PHQ QUESTIONS 1-9: 6

## 2023-05-30 ASSESSMENT — ENCOUNTER SYMPTOMS
DIARRHEA: 0
NAUSEA: 0
ABDOMINAL PAIN: 0
EYE DISCHARGE: 0
VOMITING: 0
TROUBLE SWALLOWING: 0
SHORTNESS OF BREATH: 0
BLOOD IN STOOL: 0
COUGH: 0
EYE REDNESS: 0

## 2023-05-30 NOTE — PATIENT INSTRUCTIONS
[employer] SURVEY:    You may be receiving a survey from [employer] regarding your visit today. You may get this in the mail, through your MyChart or in your email. Please complete the survey to enable us to provide the highest quality of care to you and your family. If you cannot score us as very good ( 5 Stars) on any question, please feel free to call the office to discuss how we could have made your experience exceptional.     Thank you.     Clinical Care Team:   MD Olinda Eden, [facility]                                     Triage: Esthela Alegria, [address] Team:  Nell Whitten

## 2023-05-30 NOTE — PROGRESS NOTES
HPI Notes    Name: Zahra Salter  : 1945        Chief Complaint:     Chief Complaint   Patient presents with    Diabetes     6 month f/u, Labs 2023 A1C 7.8    Hypertension    Hyperlipidemia    Hypothyroidism       History of Present Illness:     Zahra Salter is a 70 y.o.  male who presents with Diabetes (6 month f/u, Labs 2023 A1C 7.8), Hypertension, Hyperlipidemia, and Hypothyroidism      Diabetes  He presents for his follow-up diabetic visit. He has type 2 diabetes mellitus. His disease course has been stable. Pertinent negatives for hypoglycemia include no dizziness, headaches or tremors. Pertinent negatives for diabetes include no chest pain and no fatigue. There are no hypoglycemic complications. Symptoms are stable. There are no diabetic complications. Risk factors for coronary artery disease include diabetes mellitus, dyslipidemia, hypertension and male sex. He is compliant with treatment most of the time. His weight is stable. He is following a generally healthy diet. He participates in exercise intermittently (pt needs to walk more with his dog). There is no change in his home blood glucose trend. An ACE inhibitor/angiotensin II receptor blocker is being taken. Hypertension  This is a chronic problem. The current episode started more than 1 year ago. The problem is unchanged. The problem is controlled. Pertinent negatives include no chest pain, headaches, neck pain, palpitations, peripheral edema or shortness of breath. Risk factors for coronary artery disease include dyslipidemia and male gender. The current treatment provides significant improvement. Hyperlipidemia  This is a chronic problem. The current episode started more than 1 year ago. The problem is controlled. Recent lipid tests were reviewed and are normal. Exacerbating diseases include diabetes. He has no history of hypothyroidism. Pertinent negatives include no chest pain or shortness of breath.  Current

## 2023-10-05 ENCOUNTER — TELEPHONE (OUTPATIENT)
Dept: FAMILY MEDICINE CLINIC | Age: 78
End: 2023-10-05

## 2023-10-05 ENCOUNTER — IMMUNIZATION (OUTPATIENT)
Dept: FAMILY MEDICINE CLINIC | Age: 78
End: 2023-10-05

## 2023-10-05 DIAGNOSIS — Z23 NEED FOR INFLUENZA VACCINATION: Primary | ICD-10-CM

## 2023-10-05 NOTE — TELEPHONE ENCOUNTER
Have pt ask his oncologist as we don't have the shot here any ways so get flu today and then in 2wks if oncology allows he may get COVID shot

## 2023-10-05 NOTE — TELEPHONE ENCOUNTER
Patient asking if he should get the new Covid Booster - unsure if patient should check with his Oncologist - patient coming in this afternoon for his flu shot - please let him know at that time

## 2023-11-30 SDOH — HEALTH STABILITY: PHYSICAL HEALTH: ON AVERAGE, HOW MANY DAYS PER WEEK DO YOU ENGAGE IN MODERATE TO STRENUOUS EXERCISE (LIKE A BRISK WALK)?: 4 DAYS

## 2023-11-30 ASSESSMENT — PATIENT HEALTH QUESTIONNAIRE - PHQ9
SUM OF ALL RESPONSES TO PHQ QUESTIONS 1-9: 2
SUM OF ALL RESPONSES TO PHQ QUESTIONS 1-9: 2
1. LITTLE INTEREST OR PLEASURE IN DOING THINGS: 1
SUM OF ALL RESPONSES TO PHQ9 QUESTIONS 1 & 2: 2
SUM OF ALL RESPONSES TO PHQ QUESTIONS 1-9: 2
2. FEELING DOWN, DEPRESSED OR HOPELESS: 1
SUM OF ALL RESPONSES TO PHQ QUESTIONS 1-9: 2

## 2023-11-30 ASSESSMENT — LIFESTYLE VARIABLES
HOW OFTEN DO YOU HAVE A DRINK CONTAINING ALCOHOL: NEVER
HOW MANY STANDARD DRINKS CONTAINING ALCOHOL DO YOU HAVE ON A TYPICAL DAY: PATIENT DOES NOT DRINK
HOW OFTEN DO YOU HAVE SIX OR MORE DRINKS ON ONE OCCASION: 1
HOW MANY STANDARD DRINKS CONTAINING ALCOHOL DO YOU HAVE ON A TYPICAL DAY: 0
HOW OFTEN DO YOU HAVE A DRINK CONTAINING ALCOHOL: 1

## 2023-12-04 ENCOUNTER — OFFICE VISIT (OUTPATIENT)
Dept: FAMILY MEDICINE CLINIC | Age: 78
End: 2023-12-04
Payer: MEDICARE

## 2023-12-04 VITALS
HEART RATE: 74 BPM | OXYGEN SATURATION: 96 % | HEIGHT: 69 IN | BODY MASS INDEX: 29.49 KG/M2 | DIASTOLIC BLOOD PRESSURE: 54 MMHG | SYSTOLIC BLOOD PRESSURE: 118 MMHG | WEIGHT: 199.1 LBS

## 2023-12-04 DIAGNOSIS — Z79.4 TYPE 2 DIABETES MELLITUS WITH DIABETIC NEUROPATHY, WITH LONG-TERM CURRENT USE OF INSULIN (HCC): ICD-10-CM

## 2023-12-04 DIAGNOSIS — F43.29 GRIEF REACTION WITH PROLONGED BEREAVEMENT: ICD-10-CM

## 2023-12-04 DIAGNOSIS — E11.40 TYPE 2 DIABETES MELLITUS WITH DIABETIC NEUROPATHY, WITH LONG-TERM CURRENT USE OF INSULIN (HCC): ICD-10-CM

## 2023-12-04 DIAGNOSIS — E78.00 PURE HYPERCHOLESTEROLEMIA: ICD-10-CM

## 2023-12-04 DIAGNOSIS — K21.9 GASTROESOPHAGEAL REFLUX DISEASE WITHOUT ESOPHAGITIS: ICD-10-CM

## 2023-12-04 DIAGNOSIS — I10 ESSENTIAL HYPERTENSION, BENIGN: ICD-10-CM

## 2023-12-04 DIAGNOSIS — Z00.00 MEDICARE ANNUAL WELLNESS VISIT, SUBSEQUENT: Primary | ICD-10-CM

## 2023-12-04 DIAGNOSIS — E03.9 ACQUIRED HYPOTHYROIDISM: ICD-10-CM

## 2023-12-04 PROCEDURE — G8417 CALC BMI ABV UP PARAM F/U: HCPCS | Performed by: FAMILY MEDICINE

## 2023-12-04 PROCEDURE — 3074F SYST BP LT 130 MM HG: CPT | Performed by: FAMILY MEDICINE

## 2023-12-04 PROCEDURE — 1123F ACP DISCUSS/DSCN MKR DOCD: CPT | Performed by: FAMILY MEDICINE

## 2023-12-04 PROCEDURE — G0439 PPPS, SUBSEQ VISIT: HCPCS | Performed by: FAMILY MEDICINE

## 2023-12-04 PROCEDURE — G8484 FLU IMMUNIZE NO ADMIN: HCPCS | Performed by: FAMILY MEDICINE

## 2023-12-04 PROCEDURE — G8427 DOCREV CUR MEDS BY ELIG CLIN: HCPCS | Performed by: FAMILY MEDICINE

## 2023-12-04 PROCEDURE — 1036F TOBACCO NON-USER: CPT | Performed by: FAMILY MEDICINE

## 2023-12-04 PROCEDURE — 99214 OFFICE O/P EST MOD 30 MIN: CPT | Performed by: FAMILY MEDICINE

## 2023-12-04 PROCEDURE — 3078F DIAST BP <80 MM HG: CPT | Performed by: FAMILY MEDICINE

## 2023-12-04 RX ORDER — ESCITALOPRAM OXALATE 10 MG/1
10 TABLET ORAL DAILY
Qty: 30 TABLET | Refills: 1 | Status: SHIPPED | OUTPATIENT
Start: 2023-12-04

## 2023-12-04 RX ORDER — LEVOCETIRIZINE DIHYDROCHLORIDE 5 MG/1
5 TABLET, FILM COATED ORAL DAILY
COMMUNITY
Start: 2023-11-24

## 2023-12-04 RX ORDER — ISOSORBIDE MONONITRATE 30 MG/1
30 TABLET, EXTENDED RELEASE ORAL EVERY MORNING
COMMUNITY
Start: 2023-11-15

## 2023-12-04 ASSESSMENT — ENCOUNTER SYMPTOMS
COUGH: 0
DIARRHEA: 0
NAUSEA: 0
VOMITING: 0
CHOKING: 0
HEARTBURN: 1
CONSTIPATION: 0
BLOOD IN STOOL: 0
SHORTNESS OF BREATH: 0
EYE DISCHARGE: 0
ABDOMINAL PAIN: 0
EYE REDNESS: 0
TROUBLE SWALLOWING: 0

## 2023-12-04 NOTE — PROGRESS NOTES
Medicare Annual Wellness Visit    Jackie Santos is here for Medicare AWV (Pt presents today for his AWV), Diabetes (6 month f/u), Hypertension, Hyperlipidemia, and Hypothyroidism    Assessment & Plan   Medicare annual wellness visit, subsequent  Type 2 diabetes mellitus with diabetic neuropathy, with long-term current use of insulin ([address])  Essential hypertension, benign  Pure hypercholesterolemia  Gastroesophageal reflux disease without esophagitis  Acquired hypothyroidism  Grief reaction with prolonged bereavement    Recommendations for Preventive Services Due: see orders and patient instructions/AVS.  Recommended screening schedule for the next 5-10 years is provided to the patient in written form: see Patient Instructions/AVS.     Return in about 6 months (around 6/4/2024) for DM, HTN, Hyperlipidemia, Hypothyroid, gerd. Subjective   The following acute and/or chronic problems were also addressed today: see 6mos note     Patient's complete Health Risk Assessment and screening values have been reviewed and are found in Flowsheets. The following problems were reviewed today and where indicated follow up appointments were made and/or referrals ordered.     Positive Risk Factor Screenings with Interventions:               General HRA Questions:  Select all that apply: (!) Social Isolation    Social Isolation Interventions:  Pt has daughters close but still lonely since wife passed         Dentist Screen:  Have you seen the dentist within the past year?: (!) No    Intervention:  Patient declines any further evaluation or treatment      Safety:  Do you have any tripping hazards - loose or unsecured carpets or rugs?: (!) Yes    Interventions:  Patient declined any further interventions or treatment               Objective   Vitals:    12/04/23 1016   BP: (!) 118/54   Site: Right Upper Arm   Position: Sitting   Cuff Size: Large Adult   Pulse: 74   SpO2: 96%   Weight: 90.3 kg (199 lb 1.6 oz)   Height: 1.753 m (5' 9\")
injury in past year? no no no no no no no     The patient does not have a history of falls. I did not - not indicated , complete a risk assessment for falls. A plan of care for falls No Treatment plan indicated    Lab Results   Component Value Date    LDLCALC 72 08/03/2017    LDLCHOLESTEROL 91 07/07/2022    LDLDIRECT 104 (H) 07/10/2021    (goal LDL reduction with dx if diabetes is 50% LDL reduction)        11/30/2023     3:48 PM 5/30/2023     9:12 AM 11/29/2022     8:54 AM 11/23/2021     1:46 PM 5/4/2021     7:59 AM 11/2/2020     9:46 AM 8/22/2019     8:41 AM   PHQ Scores   PHQ2 Score 2 3 0 1 1 2 0   PHQ9 Score 2 6 0 1 1 2 0     Interpretation of Total Score Depression Severity: 1-4 = Minimal depression, 5-9 = Mild depression, 10-14 = Moderate depression, 15-19 = Moderately severe depression, 20-27 = Severe depression      Return in about 6 months (around 6/4/2024) for DM, HTN, Hyperlipidemia, Hypothyroid, gerd.       Electronically signed by Katharina Moreno MD on 12/4/2023 at 11:03 AM

## 2024-01-03 ENCOUNTER — OFFICE VISIT (OUTPATIENT)
Dept: FAMILY MEDICINE CLINIC | Age: 79
End: 2024-01-03
Payer: MEDICARE

## 2024-01-03 VITALS
WEIGHT: 189 LBS | HEART RATE: 70 BPM | BODY MASS INDEX: 27.91 KG/M2 | DIASTOLIC BLOOD PRESSURE: 60 MMHG | SYSTOLIC BLOOD PRESSURE: 112 MMHG | OXYGEN SATURATION: 97 % | TEMPERATURE: 97.8 F

## 2024-01-03 DIAGNOSIS — F43.29 GRIEF REACTION WITH PROLONGED BEREAVEMENT: Primary | ICD-10-CM

## 2024-01-03 PROCEDURE — G8417 CALC BMI ABV UP PARAM F/U: HCPCS | Performed by: FAMILY MEDICINE

## 2024-01-03 PROCEDURE — 3078F DIAST BP <80 MM HG: CPT | Performed by: FAMILY MEDICINE

## 2024-01-03 PROCEDURE — 99213 OFFICE O/P EST LOW 20 MIN: CPT | Performed by: FAMILY MEDICINE

## 2024-01-03 PROCEDURE — 1123F ACP DISCUSS/DSCN MKR DOCD: CPT | Performed by: FAMILY MEDICINE

## 2024-01-03 PROCEDURE — G8427 DOCREV CUR MEDS BY ELIG CLIN: HCPCS | Performed by: FAMILY MEDICINE

## 2024-01-03 PROCEDURE — G8484 FLU IMMUNIZE NO ADMIN: HCPCS | Performed by: FAMILY MEDICINE

## 2024-01-03 PROCEDURE — 3074F SYST BP LT 130 MM HG: CPT | Performed by: FAMILY MEDICINE

## 2024-01-03 PROCEDURE — 1036F TOBACCO NON-USER: CPT | Performed by: FAMILY MEDICINE

## 2024-01-03 RX ORDER — BLOOD-GLUCOSE,RECEIVER,CONT
1 EACH MISCELLANEOUS
COMMUNITY
Start: 2023-09-28

## 2024-01-03 RX ORDER — INSULIN DEGLUDEC INJECTION 100 U/ML
INJECTION, SOLUTION SUBCUTANEOUS
COMMUNITY
Start: 2023-10-09

## 2024-01-03 RX ORDER — ESCITALOPRAM OXALATE 10 MG/1
10 TABLET ORAL DAILY
Qty: 30 TABLET | Refills: 4 | Status: SHIPPED | OUTPATIENT
Start: 2024-01-03

## 2024-01-03 ASSESSMENT — ENCOUNTER SYMPTOMS
DIARRHEA: 0
SHORTNESS OF BREATH: 0
COUGH: 0
VOMITING: 0
BLOOD IN STOOL: 0
ABDOMINAL PAIN: 0
NAUSEA: 0

## 2024-01-03 ASSESSMENT — PATIENT HEALTH QUESTIONNAIRE - PHQ9
SUM OF ALL RESPONSES TO PHQ9 QUESTIONS 1 & 2: 0
SUM OF ALL RESPONSES TO PHQ QUESTIONS 1-9: 0
SUM OF ALL RESPONSES TO PHQ QUESTIONS 1-9: 0
1. LITTLE INTEREST OR PLEASURE IN DOING THINGS: 0
SUM OF ALL RESPONSES TO PHQ QUESTIONS 1-9: 0
2. FEELING DOWN, DEPRESSED OR HOPELESS: 0
SUM OF ALL RESPONSES TO PHQ QUESTIONS 1-9: 0

## 2024-01-03 NOTE — PROGRESS NOTES
Gastrointestinal:  Negative for abdominal pain, blood in stool, diarrhea, nausea and vomiting.   Skin:  Negative for rash.   Neurological:  Negative for dizziness, facial asymmetry and headaches.   Psychiatric/Behavioral:  Negative for dysphoric mood and hallucinations. The patient does not have insomnia.          Physical Exam:     Physical Exam  Vitals reviewed.   Constitutional:       General: He is not in acute distress.     Appearance: Normal appearance. He is well-developed. He is not ill-appearing.   HENT:      Head: Normocephalic and atraumatic.   Eyes:      General:         Right eye: No discharge.         Left eye: No discharge.      Conjunctiva/sclera: Conjunctivae normal.   Neck:      Thyroid: No thyromegaly.   Cardiovascular:      Rate and Rhythm: Normal rate and regular rhythm.      Heart sounds: No murmur heard.  Pulmonary:      Effort: Pulmonary effort is normal.      Breath sounds: Normal breath sounds.   Abdominal:      General: Bowel sounds are normal. There is no distension.      Palpations: Abdomen is soft.      Tenderness: There is no abdominal tenderness.   Musculoskeletal:      Cervical back: Neck supple.      Right lower leg: No edema.      Left lower leg: No edema.   Skin:     Findings: No erythema or rash.   Neurological:      Mental Status: He is alert.   Psychiatric:         Mood and Affect: Mood and affect normal.         Behavior: Behavior normal.      Comments: Pt is more smiling and laughing          Vitals:  /60   Pulse 70   Temp 97.8 °F (36.6 °C) (Oral)   Wt 85.7 kg (189 lb)   SpO2 97%   BMI 27.91 kg/m²       Data:     Lab Results   Component Value Date/Time     07/21/2022 12:19 PM    K 4.3 07/21/2022 12:19 PM    CL 96 07/21/2022 12:19 PM    CO2 23 07/21/2022 12:19 PM    BUN 14 07/21/2022 12:19 PM    CREATININE 1.00 07/21/2022 12:19 PM    GLUCOSE 375 07/21/2022 12:19 PM    PROT 6.7 07/07/2022 08:25 AM    LABALBU 4.2 07/07/2022 08:25 AM    BILITOT 0.61 07/07/2022

## 2024-03-14 ENCOUNTER — OFFICE VISIT (OUTPATIENT)
Dept: FAMILY MEDICINE CLINIC | Age: 79
End: 2024-03-14
Payer: MEDICARE

## 2024-03-14 ENCOUNTER — HOSPITAL ENCOUNTER (OUTPATIENT)
Age: 79
Discharge: HOME | End: 2024-03-14
Payer: MEDICARE

## 2024-03-14 VITALS
SYSTOLIC BLOOD PRESSURE: 110 MMHG | OXYGEN SATURATION: 97 % | TEMPERATURE: 97.7 F | HEART RATE: 74 BPM | WEIGHT: 194 LBS | BODY MASS INDEX: 28.65 KG/M2 | DIASTOLIC BLOOD PRESSURE: 68 MMHG

## 2024-03-14 DIAGNOSIS — R79.89 ELEVATED LFTS: Primary | ICD-10-CM

## 2024-03-14 PROBLEM — K65.1 POSTPROCEDURAL INTRAABDOMINAL ABSCESS (HCC): Status: ACTIVE | Noted: 2024-03-01

## 2024-03-14 PROBLEM — T81.43XA POSTPROCEDURAL INTRAABDOMINAL ABSCESS: Status: ACTIVE | Noted: 2024-03-01

## 2024-03-14 PROBLEM — R74.01 TRANSAMINITIS: Status: ACTIVE | Noted: 2024-03-03

## 2024-03-14 LAB
ALBUMIN SERPL-MCNC: 3.9 G/DL (ref 3.5–5.2)
ALP SERPL-CCNC: 82 U/L (ref 40–129)
ALT SERPL-CCNC: 23 U/L (ref 5–41)
AST SERPL-CCNC: 23 U/L
BILIRUB DIRECT SERPL-MCNC: 0.3 MG/DL
BILIRUB INDIRECT SERPL-MCNC: 0.2 MG/DL (ref 0–1)
BILIRUB SERPL-MCNC: 0.5 MG/DL (ref 0.3–1.2)
PROT SERPL-MCNC: 6.6 G/DL (ref 6.4–8.3)

## 2024-03-14 PROCEDURE — 3074F SYST BP LT 130 MM HG: CPT | Performed by: FAMILY MEDICINE

## 2024-03-14 PROCEDURE — 1036F TOBACCO NON-USER: CPT | Performed by: FAMILY MEDICINE

## 2024-03-14 PROCEDURE — G8484 FLU IMMUNIZE NO ADMIN: HCPCS | Performed by: FAMILY MEDICINE

## 2024-03-14 PROCEDURE — 1123F ACP DISCUSS/DSCN MKR DOCD: CPT | Performed by: FAMILY MEDICINE

## 2024-03-14 PROCEDURE — 99213 OFFICE O/P EST LOW 20 MIN: CPT | Performed by: FAMILY MEDICINE

## 2024-03-14 PROCEDURE — 36415 COLL VENOUS BLD VENIPUNCTURE: CPT

## 2024-03-14 PROCEDURE — 80076 HEPATIC FUNCTION PANEL: CPT

## 2024-03-14 PROCEDURE — G8427 DOCREV CUR MEDS BY ELIG CLIN: HCPCS | Performed by: FAMILY MEDICINE

## 2024-03-14 PROCEDURE — 3078F DIAST BP <80 MM HG: CPT | Performed by: FAMILY MEDICINE

## 2024-03-14 PROCEDURE — G8417 CALC BMI ABV UP PARAM F/U: HCPCS | Performed by: FAMILY MEDICINE

## 2024-03-14 ASSESSMENT — ENCOUNTER SYMPTOMS
VOMITING: 0
EYE REDNESS: 0
SHORTNESS OF BREATH: 0
BLOOD IN STOOL: 0
COUGH: 0
TROUBLE SWALLOWING: 0
DIARRHEA: 0
EYE DISCHARGE: 0
NAUSEA: 0
ABDOMINAL PAIN: 0

## 2024-03-14 NOTE — PROGRESS NOTES
HPI Notes    Name: Ge Swan  : 1945        Chief Complaint:     Chief Complaint   Patient presents with    Follow-Up from Hospital     Patient here today for hospital follow up. Was admitted on 3/1/24 and discharged on 3/4/24 for hepatic abscess. Patient said he is feeling better now.        History of Present Illness:     Ge Swan is a 79 y.o.  male who presents with Follow-Up from Hospital (Patient here today for hospital follow up. Was admitted on 3/1/24 and discharged on 3/4/24 for hepatic abscess. Patient said he is feeling better now. )      HPI  Elevated LFTs - pt had his gallbladder removed on Dec 24th, 2023. Pt had been doing well. Then on  at noon after having good breakfast he started with Nausea and vomiting and middle abdominal pain. Pt continued to feel bad into  so went to [place] [facility].  Pt admitted to monitor and sugars were higher too. Pt was NPO and given IVF. Pt transferred to [place].  CT scan pt has some fatty liver and mildly enlarged liver. Also possible liver abscess. Elevated LFTs. No F/C.  Pt's liver test went back down and pt was eating on  and advanced diet on  and went home.  Pt has been home and doing well. Pt is eating well. No N/V/F/C. No abdominal pain since he has been home. Bowels are back to \"pt's normal\".  Pt feeling good with good appetite.  Pt is due for f/u LFT labs.  Pt is still taking the Augmentin. No new concerns. Pt states sugars a little high only in AM but otherwise better  Possible liver abscess - on CT scan pt had showed liver abscess? But on review of records they thought pt had a retained gallbladder stone. Pt told to f/u  2wks for repeat LFTs. No F/C/N/V. Pt is eating well. Pt got better MRCP and u/s of abdomen all showed NO abscess     Past Medical History:     Past Medical History:   Diagnosis Date    Allergic rhinitis     Allergist - Dr Aguilera    Arthritis     CAD (coronary artery disease)     Dr Marie

## 2024-06-03 ENCOUNTER — OFFICE VISIT (OUTPATIENT)
Dept: FAMILY MEDICINE CLINIC | Age: 79
End: 2024-06-03
Payer: MEDICARE

## 2024-06-03 VITALS
BODY MASS INDEX: 29.24 KG/M2 | OXYGEN SATURATION: 96 % | DIASTOLIC BLOOD PRESSURE: 60 MMHG | WEIGHT: 198 LBS | HEART RATE: 67 BPM | SYSTOLIC BLOOD PRESSURE: 128 MMHG

## 2024-06-03 DIAGNOSIS — Z79.4 TYPE 2 DIABETES MELLITUS WITH DIABETIC NEUROPATHY, WITH LONG-TERM CURRENT USE OF INSULIN (HCC): Primary | ICD-10-CM

## 2024-06-03 DIAGNOSIS — E11.40 TYPE 2 DIABETES MELLITUS WITH DIABETIC NEUROPATHY, WITH LONG-TERM CURRENT USE OF INSULIN (HCC): Primary | ICD-10-CM

## 2024-06-03 DIAGNOSIS — I10 ESSENTIAL HYPERTENSION, BENIGN: ICD-10-CM

## 2024-06-03 DIAGNOSIS — C61 MALIGNANT NEOPLASM OF PROSTATE (HCC): ICD-10-CM

## 2024-06-03 DIAGNOSIS — E78.00 PURE HYPERCHOLESTEROLEMIA: ICD-10-CM

## 2024-06-03 DIAGNOSIS — F43.29 GRIEF REACTION WITH PROLONGED BEREAVEMENT: ICD-10-CM

## 2024-06-03 LAB — HBA1C MFR BLD: 8.3 %

## 2024-06-03 PROCEDURE — 3078F DIAST BP <80 MM HG: CPT | Performed by: FAMILY MEDICINE

## 2024-06-03 PROCEDURE — G8417 CALC BMI ABV UP PARAM F/U: HCPCS | Performed by: FAMILY MEDICINE

## 2024-06-03 PROCEDURE — 99214 OFFICE O/P EST MOD 30 MIN: CPT | Performed by: FAMILY MEDICINE

## 2024-06-03 PROCEDURE — G8427 DOCREV CUR MEDS BY ELIG CLIN: HCPCS | Performed by: FAMILY MEDICINE

## 2024-06-03 PROCEDURE — 1123F ACP DISCUSS/DSCN MKR DOCD: CPT | Performed by: FAMILY MEDICINE

## 2024-06-03 PROCEDURE — 3074F SYST BP LT 130 MM HG: CPT | Performed by: FAMILY MEDICINE

## 2024-06-03 PROCEDURE — 3052F HG A1C>EQUAL 8.0%<EQUAL 9.0%: CPT | Performed by: FAMILY MEDICINE

## 2024-06-03 PROCEDURE — 1036F TOBACCO NON-USER: CPT | Performed by: FAMILY MEDICINE

## 2024-06-03 PROCEDURE — 83036 HEMOGLOBIN GLYCOSYLATED A1C: CPT | Performed by: FAMILY MEDICINE

## 2024-06-03 RX ORDER — ESCITALOPRAM OXALATE 20 MG/1
20 TABLET ORAL DAILY
Qty: 30 TABLET | Refills: 5 | Status: SHIPPED | OUTPATIENT
Start: 2024-06-03

## 2024-06-03 SDOH — ECONOMIC STABILITY: FOOD INSECURITY: WITHIN THE PAST 12 MONTHS, THE FOOD YOU BOUGHT JUST DIDN'T LAST AND YOU DIDN'T HAVE MONEY TO GET MORE.: NEVER TRUE

## 2024-06-03 SDOH — ECONOMIC STABILITY: FOOD INSECURITY: WITHIN THE PAST 12 MONTHS, YOU WORRIED THAT YOUR FOOD WOULD RUN OUT BEFORE YOU GOT MONEY TO BUY MORE.: NEVER TRUE

## 2024-06-03 SDOH — ECONOMIC STABILITY: INCOME INSECURITY: HOW HARD IS IT FOR YOU TO PAY FOR THE VERY BASICS LIKE FOOD, HOUSING, MEDICAL CARE, AND HEATING?: NOT HARD AT ALL

## 2024-06-03 ASSESSMENT — ENCOUNTER SYMPTOMS
DIARRHEA: 0
EYE DISCHARGE: 0
ABDOMINAL PAIN: 0
TROUBLE SWALLOWING: 0
EYE REDNESS: 0
NAUSEA: 0
BLOOD IN STOOL: 0
COUGH: 0
VOMITING: 0

## 2024-06-03 NOTE — PROGRESS NOTES
reduction with dx if diabetes is 50% LDL reduction)        1/3/2024     1:10 PM 11/30/2023     3:48 PM 5/30/2023     9:12 AM 11/29/2022     8:54 AM 11/23/2021     1:46 PM 5/4/2021     7:59 AM 11/2/2020     9:46 AM   PHQ Scores   PHQ2 Score 0 2 3 0 1 1 2   PHQ9 Score 0 2 6 0 1 1 2     Interpretation of Total Score Depression Severity: 1-4 = Minimal depression, 5-9 = Mild depression, 10-14 = Moderate depression, 15-19 = Moderately severe depression, 20-27 = Severe depression      Return in about 6 months (around 12/3/2024) for DM, HTN, Hyperlipidemia, medicare wellness .      Electronically signed by Sayra Guadarrama MD on 6/3/2024 at 11:43 AM

## 2024-06-24 RX ORDER — ESCITALOPRAM OXALATE 10 MG/1
10 TABLET ORAL DAILY
Qty: 30 TABLET | Refills: 0 | Status: SHIPPED | OUTPATIENT
Start: 2024-06-24

## 2024-06-24 NOTE — TELEPHONE ENCOUNTER
Last OV 6/3/24     Next OV 12/05/24     Requesting refill on escitalopram thru [facility].  Rx pending

## 2024-07-30 ENCOUNTER — TELEPHONE (OUTPATIENT)
Dept: FAMILY MEDICINE CLINIC | Age: 79
End: 2024-07-30

## 2024-07-30 NOTE — TELEPHONE ENCOUNTER
Patient needs to get new supplies for his CPAP - states that he has not been using it - states that it has been pretty much since the wife passed away because he just didn't feel like he wanted to live without her - last order was through [facility] - he did go over to try to get supplies but they told him doctor would need to order new equipment for him

## 2024-07-30 NOTE — TELEPHONE ENCOUNTER
OV scheduled for patient to discuss if he needs new order for sleep study.  He has not used cpap in over 2 years.     Medicare guidelines require OV and new study

## 2024-08-21 ENCOUNTER — OFFICE VISIT (OUTPATIENT)
Dept: FAMILY MEDICINE CLINIC | Age: 79
End: 2024-08-21

## 2024-08-21 VITALS
DIASTOLIC BLOOD PRESSURE: 68 MMHG | BODY MASS INDEX: 29.53 KG/M2 | WEIGHT: 200 LBS | HEART RATE: 64 BPM | OXYGEN SATURATION: 97 % | SYSTOLIC BLOOD PRESSURE: 130 MMHG

## 2024-08-21 DIAGNOSIS — G47.33 OSA ON CPAP: Primary | ICD-10-CM

## 2024-08-21 ASSESSMENT — ENCOUNTER SYMPTOMS
ABDOMINAL PAIN: 0
COUGH: 0
NAUSEA: 0
SHORTNESS OF BREATH: 0
VOMITING: 0
EYE DISCHARGE: 0
EYE REDNESS: 0

## 2024-08-21 NOTE — PATIENT INSTRUCTIONS
SURVEY:    You may be receiving a survey from [facility] regarding your visit today.    Please complete the survey to enable us to provide the highest quality of care to you and your family.    If you cannot score us a very good (5 Stars) on any question, please call the office to discuss how we could have made your experience a very good one.    Thank you.    Clinical Care Team: MD Marshall Elkins LPN              Triage: Mara Zimmer CMA              Clerical Team: Mara Hassan

## 2024-08-21 NOTE — PROGRESS NOTES
disturbance.          Physical Exam:     Physical Exam  Vitals reviewed.   Constitutional:       General: He is not in acute distress.     Appearance: Normal appearance. He is normal weight. He is not ill-appearing.   HENT:      Head: Normocephalic and atraumatic.   Eyes:      General:         Right eye: No discharge.         Left eye: No discharge.   Neck:      Vascular: No carotid bruit.   Cardiovascular:      Rate and Rhythm: Normal rate and regular rhythm.      Heart sounds: Normal heart sounds.   Pulmonary:      Effort: Pulmonary effort is normal. No respiratory distress.      Breath sounds: Normal breath sounds. No wheezing or rhonchi.   Abdominal:      General: There is no distension.      Tenderness: There is no abdominal tenderness.   Musculoskeletal:      Cervical back: Neck supple.   Neurological:      Mental Status: He is alert.   Psychiatric:         Mood and Affect: Mood normal.         Vitals:  /68   Pulse 64   Wt 90.7 kg (200 lb)   SpO2 97%   BMI 29.53 kg/m²       Data:     Lab Results   Component Value Date/Time     07/21/2022 12:19 PM    K 4.3 07/21/2022 12:19 PM    CL 96 07/21/2022 12:19 PM    CO2 23 07/21/2022 12:19 PM    BUN 14 07/21/2022 12:19 PM    CREATININE 1.00 07/21/2022 12:19 PM    GLUCOSE 375 07/21/2022 12:19 PM    BILITOT 0.5 03/14/2024 10:31 AM    ALKPHOS 82 03/14/2024 10:31 AM    AST 23 03/14/2024 10:31 AM    ALT 23 03/14/2024 10:31 AM     Lab Results   Component Value Date/Time    WBC 8.4 07/21/2022 12:19 PM    RBC 4.29 07/21/2022 12:19 PM    HGB 13.8 07/21/2022 12:19 PM    HCT 40.7 07/21/2022 12:19 PM    MCV 95.0 07/21/2022 12:19 PM    MCH 32.1 07/21/2022 12:19 PM    MCHC 33.8 07/21/2022 12:19 PM    RDW 13.3 07/21/2022 12:19 PM     07/21/2022 12:19 PM    MPV NOT REPORTED 07/10/2021 07:30 AM     Lab Results   Component Value Date/Time    TSH 1.18 07/21/2022 12:19 PM     Lab Results   Component Value Date/Time    CHOL 171 07/07/2022 08:25 AM    CHOL 123

## 2024-09-06 ENCOUNTER — TELEPHONE (OUTPATIENT)
Dept: FAMILY MEDICINE CLINIC | Age: 79
End: 2024-09-06

## 2024-09-06 NOTE — TELEPHONE ENCOUNTER
Pt called stating that he has a nose mask for his sleep apnea.pt stated when he rolls over it pulls off the nose.  Called [employer] phone [phone].  Spoke with Sonia she is going to have a rep to call pt.

## 2024-11-11 RX ORDER — ESCITALOPRAM OXALATE 20 MG/1
20 TABLET ORAL DAILY
Qty: 30 TABLET | Refills: 0 | Status: SHIPPED | OUTPATIENT
Start: 2024-11-11

## 2024-11-11 NOTE — TELEPHONE ENCOUNTER
Last OV 8/21/24 for MELANI  OV on 6/3/24 for chronics  Requesting refill on lexapro via [facility] script  Next OV 12/5/24

## 2024-12-05 ENCOUNTER — OFFICE VISIT (OUTPATIENT)
Dept: FAMILY MEDICINE CLINIC | Age: 79
End: 2024-12-05

## 2024-12-05 VITALS
OXYGEN SATURATION: 95 % | BODY MASS INDEX: 29.18 KG/M2 | SYSTOLIC BLOOD PRESSURE: 130 MMHG | DIASTOLIC BLOOD PRESSURE: 64 MMHG | HEART RATE: 64 BPM | WEIGHT: 197 LBS | HEIGHT: 69 IN

## 2024-12-05 DIAGNOSIS — E78.00 PURE HYPERCHOLESTEROLEMIA: ICD-10-CM

## 2024-12-05 DIAGNOSIS — L08.9 TOE INFECTION: ICD-10-CM

## 2024-12-05 DIAGNOSIS — Z79.4 TYPE 2 DIABETES MELLITUS WITH DIABETIC NEUROPATHY, WITH LONG-TERM CURRENT USE OF INSULIN (HCC): ICD-10-CM

## 2024-12-05 DIAGNOSIS — E11.40 TYPE 2 DIABETES MELLITUS WITH DIABETIC NEUROPATHY, WITH LONG-TERM CURRENT USE OF INSULIN (HCC): ICD-10-CM

## 2024-12-05 DIAGNOSIS — Z00.00 MEDICARE ANNUAL WELLNESS VISIT, SUBSEQUENT: Primary | ICD-10-CM

## 2024-12-05 DIAGNOSIS — I10 ESSENTIAL HYPERTENSION, BENIGN: ICD-10-CM

## 2024-12-05 DIAGNOSIS — G47.33 OSA ON CPAP: ICD-10-CM

## 2024-12-05 RX ORDER — ORAL SEMAGLUTIDE 3 MG/1
3 TABLET ORAL DAILY
COMMUNITY
Start: 2024-11-05

## 2024-12-05 RX ORDER — RANOLAZINE 500 MG/1
500 TABLET, EXTENDED RELEASE ORAL 2 TIMES DAILY
COMMUNITY

## 2024-12-05 RX ORDER — NITROGLYCERIN 0.4 MG/1
0.4 TABLET SUBLINGUAL
COMMUNITY
Start: 2024-10-14

## 2024-12-05 RX ORDER — MUPIROCIN 20 MG/G
OINTMENT TOPICAL
Qty: 60 G | Refills: 1 | Status: SHIPPED | OUTPATIENT
Start: 2024-12-05 | End: 2024-12-12

## 2024-12-05 ASSESSMENT — ENCOUNTER SYMPTOMS
DIARRHEA: 0
ABDOMINAL PAIN: 0
CONSTIPATION: 0
NAUSEA: 0
EYE REDNESS: 0
TROUBLE SWALLOWING: 0
BLOOD IN STOOL: 0
VOMITING: 0
COUGH: 0
EYE DISCHARGE: 0
SHORTNESS OF BREATH: 0

## 2024-12-05 ASSESSMENT — PATIENT HEALTH QUESTIONNAIRE - PHQ9
2. FEELING DOWN, DEPRESSED OR HOPELESS: NOT AT ALL
SUM OF ALL RESPONSES TO PHQ QUESTIONS 1-9: 0
1. LITTLE INTEREST OR PLEASURE IN DOING THINGS: NOT AT ALL
SUM OF ALL RESPONSES TO PHQ QUESTIONS 1-9: 0
SUM OF ALL RESPONSES TO PHQ9 QUESTIONS 1 & 2: 0

## 2024-12-05 ASSESSMENT — LIFESTYLE VARIABLES
HOW OFTEN DO YOU HAVE A DRINK CONTAINING ALCOHOL: NEVER
HOW MANY STANDARD DRINKS CONTAINING ALCOHOL DO YOU HAVE ON A TYPICAL DAY: PATIENT DOES NOT DRINK

## 2024-12-05 NOTE — PATIENT INSTRUCTIONS
helps lower your blood pressure. Taste food before salting. Add only a little salt when you think you need it. With time, your taste buds will adjust to less salt.     Eat fewer snack items, fast foods, canned soups, and other high-salt, high-fat, processed foods.     Read food labels and try to avoid saturated and trans fats. They increase your risk of heart disease by raising cholesterol levels.     Limit the amount of solid fat--butter, margarine, and shortening--you eat. Use olive, peanut, or canola oil when you cook. Bake, broil, and steam foods instead of frying them.     Eat a variety of fruit and vegetables every day. Dark green, deep orange, red, or yellow fruits and vegetables are especially good for you. Examples include spinach, carrots, peaches, and berries.     Foods high in fiber can reduce your cholesterol and provide important vitamins and minerals. High-fiber foods include whole-grain cereals and breads, oatmeal, beans, brown rice, citrus fruits, and apples.     Eat lean proteins. Heart-healthy proteins include seafood, lean meats and poultry, eggs, beans, peas, nuts, seeds, and soy products.     Limit drinks and foods with added sugar. These include candy, desserts, and soda pop.   Heart-healthy lifestyle    If your doctor recommends it, get more exercise. For many people, walking is a good choice. Or you may want to swim, bike, or do other activities. Bit by bit, increase the time you're active every day. Try for at least 30 minutes on most days of the week.     Try to quit or cut back on using tobacco and other nicotine products. This includes smoking and vaping. If you need help quitting, talk to your doctor about stop-smoking programs and medicines. These can increase your chances of quitting for good. Quitting is one of the most important things you can do to protect your heart. It is never too late to quit. Try to avoid secondhand smoke too.     Stay at a weight that's healthy for you. Talk

## 2024-12-05 NOTE — PROGRESS NOTES
Medicare Annual Wellness Visit    Ge Swan is here for Medicare AWV, Diabetes Mellitus (A1C was 8.0 on 11/5/24), Hypertension, and Hyperlipidemia    Assessment & Plan   Medicare annual wellness visit, subsequent  Type 2 diabetes mellitus with diabetic neuropathy, with long-term current use of insulin (HCC)  Essential hypertension, benign  Pure hypercholesterolemia  MELANI on CPAP  Toe infection    Recommendations for Preventive Services Due: see orders and patient instructions/AVS.  Recommended screening schedule for the next 5-10 years is provided to the patient in written form: see Patient Instructions/AVS.     Return in about 3 months (around 3/5/2025) for DM, HTN, Hyperlipidemia.     Subjective   The following acute and/or chronic problems were also addressed today:  1.) Medicare wellness   2.) chronic -- see 6mos note  Patient's complete Health Risk Assessment and screening values have been reviewed and are found in Flowsheets. The following problems were reviewed today and where indicated follow up appointments were made and/or referrals ordered.    No Positive Risk Factors identified today.                                Objective   Vitals:    12/05/24 1029   BP: 130/64   Pulse: 64   SpO2: 95%   Weight: 89.4 kg (197 lb)   Height: 1.753 m (5' 9\")      Body mass index is 29.09 kg/m².        PE - see 6mos note             Allergies   Allergen Reactions    Bee Venom Hives    Januvia [Sitagliptin Phosphate]      Numbness     Prior to Visit Medications    Medication Sig Taking? Authorizing Provider   ranolazine (RANEXA) 500 MG extended release tablet Take 1 tablet by mouth 2 times daily Yes ProviderSarah MD   RYBELSUS 3 MG TABS Take 3 mg by mouth daily Yes Provider, MD Sarah   nitroGLYCERIN (NITROSTAT) 0.4 MG SL tablet Place 1 tablet under the tongue Yes Provider, MD Sarah   mupirocin (BACTROBAN) 2 % ointment Apply topically 3 times daily. Yes Sayra Guadarrama MD   escitalopram (LEXAPRO) 20 MG 
behaviors: nutrition and exercise  Reviewed prior labs and health maintenance  Continue current medications, diet and exercise.  Discussed use, benefit, and side effects of prescribed medications.  Barriers to medication compliance addressed.  Patient given educational materials - see patient instructions  Was a self-tracking handout given in paper form or via [employer]? Yes    Requested Prescriptions     Signed Prescriptions Disp Refills    mupirocin (BACTROBAN) 2 % ointment 60 g 1     Sig: Apply topically 3 times daily.       All patient questions answered.  Patient voiced understanding.     Quality Measures    Body mass index is 29.09 kg/m². Elevated. Weight control planned discussed Healthy diet and regular exercise.    BP: 130/64. Blood pressure is normal. Treatment plan consists of No treatment change needed.        12/5/2024    10:40 AM 11/30/2023     3:48 PM 11/29/2022     8:54 AM 11/23/2021     1:46 PM 11/4/2021    10:20 AM 11/2/2020     9:46 AM 8/22/2019     8:40 AM   Fall Risk   Do you feel unsteady or are you worried about falling?  no no no       2 or more falls in past year? no no no no no yes no   Fall with injury in past year? no no no no no no no     The patient does not have a history of falls. I did not - not indicated , complete a risk assessment for falls. A plan of care for falls No Treatment plan indicated    Lab Results   Component Value Date    LDLDIRECT 104 (H) 07/10/2021    (goal LDL reduction with dx if diabetes is 50% LDL reduction)        12/5/2024    10:40 AM 1/3/2024     1:10 PM 11/30/2023     3:48 PM 5/30/2023     9:12 AM 11/29/2022     8:54 AM 11/23/2021     1:46 PM 5/4/2021     7:59 AM   PHQ Scores   PHQ2 Score 0 0 2 3 0 1 1   PHQ9 Score 0 0 2 6 0 1 1     Interpretation of Total Score Depression Severity: 1-4 = Minimal depression, 5-9 = Mild depression, 10-14 = Moderate depression, 15-19 = Moderately severe depression, 20-27 = Severe depression      Return in about 3 months (around

## 2024-12-09 RX ORDER — ESCITALOPRAM OXALATE 20 MG/1
20 TABLET ORAL DAILY
Qty: 30 TABLET | Refills: 5 | Status: SHIPPED | OUTPATIENT
Start: 2024-12-09

## 2025-03-04 ENCOUNTER — OFFICE VISIT (OUTPATIENT)
Dept: FAMILY MEDICINE CLINIC | Age: 80
End: 2025-03-04

## 2025-03-04 VITALS
OXYGEN SATURATION: 97 % | DIASTOLIC BLOOD PRESSURE: 62 MMHG | WEIGHT: 201 LBS | SYSTOLIC BLOOD PRESSURE: 124 MMHG | BODY MASS INDEX: 29.68 KG/M2 | HEART RATE: 80 BPM

## 2025-03-04 DIAGNOSIS — G47.33 OSA ON CPAP: ICD-10-CM

## 2025-03-04 DIAGNOSIS — I10 ESSENTIAL HYPERTENSION, BENIGN: ICD-10-CM

## 2025-03-04 DIAGNOSIS — E78.00 PURE HYPERCHOLESTEROLEMIA: ICD-10-CM

## 2025-03-04 DIAGNOSIS — E11.40 TYPE 2 DIABETES MELLITUS WITH DIABETIC NEUROPATHY, WITH LONG-TERM CURRENT USE OF INSULIN (HCC): Primary | ICD-10-CM

## 2025-03-04 DIAGNOSIS — C61 MALIGNANT NEOPLASM OF PROSTATE (HCC): ICD-10-CM

## 2025-03-04 DIAGNOSIS — Z79.4 TYPE 2 DIABETES MELLITUS WITH DIABETIC NEUROPATHY, WITH LONG-TERM CURRENT USE OF INSULIN (HCC): Primary | ICD-10-CM

## 2025-03-04 RX ORDER — ORAL SEMAGLUTIDE 7 MG/1
7 TABLET ORAL DAILY
COMMUNITY
Start: 2025-03-02

## 2025-03-04 SDOH — ECONOMIC STABILITY: FOOD INSECURITY: WITHIN THE PAST 12 MONTHS, THE FOOD YOU BOUGHT JUST DIDN'T LAST AND YOU DIDN'T HAVE MONEY TO GET MORE.: NEVER TRUE

## 2025-03-04 SDOH — ECONOMIC STABILITY: FOOD INSECURITY: WITHIN THE PAST 12 MONTHS, YOU WORRIED THAT YOUR FOOD WOULD RUN OUT BEFORE YOU GOT MONEY TO BUY MORE.: NEVER TRUE

## 2025-03-04 ASSESSMENT — ENCOUNTER SYMPTOMS
TROUBLE SWALLOWING: 0
EYE DISCHARGE: 0
DIARRHEA: 0
COUGH: 0
SHORTNESS OF BREATH: 0
EYE REDNESS: 0
VOMITING: 0
BLOOD IN STOOL: 0
ABDOMINAL PAIN: 0
CONSTIPATION: 0
NAUSEA: 0

## 2025-03-04 ASSESSMENT — PATIENT HEALTH QUESTIONNAIRE - PHQ9
2. FEELING DOWN, DEPRESSED OR HOPELESS: NOT AT ALL
SUM OF ALL RESPONSES TO PHQ QUESTIONS 1-9: 0
SUM OF ALL RESPONSES TO PHQ QUESTIONS 1-9: 0
1. LITTLE INTEREST OR PLEASURE IN DOING THINGS: NOT AT ALL
SUM OF ALL RESPONSES TO PHQ QUESTIONS 1-9: 0
SUM OF ALL RESPONSES TO PHQ QUESTIONS 1-9: 0

## 2025-03-04 NOTE — PATIENT INSTRUCTIONS
SURVEY:    You may be receiving a survey from [facility] [employer] regarding your visit today.    Please complete the survey to enable us to provide the highest quality of care to you and your family.    If you cannot score us a very good (5 Stars) on any question, please call the office to discuss how we could have made your experience a very good one.    Thank you.    Clinical Care Team: MD Marshall Elkins LPN              Triage: Mara Zimmer CMA              Clerical Team: Mara Hassan

## 2025-03-04 NOTE — PROGRESS NOTES
treatment change needed.        12/5/2024    10:40 AM 11/30/2023     3:48 PM 11/29/2022     8:54 AM 11/23/2021     1:46 PM 11/4/2021    10:20 AM 11/2/2020     9:46 AM 8/22/2019     8:40 AM   Fall Risk   Do you feel unsteady or are you worried about falling?  no no no       2 or more falls in past year? no no no no no yes no   Fall with injury in past year? no no no no no no no     The patient does not have a history of falls. I did not - not indicated , complete a risk assessment for falls. A plan of care for falls No Treatment plan indicated    Lab Results   Component Value Date    LDLDIRECT 104 (H) 07/10/2021    (goal LDL reduction with dx if diabetes is 50% LDL reduction)        3/4/2025     9:34 AM 12/5/2024    10:40 AM 1/3/2024     1:10 PM 11/30/2023     3:48 PM 5/30/2023     9:12 AM 11/29/2022     8:54 AM 11/23/2021     1:46 PM   PHQ Scores   PHQ2 Score 0 0 0 2 3 0 1   PHQ9 Score 0 0 0 2 6 0 1     Interpretation of Total Score Depression Severity: 1-4 = Minimal depression, 5-9 = Mild depression, 10-14 = Moderate depression, 15-19 = Moderately severe depression, 20-27 = Severe depression      Return in about 6 months (around 9/4/2025) for DM, HTN, Hyperlipidemia, sleep apnea.      Electronically signed by Sayra Guadarrama MD on 3/4/2025 at 9:59 AM

## 2025-05-01 ENCOUNTER — OFFICE VISIT (OUTPATIENT)
Dept: FAMILY MEDICINE CLINIC | Age: 80
End: 2025-05-01

## 2025-05-01 VITALS
SYSTOLIC BLOOD PRESSURE: 132 MMHG | HEART RATE: 80 BPM | WEIGHT: 203 LBS | OXYGEN SATURATION: 97 % | BODY MASS INDEX: 29.98 KG/M2 | DIASTOLIC BLOOD PRESSURE: 84 MMHG

## 2025-05-01 DIAGNOSIS — S22.41XA CLOSED FRACTURE OF MULTIPLE RIBS OF RIGHT SIDE, INITIAL ENCOUNTER: Primary | ICD-10-CM

## 2025-05-01 PROBLEM — S22.49XA CLOSED FRACTURE OF MULTIPLE RIBS: Status: ACTIVE | Noted: 2025-04-28

## 2025-05-01 RX ORDER — TRAMADOL HYDROCHLORIDE 50 MG/1
50 TABLET ORAL EVERY 6 HOURS PRN
Qty: 28 TABLET | Refills: 0 | Status: SHIPPED | OUTPATIENT
Start: 2025-05-01 | End: 2025-05-08

## 2025-05-01 RX ORDER — RANOLAZINE 1000 MG/1
1000 TABLET, EXTENDED RELEASE ORAL 2 TIMES DAILY
COMMUNITY
Start: 2025-03-02

## 2025-05-01 RX ORDER — HYDROCODONE BITARTRATE AND ACETAMINOPHEN 5; 325 MG/1; MG/1
1 TABLET ORAL EVERY 6 HOURS PRN
COMMUNITY

## 2025-05-01 ASSESSMENT — ENCOUNTER SYMPTOMS
VOMITING: 0
WHEEZING: 0
DIARRHEA: 0
EYE REDNESS: 0
COUGH: 0
SHORTNESS OF BREATH: 0

## 2025-05-01 NOTE — PATIENT INSTRUCTIONS
SURVEY:    You may be receiving a survey from [employer] regarding your visit today.    Please complete the survey to enable us to provide the highest quality of care to you and your family.      Thank you.    Clinical Care Team: MD Marshall Elkins LPN              Triage: Mara Zimmer CMA              Clerical Team: Mara Hassan

## 2025-05-01 NOTE — PROGRESS NOTES
HPI Notes    Name: Ge Swan  : 1945        Chief Complaint:     Chief Complaint   Patient presents with    Rib Injury     Patient here today for ED follow up. Went to [facility] at [place] on 25 after falling off  and hitting the deck of the . He finished cutting the grass and drove himself to the emergency department. Fracture 9th and 11 th right ribs       History of Present Illness:     Ge Swan is a 80 y.o.  male who presents with Rib Injury (Patient here today for ED follow up. Went to [facility] at [place] on 25 after falling off  and hitting the deck of the . He finished cutting the grass and drove himself to the emergency department. Fracture 9th and 11 th right ribs)      HPI  Rib injury - pt was mowing his lawn on 25  pt was mowing his lawn and fell off the lawn more and hit his deck. Pt got up and finished cutting his grass and then fixed his sump pump. Pt was having so much pain on Rt side and so went to the ER in [place]. Pt did not hit his head and NO LOC. Pt had turned off mower to get off and  a dog toy so mower was off and still. Pt just fell trying to get off mower. So, Pt still in pain but a little better as mainly hurts when he coughs.  Pt is using his incentive spirometry to take deep breaths 2-4 times. Pt given vicodin but only 6 pills and only has taken about 4 pills to help sleep at night.  No other pain. Fractured 9 and 11.  Pt's daughters are coming out to help him.    Past Medical History:     Past Medical History:   Diagnosis Date    Allergic rhinitis     Allergist - Dr Aguilera    Arthritis     CAD (coronary artery disease)     Dr Marie    Cancer ([facility]) 2006    Prostate Cancer (Removed prostate)-- Dr Hernandes    Diabetic neuropathy ([facility])     podiatry - Dr Barillas    Hyperlipidemia     Hypertension     Type II or unspecified type diabetes mellitus without mention of complication, not stated as uncontrolled

## 2025-05-19 RX ORDER — ESCITALOPRAM OXALATE 20 MG/1
20 TABLET ORAL DAILY
Qty: 30 TABLET | Refills: 5 | Status: SHIPPED | OUTPATIENT
Start: 2025-05-19

## 2025-05-19 NOTE — TELEPHONE ENCOUNTER
Last OV: 5/1/2025    Next scheduled apt: 9/8/2025      Surescripts requesting refill  Medication pending      
None